# Patient Record
Sex: FEMALE | Race: WHITE | Employment: OTHER
[De-identification: names, ages, dates, MRNs, and addresses within clinical notes are randomized per-mention and may not be internally consistent; named-entity substitution may affect disease eponyms.]

---

## 2017-04-05 DIAGNOSIS — F41.9 ANXIETY: ICD-10-CM

## 2017-04-05 RX ORDER — BUSPIRONE HYDROCHLORIDE 15 MG/1
TABLET ORAL
Qty: 60 TABLET | Refills: 2 | Status: SHIPPED | OUTPATIENT
Start: 2017-04-05 | End: 2017-06-20

## 2017-04-05 NOTE — TELEPHONE ENCOUNTER
busPIRone (BUSPAR) 15 MG tablet       Last Written Prescription Date: 6/07/2016  Last Fill Quantity: 30; # refills: 11  Last Office Visit with [facility], [facility] or [facility] prescribing provider:  6/07/2016        Last PHQ-9 score on record=   PHQ-9 SCORE 9/8/2015   Total Score -   Total Score 4       Lab Results   Component Value Date    AST 10 02/10/2015     Lab Results   Component Value Date    ALT 23 02/10/2015

## 2017-06-10 ENCOUNTER — HEALTH MAINTENANCE LETTER (OUTPATIENT)
Age: 82
End: 2017-06-10

## 2017-06-16 DIAGNOSIS — I10 ESSENTIAL HYPERTENSION, BENIGN: Primary | ICD-10-CM

## 2017-06-16 DIAGNOSIS — F41.9 ANXIETY: ICD-10-CM

## 2017-06-16 DIAGNOSIS — G47.00 INSOMNIA, UNSPECIFIED: ICD-10-CM

## 2017-06-16 DIAGNOSIS — M85.80 OSTEOPENIA: ICD-10-CM

## 2017-06-16 NOTE — TELEPHONE ENCOUNTER
LORazepam (ATIVAN) 0.5 MG tablet      Last Written Prescription Date:  12/13/16  Last Fill Quantity: 30 tab,   # refills: 5  Last Office Visit with [facility], [facility] or [facility] prescribing provider: 06/07/16  Future Office visit:       Routing refill request to provider for review/approval because:  Drug not on the [facility], [facility] or [facility] refill protocol or controlled substance

## 2017-06-18 DIAGNOSIS — F41.9 ANXIETY: ICD-10-CM

## 2017-06-20 RX ORDER — ZOLPIDEM TARTRATE 6.25 MG/1
6.25 TABLET, FILM COATED, EXTENDED RELEASE ORAL
Qty: 30 TABLET | Refills: 0 | Status: SHIPPED | OUTPATIENT
Start: 2017-06-20 | End: 2017-07-14

## 2017-06-20 RX ORDER — IRBESARTAN AND HYDROCHLOROTHIAZIDE 150; 12.5 MG/1; MG/1
TABLET, FILM COATED ORAL
Qty: 30 TABLET | Refills: 0 | Status: SHIPPED | OUTPATIENT
Start: 2017-06-20 | End: 2017-07-14

## 2017-06-20 RX ORDER — BUSPIRONE HYDROCHLORIDE 15 MG/1
TABLET ORAL
Qty: 60 TABLET | Refills: 0 | Status: SHIPPED | OUTPATIENT
Start: 2017-06-20 | End: 2017-07-14

## 2017-06-20 RX ORDER — ALENDRONATE SODIUM 70 MG/1
70 TABLET ORAL
Qty: 4 TABLET | Refills: 0 | Status: SHIPPED | OUTPATIENT
Start: 2017-06-20 | End: 2017-07-14

## 2017-06-20 RX ORDER — LORAZEPAM 0.5 MG/1
TABLET ORAL
Qty: 30 TABLET | Refills: 0 | Status: SHIPPED | OUTPATIENT
Start: 2017-06-20 | End: 2017-07-14

## 2017-06-20 NOTE — TELEPHONE ENCOUNTER
Rx called into [facility].   The patient has been notified of this information and all questions answered.  Patient will call to schedule lab appointment unable to schedule due to hold on appointment slots because of construction.

## 2017-06-20 NOTE — TELEPHONE ENCOUNTER
Pt will be out tomorrow      irbesartan-hydrochlorothiazide (AVALIDE) 150-12.5 MG per tablet      Last Written Prescription Date: 06/07/2016  Last Fill Quantity: 30, # refills: 11  Last Office Visit with [facility], [facility] or [facility] prescribing provider: 06/07/2016       Potassium   Date Value Ref Range Status   06/07/2016 4.2 3.4 - 5.3 mmol/L Final     Creatinine   Date Value Ref Range Status   06/07/2016 0.58 0.52 - 1.04 mg/dL Final     BP Readings from Last 3 Encounters:   06/07/16 130/70   01/08/16 134/74   01/05/16 152/68

## 2017-06-20 NOTE — TELEPHONE ENCOUNTER
Please call in RF for Lorazepam, Ambien and Alendronate for 1 month to local pharmacy. Other meds faxed OK. Pt due for fasting labs and appt in clinic. Pt to have fastibng labs done as ordered in the next 3 weeks and then see me a few days after labs are done to review results and follow-up on medical issues. Will address longterm refills of meds at that time

## 2017-06-20 NOTE — TELEPHONE ENCOUNTER
Routing refill request to provider for review/approval because:  Patient needs to be seen because it has been more than 1 year since last office visit.  Due for PHQ-9 form

## 2017-06-20 NOTE — TELEPHONE ENCOUNTER
LOV was June 7, 2016.   Pt is due for annual exam.   RX for irbesartan-hydrochlorothiazide (AVALIDE) 150-12.5 MG per tablet. Medication is being filled for 1 time refill only due to:  Patient needs to be seen because it has been more than one year since last visit.     RX for LORazepam (ATIVAN) 0.5 MG tablet--Routing refill request to provider for review/approval because:  RX is a controlled med.

## 2017-06-20 NOTE — TELEPHONE ENCOUNTER
FLUoxetine (PROZAC) 20 MG capsule     Last Written Prescription Date: 6/07/2016  Last Fill Quantity: 30, # refills: 11  Last Office Visit with [facility] primary care provider:  6/07/2016        Last PHQ-9 score on record=   PHQ-9 SCORE 9/8/2015   Total Score -   Total Score 4

## 2017-06-22 DIAGNOSIS — I10 ESSENTIAL HYPERTENSION, BENIGN: ICD-10-CM

## 2017-06-22 LAB
ALBUMIN SERPL-MCNC: 3.8 G/DL (ref 3.4–5)
ALP SERPL-CCNC: 65 U/L (ref 40–150)
ALT SERPL W P-5'-P-CCNC: 20 U/L (ref 0–50)
ANION GAP SERPL CALCULATED.3IONS-SCNC: 7 MMOL/L (ref 3–14)
AST SERPL W P-5'-P-CCNC: 13 U/L (ref 0–45)
BILIRUB SERPL-MCNC: 0.6 MG/DL (ref 0.2–1.3)
BUN SERPL-MCNC: 15 MG/DL (ref 7–30)
CALCIUM SERPL-MCNC: 8.9 MG/DL (ref 8.5–10.1)
CHLORIDE SERPL-SCNC: 101 MMOL/L (ref 94–109)
CHOLEST SERPL-MCNC: 195 MG/DL
CO2 SERPL-SCNC: 29 MMOL/L (ref 20–32)
CREAT SERPL-MCNC: 0.64 MG/DL (ref 0.52–1.04)
GFR SERPL CREATININE-BSD FRML MDRD: 90 ML/MIN/1.7M2
GLUCOSE SERPL-MCNC: 94 MG/DL (ref 70–99)
HDLC SERPL-MCNC: 79 MG/DL
LDLC SERPL CALC-MCNC: 103 MG/DL
NONHDLC SERPL-MCNC: 116 MG/DL
POTASSIUM SERPL-SCNC: 4.2 MMOL/L (ref 3.4–5.3)
PROT SERPL-MCNC: 7.2 G/DL (ref 6.8–8.8)
SODIUM SERPL-SCNC: 137 MMOL/L (ref 133–144)
TRIGL SERPL-MCNC: 65 MG/DL

## 2017-06-22 PROCEDURE — 36415 COLL VENOUS BLD VENIPUNCTURE: CPT | Performed by: INTERNAL MEDICINE

## 2017-06-22 PROCEDURE — 80053 COMPREHEN METABOLIC PANEL: CPT | Performed by: INTERNAL MEDICINE

## 2017-06-22 PROCEDURE — 80061 LIPID PANEL: CPT | Performed by: INTERNAL MEDICINE

## 2017-07-14 ENCOUNTER — OFFICE VISIT (OUTPATIENT)
Dept: INTERNAL MEDICINE | Facility: CLINIC | Age: 82
End: 2017-07-14
Payer: COMMERCIAL

## 2017-07-14 VITALS
WEIGHT: 136 LBS | DIASTOLIC BLOOD PRESSURE: 72 MMHG | SYSTOLIC BLOOD PRESSURE: 128 MMHG | HEART RATE: 71 BPM | TEMPERATURE: 97.5 F | OXYGEN SATURATION: 98 % | HEIGHT: 57 IN | BODY MASS INDEX: 29.34 KG/M2

## 2017-07-14 DIAGNOSIS — Z00.00 MEDICARE ANNUAL WELLNESS VISIT, SUBSEQUENT: Primary | ICD-10-CM

## 2017-07-14 DIAGNOSIS — M85.80 OSTEOPENIA, UNSPECIFIED LOCATION: ICD-10-CM

## 2017-07-14 DIAGNOSIS — F41.9 ANXIETY: ICD-10-CM

## 2017-07-14 DIAGNOSIS — R53.83 OTHER FATIGUE: ICD-10-CM

## 2017-07-14 DIAGNOSIS — I10 ESSENTIAL HYPERTENSION, BENIGN: ICD-10-CM

## 2017-07-14 DIAGNOSIS — G47.00 INSOMNIA, UNSPECIFIED: ICD-10-CM

## 2017-07-14 DIAGNOSIS — M21.619 BUNION OF GREAT TOE: ICD-10-CM

## 2017-07-14 LAB
CORTIS SERPL-MCNC: 10.2 UG/DL (ref 4–22)
ERYTHROCYTE [DISTWIDTH] IN BLOOD BY AUTOMATED COUNT: 12.8 % (ref 10–15)
HCT VFR BLD AUTO: 41 % (ref 35–47)
HGB BLD-MCNC: 13.8 G/DL (ref 11.7–15.7)
MCH RBC QN AUTO: 30.1 PG (ref 26.5–33)
MCHC RBC AUTO-ENTMCNC: 33.7 G/DL (ref 31.5–36.5)
MCV RBC AUTO: 90 FL (ref 78–100)
PLATELET # BLD AUTO: 297 10E9/L (ref 150–450)
RBC # BLD AUTO: 4.58 10E12/L (ref 3.8–5.2)
TSH SERPL DL<=0.005 MIU/L-ACNC: 2.2 MU/L (ref 0.4–4)
WBC # BLD AUTO: 6 10E9/L (ref 4–11)

## 2017-07-14 PROCEDURE — 99397 PER PM REEVAL EST PAT 65+ YR: CPT | Performed by: INTERNAL MEDICINE

## 2017-07-14 PROCEDURE — 36415 COLL VENOUS BLD VENIPUNCTURE: CPT | Performed by: INTERNAL MEDICINE

## 2017-07-14 PROCEDURE — 85027 COMPLETE CBC AUTOMATED: CPT | Performed by: INTERNAL MEDICINE

## 2017-07-14 PROCEDURE — 84443 ASSAY THYROID STIM HORMONE: CPT | Performed by: INTERNAL MEDICINE

## 2017-07-14 PROCEDURE — 82533 TOTAL CORTISOL: CPT | Performed by: INTERNAL MEDICINE

## 2017-07-14 RX ORDER — IRBESARTAN AND HYDROCHLOROTHIAZIDE 150; 12.5 MG/1; MG/1
1 TABLET, FILM COATED ORAL DAILY
Qty: 90 TABLET | Refills: 3 | Status: SHIPPED | OUTPATIENT
Start: 2017-07-14 | End: 2018-03-08

## 2017-07-14 RX ORDER — LORAZEPAM 0.5 MG/1
TABLET ORAL
Qty: 30 TABLET | Refills: 5 | Status: SHIPPED | OUTPATIENT
Start: 2017-07-14 | End: 2018-01-10

## 2017-07-14 RX ORDER — ZOLPIDEM TARTRATE 6.25 MG/1
6.25 TABLET, FILM COATED, EXTENDED RELEASE ORAL
Qty: 30 TABLET | Refills: 3 | Status: SHIPPED | OUTPATIENT
Start: 2017-07-14 | End: 2018-08-01

## 2017-07-14 RX ORDER — ALENDRONATE SODIUM 70 MG/1
70 TABLET ORAL
Qty: 12 TABLET | Refills: 3 | Status: SHIPPED | OUTPATIENT
Start: 2017-07-14 | End: 2018-03-08

## 2017-07-14 RX ORDER — LORAZEPAM 0.5 MG/1
TABLET ORAL
Qty: 30 TABLET | Refills: 1 | Status: SHIPPED | OUTPATIENT
Start: 2017-07-14 | End: 2017-07-14

## 2017-07-14 RX ORDER — BUSPIRONE HYDROCHLORIDE 15 MG/1
TABLET ORAL
Qty: 90 TABLET | Refills: 3 | Status: SHIPPED | OUTPATIENT
Start: 2017-07-14 | End: 2018-03-08

## 2017-07-14 ASSESSMENT — ANXIETY QUESTIONNAIRES
GAD7 TOTAL SCORE: 6
2. NOT BEING ABLE TO STOP OR CONTROL WORRYING: SEVERAL DAYS
5. BEING SO RESTLESS THAT IT IS HARD TO SIT STILL: NOT AT ALL
3. WORRYING TOO MUCH ABOUT DIFFERENT THINGS: MORE THAN HALF THE DAYS
1. FEELING NERVOUS, ANXIOUS, OR ON EDGE: SEVERAL DAYS
7. FEELING AFRAID AS IF SOMETHING AWFUL MIGHT HAPPEN: NOT AT ALL
IF YOU CHECKED OFF ANY PROBLEMS ON THIS QUESTIONNAIRE, HOW DIFFICULT HAVE THESE PROBLEMS MADE IT FOR YOU TO DO YOUR WORK, TAKE CARE OF THINGS AT HOME, OR GET ALONG WITH OTHER PEOPLE: SOMEWHAT DIFFICULT
6. BECOMING EASILY ANNOYED OR IRRITABLE: SEVERAL DAYS

## 2017-07-14 ASSESSMENT — PATIENT HEALTH QUESTIONNAIRE - PHQ9: 5. POOR APPETITE OR OVEREATING: SEVERAL DAYS

## 2017-07-14 NOTE — NURSING NOTE
"Chief Complaint   Patient presents with     Physical       Initial /72  Pulse 71  Temp 97.5  F (36.4  C) (Oral)  Ht 4' 9\" (1.448 m)  Wt 136 lb (61.7 kg)  SpO2 98%  BMI 29.43 kg/m2 Estimated body mass index is 29.43 kg/(m^2) as calculated from the following:    Height as of this encounter: 4' 9\" (1.448 m).    Weight as of this encounter: 136 lb (61.7 kg).  Medication Reconciliation: complete  "

## 2017-07-14 NOTE — PROGRESS NOTES
SUBJECTIVE:   Kathy Braswell is a 82 year old female who presents for Preventive Visit.      Are you in the first 12 months of your Medicare Part B coverage?  No    Healthy Habits:    Do you get at least three servings of calcium containing foods daily (dairy, green leafy vegetables, etc.)? yes    Amount of exercise or daily activities, outside of work: 2-3 day(s) per week with CURVES    Problems taking medications regularly No    Medication side effects: No    Have you had an eye exam in the past two years? yes    Do you see a dentist twice per year? yes    Do you have sleep apnea, excessive snoring or daytime drowsiness?no    COGNITIVE SCREEN  1) Repeat 3 items (Banana, Sunrise, Chair)    2) Clock draw: NORMAL  -   3) 3 item recall: Recalls 2 objects   Results: NORMAL clock, 1-2 items recalled: COGNITIVE IMPAIRMENT LESS LIKELY    Mini-CogTM Copyright S Dena. Licensed by the author for use in [facility]; reprinted with permission (tamanna@[email or web address]). All rights reserved.            Reviewed and updated as needed this visit by clinical staff  Tobacco  Allergies         Reviewed and updated as needed this visit by Provider        Social History   Substance Use Topics     Smoking status: Never Smoker     Smokeless tobacco: Never Used     Alcohol use 0.0 oz/week     0 Standard drinks or equivalent per week      Comment: seldom       The patient does not drink >3 drinks per day nor >7 drinks per week.    Today's PHQ-2 Score:   PHQ-2 ( 1999 [employer]) 7/14/2017 6/7/2016   Q1: Little interest or pleasure in doing things 0 0   Q2: Feeling down, depressed or hopeless 1 0   PHQ-2 Score 1 0       Do you feel safe in your environment - Yes    Do you have a Health Care Directive?: No: Advance care planning was reviewed with patient; patient given information to fill out and return to clinic.    Current providers sharing in care for this patient include:   Patient Care Team:  Rio Ospina MD as PCP -  General      Hearing impairment: No    Ability to successfully perform activities of daily living: Yes, no assistance needed     Fall risk:  Fallen 2 or more times in the past year?: No  Any fall with injury in the past year?: No    Home safety:  none identified      The following health maintenance items are reviewed in Epic and correct as of today:  Health Maintenance   Topic Date Due     PHQ-9 Q6 MONTHS  03/08/2016     ADVANCE DIRECTIVE PLANNING Q5 YRS  06/13/2016     COLONOSCOPY Q5 YR  07/28/2016     FALL RISK ASSESSMENT  06/07/2017     DEPRESSION ACTION PLAN Q1 YR  06/07/2017     INFLUENZA VACCINE (SYSTEM ASSIGNED)  09/01/2017     TETANUS IMMUNIZATION (SYSTEM ASSIGNED)  10/05/2026     DEXA SCAN SCREENING (SYSTEM ASSIGNED)  Completed     PNEUMOCOCCAL  Completed     Labs reviewed in EPIC        ROS:  C: NEGATIVE for fever, chills, change in weight compared to Jan 2016  I: NEGATIVE for worrisome rashes, moles or lesions  E: NEGATIVE for vision changes or irritation. Eye exam July 2016  E/M: NEGATIVE for ear, mouth and throat problems. Occ clear rhinorrhea. Better with Claritin prn  R: NEGATIVE for significant cough or SOB  B: NEGATIVE for masses, tenderness or discharge  CV: NEGATIVE for chest pain, palpitations or peripheral edema  GI: NEGATIVE for nausea, abdominal pain  or change in bowel habits. Very rare GERD. Not bothersome enough to use meds. Drinks a lot of coffee  : NEGATIVE for frequency, dysuria, or hematuria. Has occ nocturia with caffeine use. Wearing a pad at night  M: NEGATIVE for significant arthralgias or myalgia that stop activity. Mild stiffness with mild osteoarthritis in AM. Still being able to do CURVES 2-3x/week  N: NEGATIVE for weakness, dizziness or paresthesias  E: NEGATIVE for temperature intolerance, skin/hair changes. Hx osteoporosis. Had failed drug holiday and restarted Fosamax in 2015  H: NEGATIVE for bleeding problems  P: POSITIVE for depression and anxiety. See PHQ and JUANPABLO in  "chart. Using Lorazepam daily in addition to Buspar.  Had fatigue issue with Buspar BID so taking once a day now along with Fluoxetine 20mg daily. Didn't tolerate higher doses of Fluoxetine.  Using Ambien about 2-3x/week    OBJECTIVE:   /72  Pulse 71  Temp 97.5  F (36.4  C) (Oral)  Ht 4' 9\" (1.448 m)  Wt 136 lb (61.7 kg)  SpO2 98%  BMI 29.43 kg/m2 Estimated body mass index is 29.43 kg/(m^2) as calculated from the following:    Height as of this encounter: 4' 9\" (1.448 m).    Weight as of this encounter: 136 lb (61.7 kg).  EXAM:   General appearance -   alert, no distress. Slight chronic anxious affect  Skin - No rashes or lesions.  Head - normocephalic, atraumatic  Eyes - VALENTE, EOMI, fundi exam with nondilated pupils negative.  Ears - External ears normal. Canals clear. TM's normal.  Nose/Sinuses - Nares normal. Septum midline. Mucosa normal. No drainage or sinus tenderness.  Oropharynx - No erythema, no adenopathy, no exudates.  Neck - Supple without adenopathy or thyromegaly. No bruits.  Lungs - Clear to auscultation without wheezes/rhonchi.  Heart - Regular rate and rhythm without murmurs, clicks, or gallops.  Nodes - No supraclavicular, axillary, or inguinal adenopathy palpable.  Breasts - deferred  Abdomen - Abdomen soft, non-tender. BS normal. No masses or hepatosplenomegaly palpable. No bruits.  Extremities -No cyanosis, clubbing or edema.   Mild nontender varicose veins BLEs  Musculoskeletal - Spine ROM normal. Muscular strength intact.  Bilateral 1st MTP bunions.  Arches of feet low due to this. No skin callus. Stable gait when wearing shoes.  Minimally imbalanced due to foot formation when barefoot  Peripheral pulses - radial=4/4, femoral=4/4, posterior tibial=4/4, dorsalis pedis=4/4,  Neuro - Gait normal. Reflexes normal and symmetric. Sensation grossly WNL.  Genital/Rectal - deferred      ASSESSMENT / PLAN:   1. Medicare annual wellness visit, subsequent  HCM UTD. Due for mammogram in " "August    2. Anxiety   Controlled overall. JUANPABLO score still a little elevated but has not tolerated higher doses of Buspar or Fluoxetine and best not to be increasing Lorazepam use given age and  Mild fatigue sx already  - busPIRone (BUSPAR) 15 MG tablet; 1 tab daily  Dispense: 90 tablet; Refill: 3  - FLUoxetine (PROZAC) 20 MG capsule; Take 1 capsule (20 mg) by mouth daily  Dispense: 90 capsule; Refill: 3  - LORazepam (ATIVAN) 0.5 MG tablet; 1 tab daily as needed for anxiety  Dispense: 30 tablet; Refill: 5    3. Essential hypertension, benign  controlled  - irbesartan-hydrochlorothiazide (AVALIDE) 150-12.5 MG per tablet; Take 1 tablet by mouth daily  Dispense: 90 tablet; Refill: 3    4. Osteopenia, unspecified location  Needs longterm treatment. HAs failed previous drug holiday attempt  - alendronate (FOSAMAX) 70 MG tablet; Take 1 tablet (70 mg) by mouth every 7 days Take with over 8 ounces water and stay upright for at least 30 minutes after dose.  Take at least 60 minutes before breakfast  Dispense: 12 tablet; Refill: 3    5. Insomnia, unspecified  stable  - zolpidem (AMBIEN CR) 6.25 MG CR tablet; Take 1 tablet (6.25 mg) by mouth nightly as needed for sleep  Dispense: 30 tablet; Refill: 3    6. Other fatigue  - CBC with platelets  - TSH with free T4 reflex  - Cortisol    7. Bunion of great toe  Sx bilateral. Pt declines surgery. Maintain god arch support and wide shoes to assist with stable balance. No acute skin issues      End of Life Planning:  Patient currently has an advanced directive: No.  I have verified the patient's ablity to prepare an advanced directive/make health care decisions.  Literature was provided to assist patient in preparing an advanced directive.    COUNSELING:  Reviewed preventive health counseling, as reflected in patient instructions        Estimated body mass index is 29.43 kg/(m^2) as calculated from the following:    Height as of this encounter: 4' 9\" (1.448 m).    Weight as of this " encounter: 136 lb (61.7 kg).     reports that she has never smoked. She has never used smokeless tobacco.      Appropriate preventive services were discussed with this patient, including applicable screening as appropriate for cardiovascular disease, diabetes, osteopenia/osteoporosis, and glaucoma.  As appropriate for age/gender, discussed screening for colorectal cancer, prostate cancer, breast cancer, and cervical cancer. Checklist reviewing preventive services available has been given to the patient.    Reviewed patients plan of care and provided an AVS. The Basic Care Plan (routine screening as documented in Health Maintenance) for Kathy meets the Care Plan requirement. This Care Plan has been established and reviewed with the Patient.    Counseling Resources:  ATP IV Guidelines  Pooled Cohorts Equation Calculator  Breast Cancer Risk Calculator  FRAX Risk Assessment  ICSI Preventive Guidelines  Dietary Guidelines for Americans, 2010  USDA's MyPlate  ASA Prophylaxis  Lung CA Screening    PLAN:   Mammogram after 8/18/17  Continue current medications  Try to use shoes with better arch support. ? Birkenstocks, tennis shoes. Consider Emile shoes  Labs for fatigue    Rio Ospina MD  [facility]

## 2017-07-14 NOTE — LETTER
[facility]  [address] [address]  [phone]      7/15/2017       Kathy Braswell  [address]   [facility] [address]        Dear Kathy,  Enclosed are a printed copy of your most recent  lab results.   Unless commented on below, mild variation of results  outside the normal range are not clinically signicant.    Hemoglobin, Platelets,  Cortisol (adrenaline), Thyroid and White Blood Cells lab results were normal.  No evidence of a metabolic cause for your fatigue symptoms  Continue current medication.  If the fatigue issues worsen, then contact the clinic nurse triage line [phone] to let me know and I will refer you onto the [facility] to assess your sleep quality further to see if this is contributing to your symptoms    If you have further questions/concerns regarding the results, I would ask that you bring them to your next follow-up appointment with me and I would be happy to review them with you further.        Sincerely,      Rio Ospina MD  Internal Medicine

## 2017-07-14 NOTE — MR AVS SNAPSHOT
"              After Visit Summary   7/14/2017    Kathy Braswell    MRN: [id number]           Patient Information     Date Of Birth          1935        Visit Information        Provider Department      7/14/2017 9:00 AM Rio Ospina MD [facility]        Today's Diagnoses     Medicare annual wellness visit, subsequent    -  1    Anxiety        Essential hypertension, benign        Osteopenia, unspecified location        Insomnia, unspecified        Other fatigue          Care Instructions     Mammogram after 8/18/17  Continue current medications  Try to use shoes with better arch support. ? Birkenstocks, tennis shoes. Consider [facility] shoes  Labs for fatigue          Follow-ups after your visit        Who to contact     If you have questions or need follow up information about today's clinic visit or your schedule please contact [facility] directly at [phone].  Normal or non-critical lab and imaging results will be communicated to you by [employer], letter or phone within 4 business days after the clinic has received the results. If you do not hear from us within 7 days, please contact the clinic through MyChart or phone. If you have a critical or abnormal lab result, we will notify you by phone as soon as possible.  Submit refill requests through [employer] or call your pharmacy and they will forward the refill request to us. Please allow 3 business days for your refill to be completed.          Additional Information About Your Visit        MyChart Information     [employer] lets you send messages to your doctor, view your test results, renew your prescriptions, schedule appointments and more. To sign up, go to [email or web address] . Click on \"Log in\" on the left side of the screen, which will take you to the Welcome page. Then click on \"Sign up Now\" on the right side of the page.     You will be asked to enter the access code listed below, as well as some " "personal information. Please follow the directions to create your username and password.     Your access code is: [email or web address]  Expires: 10/12/2017  9:42 AM     Your access code will  in 90 days. If you need help or a new code, please call your [facility] clinic or [phone].        Care EveryWhere ID     This is your Care EveryWhere ID. This could be used by other organizations to access your [facility] medical records  [id number]        Your Vitals Were     Pulse Temperature Height Pulse Oximetry BMI (Body Mass Index)       71 97.5  F (36.4  C) (Oral) 4' 9\" (1.448 m) 98% 29.43 kg/m2        Blood Pressure from Last 3 Encounters:   17 128/72   16 130/70   16 134/74    Weight from Last 3 Encounters:   17 136 lb (61.7 kg)   16 129 lb (58.5 kg)   16 135 lb (61.2 kg)              We Performed the Following     CBC with platelets     Cortisol     TSH with free T4 reflex          Today's Medication Changes          These changes are accurate as of: 17  9:42 AM.  If you have any questions, ask your nurse or doctor.               Start taking these medicines.        Dose/Directions    LORazepam 0.5 MG tablet   Commonly known as:  ATIVAN   Used for:  Anxiety   Started by:  Rio Ospina MD        1 tab daily as needed for anxiety   Quantity:  30 tablet   Refills:  5         These medicines have changed or have updated prescriptions.        Dose/Directions    busPIRone 15 MG tablet   Commonly known as:  BUSPAR   This may have changed:  additional instructions   Used for:  Anxiety   Changed by:  Rio Ospina MD        1 tab daily   Quantity:  90 tablet   Refills:  3       irbesartan-hydrochlorothiazide 150-12.5 MG per tablet   Commonly known as:  AVALIDE   This may have changed:  See the new instructions.   Used for:  Essential hypertension, benign   Changed by:  Rio Ospina MD        Dose:  1 tablet   Take 1 tablet by mouth daily   Quantity:  90 tablet   Refills:  3          "   Where to get your medicines      These medications were sent to [facility] [id number] [facility] [address] [place] [address] & [address]  [address] [address]     Phone:  [phone]     busPIRone 15 MG tablet    FLUoxetine 20 MG capsule    irbesartan-hydrochlorothiazide 150-12.5 MG per tablet         Some of these will need a paper prescription and others can be bought over the counter.  Ask your nurse if you have questions.     Bring a paper prescription for each of these medications     alendronate 70 MG tablet    LORazepam 0.5 MG tablet    zolpidem 6.25 MG CR tablet                Primary Care Provider Office Phone # Fax #    Rio Ospina [phone]       [facility] [address]  [facility] [address]        Equal Access to Services     AMITA GARCIA : Hadii karena mckay hadasho Soomaali, waaxda luqadaha, qaybta kaalmada ademarivelyada, jennifer duque . So [facility] [phone].    ATENCIÓN: Si habla español, tiene a viveros disposición servicios gratuitos de asistencia lingüística. Llame al [phone].    We comply with applicable federal civil rights laws and [place] laws. We do not discriminate on the basis of race, color, national origin, age, disability sex, sexual orientation or gender identity.            Thank you!     Thank you for choosing [facility]  for your care. Our goal is always to provide you with excellent care. Hearing back from our patients is one way we can continue to improve our services. Please take a few minutes to complete the written survey that you may receive in the mail after your visit with us. Thank you!             Your Updated Medication List - Protect others around you: Learn how to safely use, store and throw away your medicines at www.disposemymeds.org.          This list is accurate as of: 7/14/17  9:42 AM.  Always use your most recent med list.                   Brand Name  Dispense Instructions for use Diagnosis    alendronate 70 MG tablet    FOSAMAX    12 tablet    Take 1 tablet (70 mg) by mouth every 7 days Take with over 8 ounces water and stay upright for at least 30 minutes after dose.  Take at least 60 minutes before breakfast    Osteopenia, unspecified location       busPIRone 15 MG tablet    BUSPAR    90 tablet    1 tab daily    Anxiety       cholecalciferol 1000 UNIT tablet    vitamin D     Take 1 tablet by mouth daily.        FLUoxetine 20 MG capsule    PROzac    90 capsule    Take 1 capsule (20 mg) by mouth daily    Anxiety       fluticasone 50 MCG/ACT spray    FLONASE    1 Package    Spray 1-2 sprays into both nostrils daily    Nasal congestion       irbesartan-hydrochlorothiazide 150-12.5 MG per tablet    AVALIDE    90 tablet    Take 1 tablet by mouth daily    Essential hypertension, benign       LORazepam 0.5 MG tablet    ATIVAN    30 tablet    1 tab daily as needed for anxiety    Anxiety       order for DME     1 each    BP cuff, brand as covered by insurance.  Dx: HTN    Essential hypertension, benign       TYLENOL 325 MG tablet   Generic drug:  acetaminophen      Take 1-2 tablets (325-650 mg) by mouth every 6 hours as needed for mild pain        zolpidem 6.25 MG CR tablet    AMBIEN CR    30 tablet    Take 1 tablet (6.25 mg) by mouth nightly as needed for sleep    Insomnia, unspecified

## 2017-07-14 NOTE — PATIENT INSTRUCTIONS
Mammogram after 8/18/17  Continue current medications  Try to use shoes with better arch support. ? Birkenstocks, tennis shoes. Consider [facility] shoes  Labs for fatigue

## 2017-07-15 ASSESSMENT — PATIENT HEALTH QUESTIONNAIRE - PHQ9: SUM OF ALL RESPONSES TO PHQ QUESTIONS 1-9: 3

## 2017-07-15 ASSESSMENT — ANXIETY QUESTIONNAIRES: GAD7 TOTAL SCORE: 6

## 2017-08-22 ENCOUNTER — RADIANT APPOINTMENT (OUTPATIENT)
Dept: MAMMOGRAPHY | Facility: CLINIC | Age: 82
End: 2017-08-22
Attending: INTERNAL MEDICINE
Payer: COMMERCIAL

## 2017-08-22 DIAGNOSIS — Z12.31 VISIT FOR SCREENING MAMMOGRAM: ICD-10-CM

## 2017-08-22 PROCEDURE — G0202 SCR MAMMO BI INCL CAD: HCPCS | Mod: TC

## 2017-10-11 ENCOUNTER — ALLIED HEALTH/NURSE VISIT (OUTPATIENT)
Dept: NURSING | Facility: CLINIC | Age: 82
End: 2017-10-11
Payer: COMMERCIAL

## 2017-10-11 DIAGNOSIS — Z23 NEED FOR PROPHYLACTIC VACCINATION AND INOCULATION AGAINST INFLUENZA: Primary | ICD-10-CM

## 2017-10-11 PROCEDURE — G0008 ADMIN INFLUENZA VIRUS VAC: HCPCS

## 2017-10-11 PROCEDURE — 90662 IIV NO PRSV INCREASED AG IM: CPT

## 2017-10-11 NOTE — MR AVS SNAPSHOT
"              After Visit Summary   10/11/2017    Kathy Braswell    MRN: [id number]           Patient Information     Date Of Birth          1935        Visit Information        Provider Department      10/11/2017 9:00 AM [facility] - NURSE [facility]        Today's Diagnoses     Need for prophylactic vaccination and inoculation against influenza    -  1       Follow-ups after your visit        Who to contact     If you have questions or need follow up information about today's clinic visit or your schedule please contact [facility] directly at [phone].  Normal or non-critical lab and imaging results will be communicated to you by Regenhart, letter or phone within 4 business days after the clinic has received the results. If you do not hear from us within 7 days, please contact the clinic through Regenhart or phone. If you have a critical or abnormal lab result, we will notify you by phone as soon as possible.  Submit refill requests through Shicon or call your pharmacy and they will forward the refill request to us. Please allow 3 business days for your refill to be completed.          Additional Information About Your Visit        MyChart Information     Shicon lets you send messages to your doctor, view your test results, renew your prescriptions, schedule appointments and more. To sign up, go to [email or web address] . Click on \"Log in\" on the left side of the screen, which will take you to the Welcome page. Then click on \"Sign up Now\" on the right side of the page.     You will be asked to enter the access code listed below, as well as some personal information. Please follow the directions to create your username and password.     Your access code is: [email or web address]  Expires: 10/12/2017  9:42 AM     Your access code will  in 90 days. If you need help or a new code, please call your [facility] or [phone].        Care EveryWhere ID     This " is your Care EveryWhere ID. This could be used by other organizations to access your [place] medical records  [id number]         Blood Pressure from Last 3 Encounters:   07/14/17 128/72   06/07/16 130/70   01/08/16 134/74    Weight from Last 3 Encounters:   07/14/17 136 lb (61.7 kg)   06/07/16 129 lb (58.5 kg)   01/08/16 135 lb (61.2 kg)              We Performed the Following     ADMIN INFLUENZA (For MEDICARE Patients ONLY) []     FLU VACCINE, INCREASED ANTIGEN, PRESV FREE, AGE 65+ [85837]        Primary Care Provider Office Phone # Fax #    Rio Ospina [phone]       [address] [place] [address]        Equal Access to Services     AMITA GARCIA : Hadii karena mckay hadasho Soomaali, waaxda luqadaha, qaybta kaalmada adeegyada, jennifer duque . So [facility] [phone].    ATENCIÓN: Si habla español, tiene a viveros disposición servicios gratuitos de asistencia lingüística. Llame al [phone].    We comply with applicable federal civil rights laws and [place] laws. We do not discriminate on the basis of race, color, national origin, age, disability, sex, sexual orientation, or gender identity.            Thank you!     Thank you for choosing [facility]  for your care. Our goal is always to provide you with excellent care. Hearing back from our patients is one way we can continue to improve our services. Please take a few minutes to complete the written survey that you may receive in the mail after your visit with us. Thank you!             Your Updated Medication List - Protect others around you: Learn how to safely use, store and throw away your medicines at www.disposemymeds.org.          This list is accurate as of: 10/11/17  9:39 AM.  Always use your most recent med list.                   Brand Name Dispense Instructions for use Diagnosis    alendronate 70 MG tablet    FOSAMAX    12 tablet    Take 1 tablet (70 mg) by mouth every 7 days  Take with over 8 ounces water and stay upright for at least 30 minutes after dose.  Take at least 60 minutes before breakfast    Osteopenia, unspecified location       busPIRone 15 MG tablet    BUSPAR    90 tablet    1 tab daily    Anxiety       cholecalciferol 1000 UNIT tablet    vitamin D     Take 1 tablet by mouth daily.        FLUoxetine 20 MG capsule    PROzac    90 capsule    Take 1 capsule (20 mg) by mouth daily    Anxiety       fluticasone 50 MCG/ACT spray    FLONASE    1 Package    Spray 1-2 sprays into both nostrils daily    Nasal congestion       irbesartan-hydrochlorothiazide 150-12.5 MG per tablet    AVALIDE    90 tablet    Take 1 tablet by mouth daily    Essential hypertension, benign       LORazepam 0.5 MG tablet    ATIVAN    30 tablet    1 tab daily as needed for anxiety    Anxiety       order for DME     1 each    BP cuff, brand as covered by insurance.  Dx: HTN    Essential hypertension, benign       TYLENOL 325 MG tablet   Generic drug:  acetaminophen      Take 1-2 tablets (325-650 mg) by mouth every 6 hours as needed for mild pain        zolpidem 6.25 MG CR tablet    AMBIEN CR    30 tablet    Take 1 tablet (6.25 mg) by mouth nightly as needed for sleep    Insomnia, unspecified

## 2017-10-11 NOTE — PROGRESS NOTES
Injectable Influenza Immunization Documentation    1.  Is the person to be vaccinated sick today?   No    2. Does the person to be vaccinated have an allergy to a component   of the vaccine?   No    3. Has the person to be vaccinated ever had a serious reaction   to influenza vaccine in the past?   No    4. Has the person to be vaccinated ever had Guillain-Barré syndrome?   No    Form completed by Lan WELSH

## 2017-12-01 ENCOUNTER — TRANSFERRED RECORDS (OUTPATIENT)
Dept: HEALTH INFORMATION MANAGEMENT | Facility: CLINIC | Age: 82
End: 2017-12-01

## 2017-12-11 ENCOUNTER — TRANSFERRED RECORDS (OUTPATIENT)
Dept: HEALTH INFORMATION MANAGEMENT | Facility: CLINIC | Age: 82
End: 2017-12-11

## 2017-12-29 ENCOUNTER — TRANSFERRED RECORDS (OUTPATIENT)
Dept: HEALTH INFORMATION MANAGEMENT | Facility: CLINIC | Age: 82
End: 2017-12-29

## 2018-01-10 DIAGNOSIS — F41.9 ANXIETY: ICD-10-CM

## 2018-01-11 NOTE — TELEPHONE ENCOUNTER
Ativan       Last Written Prescription Date:  7/14/17  Last Fill Quantity: 30,   # refills: 5  Last Office Visit: 7/14/17  Future Office visit:       Routing refill request to provider for review/approval because:  Drug not on the [facility], [facility] or [facility] refill protocol or controlled substance

## 2018-01-12 RX ORDER — LORAZEPAM 0.5 MG/1
TABLET ORAL
Qty: 30 TABLET | Refills: 0
Start: 2018-01-12 | End: 2018-02-28

## 2018-01-13 NOTE — TELEPHONE ENCOUNTER
Rx called to pharmacy by MD for 30 tabs or Lorazepam. Pt last seen 6 mos ago. With controlled substance, will need to be seen in the next 1 month for follow-up. Inform pt

## 2018-01-22 ENCOUNTER — TRANSFERRED RECORDS (OUTPATIENT)
Dept: HEALTH INFORMATION MANAGEMENT | Facility: CLINIC | Age: 83
End: 2018-01-22

## 2018-02-23 ENCOUNTER — TRANSFERRED RECORDS (OUTPATIENT)
Dept: HEALTH INFORMATION MANAGEMENT | Facility: CLINIC | Age: 83
End: 2018-02-23

## 2018-02-27 DIAGNOSIS — F41.9 ANXIETY: ICD-10-CM

## 2018-02-28 DIAGNOSIS — F41.9 ANXIETY: ICD-10-CM

## 2018-02-28 RX ORDER — LORAZEPAM 0.5 MG/1
TABLET ORAL
Qty: 30 TABLET | Refills: 1 | Status: SHIPPED | OUTPATIENT
Start: 2018-02-28 | End: 2018-05-09

## 2018-02-28 RX ORDER — LORAZEPAM 0.5 MG/1
TABLET ORAL
Qty: 30 TABLET | Refills: 0 | OUTPATIENT
Start: 2018-02-28

## 2018-02-28 NOTE — TELEPHONE ENCOUNTER
LORazepam (ATIVAN) 0.5 MG tablet      Last Written Prescription Date:  1/12/18  Last Fill Quantity: 30,   # refills: 0  Last Office Visit: 7/14/17  Future Office visit:    Next 5 appointments (look out 90 days)     Mar 08, 2018  2:30 PM CST   [facility] with [facility]    [address] [address] [phone]                   Routing refill request to provider for review/approval because:  Drug not on the [facility], [facility] or [facility] refill protocol or controlled substance

## 2018-02-28 NOTE — TELEPHONE ENCOUNTER
Timing of RF request OK. Will be seeing pt in July for annual f/u. Rx approved and in [place] basket. Please fax to pt's pharmacy

## 2018-03-08 ENCOUNTER — OFFICE VISIT (OUTPATIENT)
Dept: INTERNAL MEDICINE | Facility: CLINIC | Age: 83
End: 2018-03-08
Payer: COMMERCIAL

## 2018-03-08 VITALS
BODY MASS INDEX: 28.69 KG/M2 | WEIGHT: 133 LBS | HEART RATE: 82 BPM | TEMPERATURE: 97.8 F | HEIGHT: 57 IN | RESPIRATION RATE: 16 BRPM | SYSTOLIC BLOOD PRESSURE: 132 MMHG | DIASTOLIC BLOOD PRESSURE: 68 MMHG

## 2018-03-08 DIAGNOSIS — M85.80 OSTEOPENIA, UNSPECIFIED LOCATION: ICD-10-CM

## 2018-03-08 DIAGNOSIS — I10 ESSENTIAL HYPERTENSION, BENIGN: ICD-10-CM

## 2018-03-08 DIAGNOSIS — F32.0 MILD MAJOR DEPRESSION (H): ICD-10-CM

## 2018-03-08 DIAGNOSIS — F41.9 ANXIETY: ICD-10-CM

## 2018-03-08 LAB
ANION GAP SERPL CALCULATED.3IONS-SCNC: 5 MMOL/L (ref 3–14)
BUN SERPL-MCNC: 19 MG/DL (ref 7–30)
CALCIUM SERPL-MCNC: 9.4 MG/DL (ref 8.5–10.1)
CHLORIDE SERPL-SCNC: 99 MMOL/L (ref 94–109)
CO2 SERPL-SCNC: 30 MMOL/L (ref 20–32)
CREAT SERPL-MCNC: 0.6 MG/DL (ref 0.52–1.04)
GFR SERPL CREATININE-BSD FRML MDRD: >90 ML/MIN/1.7M2
GLUCOSE SERPL-MCNC: 87 MG/DL (ref 70–99)
POTASSIUM SERPL-SCNC: 3.8 MMOL/L (ref 3.4–5.3)
SODIUM SERPL-SCNC: 134 MMOL/L (ref 133–144)

## 2018-03-08 PROCEDURE — 36415 COLL VENOUS BLD VENIPUNCTURE: CPT | Performed by: INTERNAL MEDICINE

## 2018-03-08 PROCEDURE — 99214 OFFICE O/P EST MOD 30 MIN: CPT | Performed by: INTERNAL MEDICINE

## 2018-03-08 PROCEDURE — 80048 BASIC METABOLIC PNL TOTAL CA: CPT | Performed by: INTERNAL MEDICINE

## 2018-03-08 RX ORDER — LORAZEPAM 0.5 MG/1
TABLET ORAL
Qty: 30 TABLET | Refills: 1 | Status: CANCELLED | OUTPATIENT
Start: 2018-03-08

## 2018-03-08 RX ORDER — ALENDRONATE SODIUM 70 MG/1
70 TABLET ORAL
Qty: 12 TABLET | Refills: 3 | Status: SHIPPED | OUTPATIENT
Start: 2018-03-08 | End: 2019-07-08

## 2018-03-08 RX ORDER — BUSPIRONE HYDROCHLORIDE 15 MG/1
TABLET ORAL
Qty: 90 TABLET | Refills: 3 | Status: SHIPPED | OUTPATIENT
Start: 2018-03-08 | End: 2019-04-29

## 2018-03-08 RX ORDER — IRBESARTAN AND HYDROCHLOROTHIAZIDE 150; 12.5 MG/1; MG/1
1 TABLET, FILM COATED ORAL DAILY
Qty: 90 TABLET | Refills: 3 | Status: SHIPPED | OUTPATIENT
Start: 2018-03-08 | End: 2019-04-03

## 2018-03-08 ASSESSMENT — ANXIETY QUESTIONNAIRES
GAD7 TOTAL SCORE: 3
6. BECOMING EASILY ANNOYED OR IRRITABLE: NOT AT ALL
2. NOT BEING ABLE TO STOP OR CONTROL WORRYING: SEVERAL DAYS
5. BEING SO RESTLESS THAT IT IS HARD TO SIT STILL: NOT AT ALL
1. FEELING NERVOUS, ANXIOUS, OR ON EDGE: SEVERAL DAYS
3. WORRYING TOO MUCH ABOUT DIFFERENT THINGS: SEVERAL DAYS
IF YOU CHECKED OFF ANY PROBLEMS ON THIS QUESTIONNAIRE, HOW DIFFICULT HAVE THESE PROBLEMS MADE IT FOR YOU TO DO YOUR WORK, TAKE CARE OF THINGS AT HOME, OR GET ALONG WITH OTHER PEOPLE: SOMEWHAT DIFFICULT
7. FEELING AFRAID AS IF SOMETHING AWFUL MIGHT HAPPEN: NOT AT ALL

## 2018-03-08 ASSESSMENT — PATIENT HEALTH QUESTIONNAIRE - PHQ9: 5. POOR APPETITE OR OVEREATING: NOT AT ALL

## 2018-03-08 ASSESSMENT — PAIN SCALES - GENERAL: PAINLEVEL: NO PAIN (0)

## 2018-03-08 NOTE — MR AVS SNAPSHOT
"              After Visit Summary   3/8/2018    Kathy Braswell    MRN: [id number]           Patient Information     Date Of Birth          1935        Visit Information        Provider Department      3/8/2018 2:30 PM Rio Ospina MD [facility]        Today's Diagnoses     Anxiety        Mild major depression (H)        Osteopenia, unspecified location        Essential hypertension, benign           Follow-ups after your visit        Future tests that were ordered for you today     Open Future Orders        Priority Expected Expires Ordered    DX Hip/Pelvis/Spine Routine  3/8/2019 3/8/2018            Who to contact     If you have questions or need follow up information about today's clinic visit or your schedule please contact [facility] directly at [phone].  Normal or non-critical lab and imaging results will be communicated to you by MyChart, letter or phone within 4 business days after the clinic has received the results. If you do not hear from us within 7 days, please contact the clinic through MyChart or phone. If you have a critical or abnormal lab result, we will notify you by phone as soon as possible.  Submit refill requests through [employer]([facility]) or call your pharmacy and they will forward the refill request to us. Please allow 3 business days for your refill to be completed.          Additional Information About Your Visit        Publish2hart Information     [employer] lets you send messages to your doctor, view your test results, renew your prescriptions, schedule appointments and more. To sign up, go to [email or web address] . Click on \"Log in\" on the left side of the screen, which will take you to the Welcome page. Then click on \"Sign up Now\" on the right side of the page.     You will be asked to enter the access code listed below, as well as some personal information. Please follow the directions to create your username and password.     Your access " "code is: [email or web address]  Expires: 2018  3:06 PM     Your access code will  in 90 days. If you need help or a new code, please call your [facility] or [phone].        Care EveryWhere ID     This is your Care EveryWhere ID. This could be used by other organizations to access your [place] medical records  [id number]        Your Vitals Were     Pulse Temperature Respirations Height BMI (Body Mass Index)       82 97.8  F (36.6  C) (Oral) 16 4' 9\" (1.448 m) 28.78 kg/m2        Blood Pressure from Last 3 Encounters:   18 132/68   17 128/72   16 130/70    Weight from Last 3 Encounters:   18 133 lb (60.3 kg)   17 136 lb (61.7 kg)   16 129 lb (58.5 kg)              We Performed the Following     Basic metabolic panel     DEPRESSION ACTION PLAN (DAP)          Where to get your medicines      These medications were sent to [facility] [address] [place] [address] & [address], [place] [address]     Phone:  [phone]     busPIRone 15 MG tablet    FLUoxetine 20 MG capsule    irbesartan-hydrochlorothiazide 150-12.5 MG per tablet         Some of these will need a paper prescription and others can be bought over the counter.  Ask your nurse if you have questions.     Bring a paper prescription for each of these medications     alendronate 70 MG tablet          Primary Care Provider Office Phone # Fax #    Rio Ospina [phone]       [address] [facility] [address]        Equal Access to Services     [facility]: Alma Madden, wanelson torres, emily kaalmanuha ellsworth, jennifer lima. So [facility] [phone].    ATENCIÓN: Si habla español, tiene a viveros disposición servicios gratuitos de asistencia lingüística. Llame al [phone].    We comply with applicable federal civil rights laws and [place] laws. We do not discriminate on the basis of race, " color, national origin, age, disability, sex, sexual orientation, or gender identity.            Thank you!     Thank you for choosing [facility]  for your care. Our goal is always to provide you with excellent care. Hearing back from our patients is one way we can continue to improve our services. Please take a few minutes to complete the written survey that you may receive in the mail after your visit with us. Thank you!             Your Updated Medication List - Protect others around you: Learn how to safely use, store and throw away your medicines at www.disposemymeds.org.          This list is accurate as of 3/8/18  3:06 PM.  Always use your most recent med list.                   Brand Name Dispense Instructions for use Diagnosis    alendronate 70 MG tablet    FOSAMAX    12 tablet    Take 1 tablet (70 mg) by mouth every 7 days Take with over 8 ounces water and stay upright for at least 30 minutes after dose.  Take at least 60 minutes before breakfast    Osteopenia, unspecified location       busPIRone 15 MG tablet    BUSPAR    90 tablet    1 tab daily    Anxiety       cholecalciferol 1000 UNIT tablet    vitamin D3     Take 1 tablet by mouth daily.        FLUoxetine 20 MG capsule    PROzac    90 capsule    Take 1 capsule (20 mg) by mouth daily    Anxiety       fluticasone 50 MCG/ACT spray    FLONASE    1 Package    Spray 1-2 sprays into both nostrils daily    Nasal congestion       irbesartan-hydrochlorothiazide 150-12.5 MG per tablet    AVALIDE    90 tablet    Take 1 tablet by mouth daily    Essential hypertension, benign       LORazepam 0.5 MG tablet    ATIVAN    30 tablet    TAKE 1 TABLET BY MOUTH EVERY DAY AS NEEDED    Anxiety       order for DME     1 each    BP cuff, brand as covered by insurance.  Dx: HTN    Essential hypertension, benign       TYLENOL 325 MG tablet   Generic drug:  acetaminophen      Take 1-2 tablets (325-650 mg) by mouth every 6 hours as needed for mild pain         zolpidem 6.25 MG CR tablet    AMBIEN CR    30 tablet    Take 1 tablet (6.25 mg) by mouth nightly as needed for sleep    Insomnia, unspecified

## 2018-03-08 NOTE — LETTER
[facility]  [address] [place] [address]  [phone]      3/8/2018       Kathy Braswell  [address]  [facility] [address]        Dear Kathy,  Here are your most recent lab results. Unless commented on below, mild variation of results  outside the normal range are not clinically signicant.    Resulted Orders   Basic metabolic panel   Result Value Ref Range    Sodium 134 133 - 144 mmol/L    Potassium 3.8 3.4 - 5.3 mmol/L    Chloride 99 94 - 109 mmol/L    Carbon Dioxide 30 20 - 32 mmol/L    Anion Gap 5 3 - 14 mmol/L    Glucose 87 70 - 99 mg/dL    Urea Nitrogen 19 7 - 30 mg/dL    Creatinine 0.60 0.52 - 1.04 mg/dL    GFR Estimate >90 >60 mL/min/1.7m2      Comment:      Non  GFR Calc    GFR Estimate If Black >90 >60 mL/min/1.7m2      Comment:       GFR Calc    Calcium 9.4 8.5 - 10.1 mg/dL       Electrolyte, Glucose/Sugar and Kidney function lab results were normal.  Continue current medication.  Please make an appointment to see me in 6 months  for follow-up of your anxiety symptoms or earlier as needed.    If you have further questions/concerns regarding the results, I would ask that you bring them to your next follow-up appointment with me and I would be happy to review them with you further.    Sincerely,    Rio Ospina MD  Internal Medicine

## 2018-03-08 NOTE — PROGRESS NOTES
"  SUBJECTIVE:   Kathy Braswell is a 82 year old female who presents to clinic today for the following health issues:    Chief Complaint   Patient presents with     Anxiety     Depression         Depression Followup    Status since last visit: Stable     See PHQ-9 for current symptoms.  Other associated symptoms: None    Complicating factors:   Significant life event:  No   Current substance abuse:  None  Anxiety or Panic symptoms:  No    PHQ-9 9/8/2015 7/14/2017 3/8/2018   Total Score 4 3 3   Q9: Suicide Ideation Not at all Not at all Not at all         Amount of exercise or physical activity: 2-3 days/week for an average of 45-60 minutes, Has not been active since Fx Shoulder    Problems taking medications regularly: No    Medication side effects: no muscle aches    Diet: regular (no restrictions)    Pt's past medical history, family history, habits, medications and allergies were reviewed with the patient today.  See snap shot for  HCM status. Most recent lab results reviewed with pt. Problem list and histories reviewed & adjusted, as indicated.  Additional history as below:    JUANPABLO = 3. PHQ = 3.  Taking Lorazepam once a day early Afternoon when sx worsens and then calms down well.  Tripped outside in December when when working some in yard and the snow. No other falls.   No confusion issues with Lorazepam. Memory seems OK  Sleeping OK overall. Using Ambien 1/2 tab  approx 2x/week  Denies chest pain, shortness of breath, abdominal pain, headache, vision changes or side effects with medications.  Very rare use of alcohol       Additional ROS:   Constitutional, HEENT, Cardiovascular, Pulmonary, GI and , Neuro, MSK and Psych review of systems/symptoms are otherwise negative or unchanged from previous, except as noted above.      OBJECTIVE:  /68  Pulse 82  Temp 97.8  F (36.6  C) (Oral)  Resp 16  Ht 4' 9\" (1.448 m)  Wt 133 lb (60.3 kg)  BMI 28.78 kg/m2   Estimated body mass index is 28.78 kg/(m^2) as " "calculated from the following:    Height as of this encounter: 4' 9\" (1.448 m).    Weight as of this encounter: 133 lb (60.3 kg).  Eye: PERRL, EOMI  HENT: ear canals and TM's normal and nose and mouth without ulcers or lesions   Neck: no adenopathy. Thyroid normal to palpation. No bruits  Pulm: Lungs clear to auscultation   CV: Regular rates and rhythm  GI: Soft, nontender, Normal active bowel sounds, No hepatosplenomegaly or masses palpable  Ext: Peripheral pulses intact. Trace BLE nonpitting edema.  Neuro: Normal strength and tone, sensory exam grossly normal. Alert and appropriate with answering questions. Stable gait  Gen: Calm affect    Assessment/Plan: (See plan discussion below for further details)  1. Anxiety  Well-controlled.  Long-term use of low-dose lorazepam and patient not having side effects with this.  We will therefore continue this along with buspirone and fluoxetine.  Patient has not tolerated trials of previous higher dose fluoxetine.   Patient will follow-up with me in 6 months or earlier as needed given controlled substance use  - busPIRone (BUSPAR) 15 MG tablet; 1 tab daily  Dispense: 90 tablet; Refill: 3  - FLUoxetine (PROZAC) 20 MG capsule; Take 1 capsule (20 mg) by mouth daily  Dispense: 90 capsule; Refill: 3    2. Mild major depression (H)   Controlled.  See PHQ above.  Continue Fluoxetine    3. Osteopenia, unspecified location  On Fosamax.  Due for repeat DEXA.  Failed previous drug holiday  - DX Hip/Pelvis/Spine; Future  - alendronate (FOSAMAX) 70 MG tablet; Take 1 tablet (70 mg) by mouth every 7 days Take with over 8 ounces water and stay upright for at least 30 minutes after dose.  Take at least 60 minutes before breakfast  Dispense: 12 tablet; Refill: 3    4. Essential hypertension, benign  Controlled.  Continue current medication.  Lab was ordered  - Basic metabolic panel  - irbesartan-hydrochlorothiazide (AVALIDE) 150-12.5 MG per tablet; Take 1 tablet by mouth daily  Dispense: 90 " tablet; Refill: 3         Rio Ospina MD  Internal Medicine Department  [facility] [place]

## 2018-03-09 ASSESSMENT — ANXIETY QUESTIONNAIRES: GAD7 TOTAL SCORE: 3

## 2018-03-09 ASSESSMENT — PATIENT HEALTH QUESTIONNAIRE - PHQ9: SUM OF ALL RESPONSES TO PHQ QUESTIONS 1-9: 3

## 2018-04-02 ENCOUNTER — TRANSFERRED RECORDS (OUTPATIENT)
Dept: HEALTH INFORMATION MANAGEMENT | Facility: CLINIC | Age: 83
End: 2018-04-02

## 2018-04-15 ENCOUNTER — HOSPITAL ENCOUNTER (EMERGENCY)
Facility: CLINIC | Age: 83
Discharge: HOME | End: 2018-04-15
Attending: EMERGENCY MEDICINE | Admitting: EMERGENCY MEDICINE
Payer: MEDICARE

## 2018-04-15 ENCOUNTER — APPOINTMENT (OUTPATIENT)
Dept: ULTRASOUND IMAGING | Facility: CLINIC | Age: 83
End: 2018-04-15
Attending: EMERGENCY MEDICINE
Payer: MEDICARE

## 2018-04-15 ENCOUNTER — NURSE TRIAGE (OUTPATIENT)
Dept: NURSING | Facility: CLINIC | Age: 83
End: 2018-04-15

## 2018-04-15 VITALS
RESPIRATION RATE: 16 BRPM | TEMPERATURE: 98.3 F | SYSTOLIC BLOOD PRESSURE: 151 MMHG | DIASTOLIC BLOOD PRESSURE: 71 MMHG | OXYGEN SATURATION: 94 % | BODY MASS INDEX: 28.34 KG/M2 | WEIGHT: 135 LBS | HEART RATE: 95 BPM | HEIGHT: 58 IN

## 2018-04-15 DIAGNOSIS — N30.01 ACUTE CYSTITIS WITH HEMATURIA: ICD-10-CM

## 2018-04-15 LAB
ALBUMIN UR-MCNC: 10 MG/DL
AMORPH CRY #/AREA URNS HPF: ABNORMAL /HPF
ANION GAP SERPL CALCULATED.3IONS-SCNC: 9 MMOL/L (ref 3–14)
APPEARANCE UR: ABNORMAL
BACTERIA #/AREA URNS HPF: ABNORMAL /HPF
BASOPHILS # BLD AUTO: 0 10E9/L (ref 0–0.2)
BASOPHILS NFR BLD AUTO: 0.3 %
BILIRUB UR QL STRIP: NEGATIVE
BUN SERPL-MCNC: 15 MG/DL (ref 7–30)
CALCIUM SERPL-MCNC: 8.7 MG/DL (ref 8.5–10.1)
CHLORIDE SERPL-SCNC: 101 MMOL/L (ref 94–109)
CO2 SERPL-SCNC: 29 MMOL/L (ref 20–32)
COLOR UR AUTO: YELLOW
CREAT SERPL-MCNC: 0.59 MG/DL (ref 0.52–1.04)
DIFFERENTIAL METHOD BLD: NORMAL
EOSINOPHIL # BLD AUTO: 0 10E9/L (ref 0–0.7)
EOSINOPHIL NFR BLD AUTO: 0.5 %
ERYTHROCYTE [DISTWIDTH] IN BLOOD BY AUTOMATED COUNT: 13 % (ref 10–15)
GFR SERPL CREATININE-BSD FRML MDRD: >90 ML/MIN/1.7M2
GLUCOSE SERPL-MCNC: 100 MG/DL (ref 70–99)
GLUCOSE UR STRIP-MCNC: NEGATIVE MG/DL
HCT VFR BLD AUTO: 38.7 % (ref 35–47)
HGB BLD-MCNC: 13.7 G/DL (ref 11.7–15.7)
HGB UR QL STRIP: ABNORMAL
IMM GRANULOCYTES # BLD: 0 10E9/L (ref 0–0.4)
IMM GRANULOCYTES NFR BLD: 0.2 %
INR PPP: 1.05 (ref 0.86–1.14)
KETONES UR STRIP-MCNC: NEGATIVE MG/DL
LEUKOCYTE ESTERASE UR QL STRIP: ABNORMAL
LYMPHOCYTES # BLD AUTO: 1.6 10E9/L (ref 0.8–5.3)
LYMPHOCYTES NFR BLD AUTO: 26.3 %
MCH RBC QN AUTO: 30.6 PG (ref 26.5–33)
MCHC RBC AUTO-ENTMCNC: 35.4 G/DL (ref 31.5–36.5)
MCV RBC AUTO: 87 FL (ref 78–100)
MONOCYTES # BLD AUTO: 0.8 10E9/L (ref 0–1.3)
MONOCYTES NFR BLD AUTO: 12.7 %
MUCOUS THREADS #/AREA URNS LPF: PRESENT /LPF
NEUTROPHILS # BLD AUTO: 3.5 10E9/L (ref 1.6–8.3)
NEUTROPHILS NFR BLD AUTO: 60 %
NITRATE UR QL: NEGATIVE
NRBC # BLD AUTO: 0 10*3/UL
NRBC BLD AUTO-RTO: 0 /100
PH UR STRIP: 7.5 PH (ref 5–7)
PLATELET # BLD AUTO: 250 10E9/L (ref 150–450)
POTASSIUM SERPL-SCNC: 3.2 MMOL/L (ref 3.4–5.3)
RBC # BLD AUTO: 4.47 10E12/L (ref 3.8–5.2)
RBC #/AREA URNS AUTO: 161 /HPF (ref 0–2)
SODIUM SERPL-SCNC: 139 MMOL/L (ref 133–144)
SOURCE: ABNORMAL
SP GR UR STRIP: 1.01 (ref 1–1.03)
UROBILINOGEN UR STRIP-MCNC: NORMAL MG/DL (ref 0–2)
WBC # BLD AUTO: 5.9 10E9/L (ref 4–11)
WBC #/AREA URNS AUTO: 90 /HPF (ref 0–5)
WBC CLUMPS #/AREA URNS HPF: PRESENT /HPF

## 2018-04-15 PROCEDURE — 85610 PROTHROMBIN TIME: CPT | Performed by: EMERGENCY MEDICINE

## 2018-04-15 PROCEDURE — 76856 US EXAM PELVIC COMPLETE: CPT

## 2018-04-15 PROCEDURE — 85025 COMPLETE CBC W/AUTO DIFF WBC: CPT | Performed by: EMERGENCY MEDICINE

## 2018-04-15 PROCEDURE — 81001 URINALYSIS AUTO W/SCOPE: CPT | Performed by: EMERGENCY MEDICINE

## 2018-04-15 PROCEDURE — 25000128 H RX IP 250 OP 636: Performed by: EMERGENCY MEDICINE

## 2018-04-15 PROCEDURE — 80048 BASIC METABOLIC PNL TOTAL CA: CPT | Performed by: EMERGENCY MEDICINE

## 2018-04-15 PROCEDURE — 96374 THER/PROPH/DIAG INJ IV PUSH: CPT

## 2018-04-15 PROCEDURE — 87086 URINE CULTURE/COLONY COUNT: CPT | Performed by: EMERGENCY MEDICINE

## 2018-04-15 PROCEDURE — 99285 EMERGENCY DEPT VISIT HI MDM: CPT | Mod: 25

## 2018-04-15 PROCEDURE — 25000132 ZZH RX MED GY IP 250 OP 250 PS 637: Mod: GY | Performed by: EMERGENCY MEDICINE

## 2018-04-15 PROCEDURE — A9270 NON-COVERED ITEM OR SERVICE: HCPCS | Mod: GY | Performed by: EMERGENCY MEDICINE

## 2018-04-15 RX ORDER — CEPHALEXIN 500 MG/1
500 CAPSULE ORAL 4 TIMES DAILY
Qty: 20 CAPSULE | Refills: 0 | Status: SHIPPED | OUTPATIENT
Start: 2018-04-15 | End: 2018-04-20

## 2018-04-15 RX ORDER — LORAZEPAM 2 MG/ML
0.5 INJECTION INTRAMUSCULAR ONCE
Status: COMPLETED | OUTPATIENT
Start: 2018-04-15 | End: 2018-04-15

## 2018-04-15 RX ORDER — CEPHALEXIN 500 MG/1
500 CAPSULE ORAL ONCE
Status: COMPLETED | OUTPATIENT
Start: 2018-04-15 | End: 2018-04-15

## 2018-04-15 RX ADMIN — LORAZEPAM 0.5 MG: 2 INJECTION INTRAMUSCULAR; INTRAVENOUS at 16:03

## 2018-04-15 RX ADMIN — CEPHALEXIN 500 MG: 500 CAPSULE ORAL at 16:05

## 2018-04-15 ASSESSMENT — ENCOUNTER SYMPTOMS
FEVER: 0
ABDOMINAL PAIN: 0
NAUSEA: 0
WEAKNESS: 1
HEMATURIA: 1
ABDOMINAL DISTENTION: 0
FATIGUE: 1
DIARRHEA: 0
DIZZINESS: 0
SHORTNESS OF BREATH: 0
VOMITING: 0
DYSURIA: 1
CONSTIPATION: 0

## 2018-04-15 NOTE — ED PROVIDER NOTES
History     Chief Complaint:  Hematuria    HPI   Kathy Braswell is a 82 year old female with a history of hypertension, osteopenia, non cancerous colon polyps, and irritable bowel syndrome who presents to the emergency department for evaluation of hematuria. The patient reports that this has been going on for the past two days without pain. She is passing clots, beginning yesterday morning--this was one round clot with gio blood in addition. She reports she feels that this is in her urine although she can't be sure it is not vaginal bleeding. She reports she has a normal bowel movement this AM without any blood.  Kathy endorses fatigue and decrease in energy, urgency, and dysuria when she has been hold her urine too long. She denies any history of hematuria in her past, abdominal pain, abdominal distension, nausea, vomiting, dizziness, a history of abdominal surgeries, diarrhea, shortness of breath, chest pain, and fever.    Allergies:  Citalopram  Codeine  Cymbalta  Lisinopril  Metoprolol Succinate  Trazodone Hcl  Valsartan      Medications:    Buspirone  Avalide  Fosamax  Fluoxetine  Ativan  Ambien      Past Medical History:    Allergic rhinitis  Closed compression fracture of L1 lumbar vertebral body  Esophageal reflux  Essential hypertension   Generalized osteoarthrosis  History of colon polyp  Insomnia  Irritable bowel syndrome  Labyrinthitis   Mild major depression  Osteopenia  Pubic ramus fracture  Closed fracture of ankle     Past Surgical History:    Excision of cyst  Dilation and curettage after miscarriage and menometrorrhagia  Colonoscopy  Phacoemulsification clear cornea with standard intraocular lens implant x2    Family History:    Hypertension - Mother  Prostate cancer- Father     Social History:  The patient was accompanied to the ED by her sister.  Smoking Status: Never  Smokeless Tobacco: Never  Alcohol Use: Yes   Marital Status:        Review of Systems   Constitutional: Positive for  "fatigue. Negative for fever.   Respiratory: Negative for shortness of breath.    Cardiovascular: Negative for chest pain.   Gastrointestinal: Negative for abdominal distention, abdominal pain, constipation, diarrhea, nausea and vomiting.   Genitourinary: Positive for dysuria, hematuria and urgency.   Neurological: Positive for weakness. Negative for dizziness.   All other systems reviewed and are negative.    Physical Exam     Patient Vitals for the past 24 hrs:   BP Temp Pulse Resp SpO2 Height Weight   04/15/18 1332 151/71 98.3  F (36.8  C) 103 16 93 % 1.461 m (4' 9.5\") 61.2 kg (135 lb)        Physical Exam  General: Well appearing, nontoxic.  Resting comfortably  Head:  Scalp, face, and head appear normal  Eyes:  Pupils are equal, round    Conjunctivae non-injected and sclerae white  ENT:    The external nose is normal    Pinnae are normal    The oropharynx is normal, mucous membranes moist    Uvula is in the midline  Neck:  Normal range of motion    There is no rigidity noted    Trachea is in the midline  CV:  Regular rate and rhythm     Normal S1/S2, no S3/S4    No murmur or rub  Resp:  Lungs are clear and equal bilaterally    There is no tachypnea    No increased work of breathing    No rales, wheezing, or rhonchi  GI:  Abdomen is soft, no rigidity or guarding    No distension, or mass    No tenderness or rebound tenderness   MS:  Normal muscular tone  :  Normal external female genitalia without rash or lesions. Vaginal mucosa normal. Cervical os closed without   discharge or bleeding. No cervical lesions.    Symmetric motor strength    No lower extremity edema  Skin:  No rash or acute skin lesions noted  Neuro: Awake and alert    Speech is normal and fluent    Moves all extremities spontaneously  Psych:  Normal affect.  Appropriate interactions.      Emergency Department Course     Imaging:  Radiology findings were communicated with the patient and family who voiced understanding of the findings.    US " Pelvic Complete with Transvaginal & Doppler LmtPel Duplex Limited:  IMPRESSION:  Atrophic uterus and ovaries.  Report per radiology     Laboratory:  Laboratory findings were communicated with the patient and family who voiced understanding of the findings.    CBC: AWNL. (WBC 5.9, HGB 13.7, )   BMP: Potassium 3.2 (L), Glucose 100 (H) o/w WNL (Creatinine 0.59)    INR: 1.05  UA Macroscopic: Yellow, slightly cloudy urine. Blood urine moderate, pH urine 7.5 (H), Protein Albumin Urine, Leukocyte Esterase Urine large, RBC Urine 161 (H)m WBC Urine 90 (H), WBC Clumps present, Bacteria urine few, Mucous urine present, Amorphous crystals few 10 o/w WNL    Urine Culture Aerobic Bacterial: Pending    Interventions:  1603 - Ativan 0.5mg PO  1605 - Keflex 500mg PO      Emergency Department Course:  Nursing notes and vitals reviewed.  The patient provided a urine sample here in the emergency department. This was sent for laboratory testing, findings above.  IV was inserted and blood was drawn for laboratory testing, results above.  The patient was sent for a US Pelvic Complete with Transvaginal & Doppler LmtPel Duplex Limited while in the emergency department, results above.     1427: I performed an exam of the patient as documented above.   1434: I performed a pelvic exam of the patient.  1550: Patient rechecked and updated.     Findings and plan explained to the Patient and sister. Patient discharged home with instructions regarding supportive care, medications, and reasons to return. The importance of close follow-up was reviewed. The patient was prescribed Keflex.   I personally reviewed the laboratory and imaging results with the Patient and sister and answered all related questions prior to discharge.     Impression & Plan      Medical Decision Making:  Kathy Braswell is a 82 year old female who presents to the emergency department today for evaluation of hematuria, ongoing for three days. This patient has symptoms  consistent with a urinary tract infection, including hematuria. Pelvic exam performed with no evidence for vaginal/uterine bleeding. Pelvic US unremarkable. Urinalysis confirms infection.  There has been no fever, back/flank pain, lightheadedness, or significant abdominal pain.  There is no clinical evidence of pyelonephritis, appendicitis,  or diverticulitis.  Not septic. No history suggesting rectal bleeding. The patient will be started on antibiotics for the infection. First dose in the ED. Follow up with primary physician is indicated if not improving in 2-3 days. Return immediately to ER if increasing pain, vomiting, fever, inability to tolerate the oral antibiotic, or for other concerns. Return precautions were discussed with patient. The patient's questions were answered and the patient was agreeable with discharge.    Diagnosis:    ICD-10-CM    1. Acute cystitis with hematuria N30.01        Disposition:  Discharged to home.    Discharge Medications:  New Prescriptions    CEPHALEXIN (KEFLEX) 500 MG CAPSULE    Take 1 capsule (500 mg) by mouth 4 times daily for 5 days       Scribe Disclosure:  Shalini DOUGHERTY, am serving as a scribe at 2:09 PM on 4/15/2018 to document services personally performed by Gilbert Parra MD based on my observations and the provider's statements to me.   4/15/2018    EMERGENCY DEPARTMENT       Gilbert Parra MD  04/16/18 8212

## 2018-04-15 NOTE — TELEPHONE ENCOUNTER
Reason for Disposition    Passing pure blood or large blood clots (i.e., size > a dime) (Exception: mary or small strands)    Additional Information    Negative: Shock suspected (e.g., cold/pale/clammy skin, too weak to stand, low BP, rapid pulse)    Negative: Sounds like a life-threatening emergency to the triager    Negative: Urinary catheter, questions about    Negative: Recent back or abdominal injury    Negative: Recent genital injury    Negative: [1] Unable to urinate (or only a few drops) > 4 hours AND [2] bladder feels very full (e.g., palpable bladder or strong urge to urinate)    Protocols used: URINE - BLOOD IN-ADULT-

## 2018-04-15 NOTE — ED AVS SNAPSHOT
Emergency Department    [address] [facility] [address]    Phone:  [phone]    Fax:  [phone]                                       Kathy Braswell   MRN: [id number]    Department:   Emergency Department   Date of Visit:  4/15/2018           After Visit Summary Signature Page     I have received my discharge instructions, and my questions have been answered. I have discussed any challenges I see with this plan with the nurse or doctor.    ..........................................................................................................................................  Patient/Patient Representative Signature      ..........................................................................................................................................  Patient Representative Print Name and Relationship to Patient    ..................................................               ................................................  Date                                            Time    ..........................................................................................................................................  Reviewed by Signature/Title    ...................................................              ..............................................  Date                                                            Time

## 2018-04-15 NOTE — ED AVS SNAPSHOT
Emergency Department    [address] [facility] [address]    Phone:  [phone]    Fax:  [phone]                                       Kathy Braswell   MRN: [id number]    Department:   Emergency Department   Date of Visit:  4/15/2018           Patient Information     Date Of Birth          1935        Your diagnoses for this visit were:     Acute cystitis with hematuria        You were seen by Doris Linda MD and Gilbert Parra MD.      Follow-up Information     Follow up with Rio Ospina MD. Schedule an appointment as soon as possible for a visit in 1 week.    Specialty:  Internal Medicine    Contact information:    [address] [facility] [address] [phone]          Go to  Emergency Department.    Specialty:  EMERGENCY MEDICINE    Why:  If symptoms worsen    Contact information:    [address] [facility] [address] [phone]        Discharge Instructions       Your primary care physician should recheck your urine in 1 week to make sure the infection and blood has gone away. If there is still blood in the urine you may need further testing or treatment.    Discharge Instructions  Urinary Tract Infection  You or your child have been diagnosed with a urinary tract infection, or UTI. The urinary tract includes the kidneys (which make urine/pee), ureters (the tubes that carry urine/pee from the kidneys to the bladder), the bladder (which stores urine/pee), and urethra (the tube that carries urine/pee out of the bladder). Urinary tract infections occur when bacteria travel up the urethra into the bladder (bladder infection) and, in some cases, from there into the kidneys (kidney infection).  Generally, every Emergency Department visit should have a follow-up clinic visit with either a primary or a specialty clinic/provider. Please follow-up as instructed by your emergency provider today.  Return to the Emergency Department if:    You or your child  have severe back pain.    You or your child are vomiting (throwing up) so that you cannot take your medicine.    You or your child have a new fever (had not previously had a fever) over 101 F.    You or your child have confusion or are very weak, or feel very ill.    Your child seems much more ill, will not wake up, will not respond right, or is crying for a long time and will not calm down.    You or your child are showing signs of dehydration. These signs may include decreased urination (pee), dry mouth/gums/tongue, or decreased activity.    Follow-up with your provider:     Children under 24 months need to be seen by their regular provider within one week after a diagnosis of a UTI. It may be necessary to do some more tests to look at the child s kidney or bladder.    You should begin to feel better within 24 - 48 hours of starting your antibiotic; follow-up with your regular clinic/doctor/provider if this is not the case.    Treatment:     You will be treated with an antibiotic to kill the bacteria. We have to make an educated guess, based on what we know about common bacteria and antibiotics, as to which antibiotic will work for your infection. We will be correct most times but there will be some cases where the antibiotic chosen is not correct (see urine cultures below).    Take a pain medication such as acetaminophen (Tylenol ) or ibuprofen (Advil , Motrin , Nuprin ).    Phenazopyridine (Pyridium , Uristat ) is a prescription medication that numbs the bladder to reduce the burning pain of some UTIs.  The same medication is available in a non-prescription version (Azo-Standard , Urodol ). This medication will change the color of the urine and tears (usually blue or orange). If you wear contacts, do not wear them while taking this medication as they may be stained by the medication.    Urine Cultures:    If indicated, a urine culture may have been performed today. This test generally takes 24-48 hours to  "complete so the results are not known at this time. The results can confirm that an infection is present but also determine which antibiotic is effective for the specific bacteria that is causing the infection. If your urine culture shows that the antibiotic you were given today will not work to treat your infection, we will attempt to contact you to make arrangements to change the antibiotic. If the culture confirms that the antibiotic is effective for your infection, you will not be contacted. We often recommend follow-up with your regular physician/provider on the culture results regardless of this process.    Antibiotic Warning:     If you have been placed on antibiotics - watch for signs of allergic reaction.  These include rash, lip swelling, difficulty breathing, wheezing, and dizziness.  If you develop any of these symptoms, stop the antibiotic immediately and go to an emergency room or urgent care for evaluation.    Probiotics: If you have been given an antibiotic, you may want to also take a probiotic pill or eat yogurt with live cultures. Probiotics have \"good bacteria\" to help your intestines stay healthy. Studies have shown that probiotics help prevent diarrhea and other intestine problems (including C. diff infection) when you take antibiotics. You can buy these without a prescription in the pharmacy section of the store.   If you were given a prescription for medicine here today, be sure to read all of the information (including the package insert) that comes with your prescription.  This will include important information about the medicine, its side effects, and any warnings that you need to know about.  The pharmacist who fills the prescription can provide more information and answer questions you may have about the medicine.  If you have questions or concerns that the pharmacist cannot address, please call or return to the Emergency Department.   Remember that you can always come back to the " Emergency Department if you are not able to see your regular provider in the amount of time listed above, if you get any new symptoms, or if there is anything that worries you.      Your next 10 appointments already scheduled     May 09, 2018  2:00 PM CDT   [id number]    [address] [id number] [phone]           Please do not take any of the following 24 hours prior to the day of your exam: vitamins, calcium tablets, antacids.  If possible, please wear clothes without metal (snaps, zippers). A sweatsuit works well.              24 Hour Appointment Hotline       To make an appointment at any [facility], call [phone] ([phone]). If you don't have a family doctor or clinic, we will help you find one. [facility] are conveniently located to serve the needs of you and your family.             Review of your medicines      START taking        Dose / Directions Last dose taken    cephALEXin 500 MG capsule   Commonly known as:  KEFLEX   Dose:  500 mg   Indication:  Urinary Tract Infection   Quantity:  20 capsule        Take 1 capsule (500 mg) by mouth 4 times daily for 5 days   Refills:  0          Our records show that you are taking the medicines listed below. If these are incorrect, please call your family doctor or clinic.        Dose / Directions Last dose taken    alendronate 70 MG tablet   Commonly known as:  FOSAMAX   Dose:  70 mg   Quantity:  12 tablet        Take 1 tablet (70 mg) by mouth every 7 days Take with over 8 ounces water and stay upright for at least 30 minutes after dose.  Take at least 60 minutes before breakfast   Refills:  3        busPIRone 15 MG tablet   Commonly known as:  BUSPAR   Quantity:  90 tablet        1 tab daily   Refills:  3        cholecalciferol 1000 UNIT tablet   Commonly known as:  vitamin D3   Dose:  1 tablet        Take 1 tablet by  mouth daily.   Refills:  0        FLUoxetine 20 MG capsule   Commonly known as:  PROzac   Dose:  20 mg   Quantity:  90 capsule        Take 1 capsule (20 mg) by mouth daily   Refills:  3        fluticasone 50 MCG/ACT spray   Commonly known as:  FLONASE   Dose:  1-2 spray   Quantity:  1 Package        Spray 1-2 sprays into both nostrils daily   Refills:  1        irbesartan-hydrochlorothiazide 150-12.5 MG per tablet   Commonly known as:  AVALIDE   Dose:  1 tablet   Quantity:  90 tablet        Take 1 tablet by mouth daily   Refills:  3        LORazepam 0.5 MG tablet   Commonly known as:  ATIVAN   Quantity:  30 tablet        TAKE 1 TABLET BY MOUTH EVERY DAY AS NEEDED   Refills:  1        order for DME   Quantity:  1 each        BP cuff, brand as covered by insurance.  Dx: HTN   Refills:  0        TYLENOL 325 MG tablet   Dose:  325-650 mg   Generic drug:  acetaminophen        Take 1-2 tablets (325-650 mg) by mouth every 6 hours as needed for mild pain   Refills:  0        zolpidem 6.25 MG CR tablet   Commonly known as:  AMBIEN CR   Dose:  6.25 mg   Quantity:  30 tablet        Take 1 tablet (6.25 mg) by mouth nightly as needed for sleep   Refills:  3                Prescriptions were sent or printed at these locations (1 Prescription)                   Other Prescriptions                Printed at Department/Unit printer (1 of 1)         cephALEXin (KEFLEX) 500 MG capsule                Procedures and tests performed during your visit     Basic metabolic panel    Bladder scan    CBC with platelets differential    INR    Peripheral IV catheter    UA reflex to Microscopic and Culture    US Pelvis Complete without Transvaginal    Urine Culture Aerobic Bacterial      Orders Needing Specimen Collection     None      Pending Results     Date and Time Order Name Status Description    4/15/2018 1443 Urine Culture Aerobic Bacterial In process             Pending Culture Results     Date and Time Order Name Status Description     4/15/2018 1443 Urine Culture Aerobic Bacterial In process             Pending Results Instructions     If you had any lab results that were not finalized at the time of your Discharge, you can call the ED Lab Result RN at [phone]. You will be contacted by this team for any positive Lab results or changes in treatment. The nurses are available 7 days a week from 10A to 6:30P.  You can leave a message 24 hours per day and they will return your call.        Test Results From Your Hospital Stay        4/15/2018  2:20 PM      Component Results     Component Value Ref Range & Units Status    WBC 5.9 4.0 - 11.0 10e9/L Final    RBC Count 4.47 3.8 - 5.2 10e12/L Final    Hemoglobin 13.7 11.7 - 15.7 g/dL Final    Hematocrit 38.7 35.0 - 47.0 % Final    MCV 87 78 - 100 fl Final    MCH 30.6 26.5 - 33.0 pg Final    MCHC 35.4 31.5 - 36.5 g/dL Final    RDW 13.0 10.0 - 15.0 % Final    Platelet Count 250 150 - 450 10e9/L Final    Diff Method Automated Method  Final    % Neutrophils 60.0 % Final    % Lymphocytes 26.3 % Final    % Monocytes 12.7 % Final    % Eosinophils 0.5 % Final    % Basophils 0.3 % Final    % Immature Granulocytes 0.2 % Final    Nucleated RBCs 0 0 /100 Final    Absolute Neutrophil 3.5 1.6 - 8.3 10e9/L Final    Absolute Lymphocytes 1.6 0.8 - 5.3 10e9/L Final    Absolute Monocytes 0.8 0.0 - 1.3 10e9/L Final    Absolute Eosinophils 0.0 0.0 - 0.7 10e9/L Final    Absolute Basophils 0.0 0.0 - 0.2 10e9/L Final    Abs Immature Granulocytes 0.0 0 - 0.4 10e9/L Final    Absolute Nucleated RBC 0.0  Final         4/15/2018  2:29 PM      Component Results     Component Value Ref Range & Units Status    INR 1.05 0.86 - 1.14 Final         4/15/2018  2:32 PM      Component Results     Component Value Ref Range & Units Status    Sodium 139 133 - 144 mmol/L Final    Potassium 3.2 (L) 3.4 - 5.3 mmol/L Final    Chloride 101 94 - 109 mmol/L Final    Carbon Dioxide 29 20 - 32 mmol/L Final    Anion Gap 9 3 - 14 mmol/L Final    Glucose  100 (H) 70 - 99 mg/dL Final    Urea Nitrogen 15 7 - 30 mg/dL Final    Creatinine 0.59 0.52 - 1.04 mg/dL Final    GFR Estimate >90 >60 mL/min/1.7m2 Final    Non  GFR Calc    GFR Estimate If Black >90 >60 mL/min/1.7m2 Final    African American GFR Calc    Calcium 8.7 8.5 - 10.1 mg/dL Final         4/15/2018  3:07 PM      Component Results     Component Value Ref Range & Units Status    Color Urine Yellow  Final    Appearance Urine Slightly Cloudy  Final    Glucose Urine Negative NEG^Negative mg/dL Final    Bilirubin Urine Negative NEG^Negative Final    Ketones Urine Negative NEG^Negative mg/dL Final    Specific Gravity Urine 1.011 1.003 - 1.035 Final    Blood Urine Moderate (A) NEG^Negative Final    pH Urine 7.5 (H) 5.0 - 7.0 pH Final    Protein Albumin Urine 10 (A) NEG^Negative mg/dL Final    Urobilinogen mg/dL Normal 0.0 - 2.0 mg/dL Final    Nitrite Urine Negative NEG^Negative Final    Leukocyte Esterase Urine Large (A) NEG^Negative Final    Source Catheterized Urine  Final    RBC Urine 161 (H) 0 - 2 /HPF Final    WBC Urine 90 (H) 0 - 5 /HPF Final    WBC Clumps Present (A) NEG^Negative /HPF Final    Bacteria Urine Few (A) NEG^Negative /HPF Final    Mucous Urine Present (A) NEG^Negative /LPF Final    Amorphous Crystals Few (A) NEG^Negative /HPF Final               4/15/2018  3:08 PM         4/15/2018  3:50 PM      Narrative     PELVIC ULTRASOUND   4/15/2018 3:18 PM     HISTORY: Vaginal bleeding versus blood in urine.    COMPARISON: None.    FINDINGS:  Uterus is atrophic.  No focal lesions are seen in the  myometrium.  Endometrium is 3 mm thick and atrophic.      Ovaries are small and atrophic with no focal lesions.  There are no  adnexal masses.  There is no free fluid in the cul-de-sac.        Impression     IMPRESSION:  Atrophic uterus and ovaries.    ABIMBOLA MARKS MD                Clinical Quality Measure: Blood Pressure Screening     Your blood pressure was checked while you were in the  "emergency department today. The last reading we obtained was  BP: 151/71 . Please read the guidelines below about what these numbers mean and what you should do about them.  If your systolic blood pressure (the top number) is less than 120 and your diastolic blood pressure (the bottom number) is less than 80, then your blood pressure is normal. There is nothing more that you need to do about it.  If your systolic blood pressure (the top number) is 120-139 or your diastolic blood pressure (the bottom number) is 80-89, your blood pressure may be higher than it should be. You should have your blood pressure rechecked within a year by a primary care provider.  If your systolic blood pressure (the top number) is 140 or greater or your diastolic blood pressure (the bottom number) is 90 or greater, you may have high blood pressure. High blood pressure is treatable, but if left untreated over time it can put you at risk for heart attack, stroke, or kidney failure. You should have your blood pressure rechecked by a primary care provider within the next 4 weeks.  If your provider in the emergency department today gave you specific instructions to follow-up with your doctor or provider even sooner than that, you should follow that instruction and not wait for up to 4 weeks for your follow-up visit.        Thank you for choosing [facility]       Thank you for choosing [facility] for your care. Our goal is always to provide you with excellent care. Hearing back from our patients is one way we can continue to improve our services. Please take a few minutes to complete the written survey that you may receive in the mail after you visit with us. Thank you!        [employer] Messenger Information     "[employer] lets you send messages to your doctor, view your test results, renew your prescriptions, schedule appointments and more. To sign up, go to [email or web address] . Click on \"Log in\" on the left side of the screen, which will take you to the " "Welcome page. Then click on \"Sign up Now\" on the right side of the page.     You will be asked to enter the access code listed below, as well as some personal information. Please follow the directions to create your username and password.     Your access code is: [email or web address]  Expires: 2018  4:06 PM     Your access code will  in 90 days. If you need help or a new code, please call your [facility] clinic or [phone].        Care EveryWhere ID     This is your Care EveryWhere ID. This could be used by other organizations to access your [facility] medical records  [id number]        Equal Access to Services     AMITA GARCIA : Alma Madden, damian torres, emily ellsworth, jennifer lima. So [facility] [phone].    ATENCIÓN: Si habla español, tiene a viveros disposición servicios gratuitos de asistencia lingüística. Llame al [phone].    We comply with applicable federal civil rights laws and [place] laws. We do not discriminate on the basis of race, color, national origin, age, disability, sex, sexual orientation, or gender identity.            After Visit Summary       This is your record. Keep this with you and show to your community pharmacist(s) and doctor(s) at your next visit.                  "

## 2018-04-15 NOTE — ED NOTES
Pelvic exam completed by myself and & Dr. Parra. Pt straight catheterized under sterile technique due to urinary incontinence and inability to provide clean catch specimen. Specimen obtained and sent to the lab.

## 2018-04-15 NOTE — TELEPHONE ENCOUNTER
"\"I started passing a small amount of blood in my urine Friday (4/13/18). This morning I had 3 clots kind of stringy.\" Patient reporting urinary frequency. 1 \"quarter size clot.\" Afebrile. Denies pain or cramping.   Per guidelines Emergency Room advised.    Bing Mendoza RN  [facility]      "

## 2018-04-15 NOTE — DISCHARGE INSTRUCTIONS
Your primary care physician should recheck your urine in 1 week to make sure the infection and blood has gone away. If there is still blood in the urine you may need further testing or treatment.    Discharge Instructions  Urinary Tract Infection  You or your child have been diagnosed with a urinary tract infection, or UTI. The urinary tract includes the kidneys (which make urine/pee), ureters (the tubes that carry urine/pee from the kidneys to the bladder), the bladder (which stores urine/pee), and urethra (the tube that carries urine/pee out of the bladder). Urinary tract infections occur when bacteria travel up the urethra into the bladder (bladder infection) and, in some cases, from there into the kidneys (kidney infection).  Generally, every Emergency Department visit should have a follow-up clinic visit with either a primary or a specialty clinic/provider. Please follow-up as instructed by your emergency provider today.  Return to the Emergency Department if:    You or your child have severe back pain.    You or your child are vomiting (throwing up) so that you cannot take your medicine.    You or your child have a new fever (had not previously had a fever) over 101 F.    You or your child have confusion or are very weak, or feel very ill.    Your child seems much more ill, will not wake up, will not respond right, or is crying for a long time and will not calm down.    You or your child are showing signs of dehydration. These signs may include decreased urination (pee), dry mouth/gums/tongue, or decreased activity.    Follow-up with your provider:     Children under 24 months need to be seen by their regular provider within one week after a diagnosis of a UTI. It may be necessary to do some more tests to look at the child s kidney or bladder.    You should begin to feel better within 24 - 48 hours of starting your antibiotic; follow-up with your regular clinic/doctor/provider if this is not the  case.    Treatment:     You will be treated with an antibiotic to kill the bacteria. We have to make an educated guess, based on what we know about common bacteria and antibiotics, as to which antibiotic will work for your infection. We will be correct most times but there will be some cases where the antibiotic chosen is not correct (see urine cultures below).    Take a pain medication such as acetaminophen (Tylenol ) or ibuprofen (Advil , Motrin , Nuprin ).    Phenazopyridine (Pyridium , Uristat ) is a prescription medication that numbs the bladder to reduce the burning pain of some UTIs.  The same medication is available in a non-prescription version (Azo-Standard , Urodol ). This medication will change the color of the urine and tears (usually blue or orange). If you wear contacts, do not wear them while taking this medication as they may be stained by the medication.    Urine Cultures:    If indicated, a urine culture may have been performed today. This test generally takes 24-48 hours to complete so the results are not known at this time. The results can confirm that an infection is present but also determine which antibiotic is effective for the specific bacteria that is causing the infection. If your urine culture shows that the antibiotic you were given today will not work to treat your infection, we will attempt to contact you to make arrangements to change the antibiotic. If the culture confirms that the antibiotic is effective for your infection, you will not be contacted. We often recommend follow-up with your regular physician/provider on the culture results regardless of this process.    Antibiotic Warning:     If you have been placed on antibiotics - watch for signs of allergic reaction.  These include rash, lip swelling, difficulty breathing, wheezing, and dizziness.  If you develop any of these symptoms, stop the antibiotic immediately and go to an emergency room or urgent care for evaluation.   "  Probiotics: If you have been given an antibiotic, you may want to also take a probiotic pill or eat yogurt with live cultures. Probiotics have \"good bacteria\" to help your intestines stay healthy. Studies have shown that probiotics help prevent diarrhea and other intestine problems (including C. diff infection) when you take antibiotics. You can buy these without a prescription in the pharmacy section of the store.   If you were given a prescription for medicine here today, be sure to read all of the information (including the package insert) that comes with your prescription.  This will include important information about the medicine, its side effects, and any warnings that you need to know about.  The pharmacist who fills the prescription can provide more information and answer questions you may have about the medicine.  If you have questions or concerns that the pharmacist cannot address, please call or return to the Emergency Department.   Remember that you can always come back to the Emergency Department if you are not able to see your regular provider in the amount of time listed above, if you get any new symptoms, or if there is anything that worries you.    "

## 2018-04-16 ENCOUNTER — TELEPHONE (OUTPATIENT)
Dept: INTERNAL MEDICINE | Facility: CLINIC | Age: 83
End: 2018-04-16

## 2018-04-16 LAB
BACTERIA SPEC CULT: NO GROWTH
Lab: NORMAL
SPECIMEN SOURCE: NORMAL

## 2018-04-16 NOTE — TELEPHONE ENCOUNTER
Schedule pt to be seen in hospital f/u appt slot next Monday or Tuesday to re-eval after has been on abx therapy. Pt to call if clinically worsens prior

## 2018-04-16 NOTE — TELEPHONE ENCOUNTER
Reason for call:  Appointment   Requested Provider: Dr. Ospina    PCP: [unfilled]Dr. Ospina    Reason for visit: ER follow up 1 wk          Additional comments: Please call with appointment      Phone number to reach patient:  Home number on file [phone] (home)    Best Time:  anytime    Can we leave a detailed message on this number?  YES

## 2018-04-17 ENCOUNTER — TELEPHONE (OUTPATIENT)
Dept: EMERGENCY MEDICINE | Facility: CLINIC | Age: 83
End: 2018-04-17

## 2018-04-17 NOTE — TELEPHONE ENCOUNTER
"[facility] Emergency Department Lab result notification:    [place] ED lab result protocol used  Urine Culture Protocol    Reason for call  Notify of lab results, assess symptoms,  review ED providers recommendations/discharge instructions (if necessary) and advise per ED lab result f/u protocol    Lab Result  Final urine culture report shows \"NO GROWTH\" and is NEGATIVE.  Emergency Dept discharge antibiotic: Cephalexin (Keflex) 500 mg capsule, 1 capsule (500 mg) by mouth 4 times daily for 5 days.  Is ED discharge Rx antibiotic for UTI only (Yes/No): Yes  Recommendations per [place] ED Lab result protocol - Urine culture protocol.  Information table from ED Provider visit on 04/15/2018  Symptoms reported at ED visit (Chief complaint, HPI)  82 year old female with a history of hypertension, osteopenia, non cancerous colon polyps, and irritable bowel syndrome who presents to the emergency department for evaluation of hematuria. The patient reports that this has been going on for the past two days without pain. She is passing clots, beginning yesterday morning--this was one round clot with gio blood in addition. She reports she feels that this is in her urine although she can't be sure it is not vaginal bleeding. She reports she has a normal bowel movement this AM without any blood.  Kathy endorses fatigue and decrease in energy, urgency, and dysuria when she has been hold her urine too long. She denies any history of hematuria in her past, abdominal pain, abdominal distension, nausea, vomiting, dizziness, a history of abdominal surgeries, diarrhea, shortness of breath, chest pain, and fever   ED providers Impression and Plan (applicable information) a 82 year old female who presents to the emergency department today for evaluation of hematuria, ongoing for three days. This patient has symptoms consistent with a urinary tract infection, including hematuria. Pelvic exam performed with no evidence for " vaginal/uterine bleeding. Pelvic US unremarkable. Urinalysis confirms infection.  There has been no fever, back/flank pain, lightheadedness, or significant abdominal pain.  There is no clinical evidence of pyelonephritis, appendicitis,  or diverticulitis.  Not septic. No history suggesting rectal bleeding. The patient will be started on antibiotics for the infection. First dose in the ED. Follow up with primary physician is indicated if not improving in 2-3 days. Return immediately to ER if increasing pain, vomiting, fever, inability to tolerate the oral antibiotic, or for other concerns. Return precautions were discussed with patient. The patient's questions were answered and the patient was agreeable with discharge   Miscellaneous information Follow up with Rio Ospina MD. Schedule an appointment as soon as possible for a visit in 1 week     RN Assessment (Patient s current Symptoms), include time called.  [Insert Left message here if message left]  At 1524 Pt states she has had no blood since Sunday night.  She is now somewhat concerned as to why there was blood in her urine.  RN Recommendations/Instructions per [place] ED lab result protocol  Ok to dc antibiotics, keep f/u appt with Dr. Ospina and if symptoms return be seen sooner.     Please Contact your PCP clinic or return to the Emergency department if your:    Symptoms return.    Symptoms worsen or other concerning symptom's.    PCP follow-up Questions asked: YES       Leah Villa, RN  [facility] RN  Lung Nodule and ED Lab Result F/u RN  Epic pool (ED late result f/u RN): [id number]  FV INCIDENTAL RADIOLOGY F/U NURSES: P [id number]  # [phone]      Copy of Lab result   Exam Information   Exam Date Exam Time Accession # Results    4/15/18  2:43 PM [id number]    Component Results   Component Collected Lab   Specimen Description 04/15/2018  2:43    Catheterized Urine   Special Requests 04/15/2018  2:43 PM 75   Specimen received in  preservative   Culture Micro 04/15/2018  2:43    No growth

## 2018-04-23 ENCOUNTER — OFFICE VISIT (OUTPATIENT)
Dept: INTERNAL MEDICINE | Facility: CLINIC | Age: 83
End: 2018-04-23
Payer: COMMERCIAL

## 2018-04-23 VITALS
WEIGHT: 139 LBS | DIASTOLIC BLOOD PRESSURE: 70 MMHG | HEART RATE: 72 BPM | SYSTOLIC BLOOD PRESSURE: 128 MMHG | BODY MASS INDEX: 29.56 KG/M2 | RESPIRATION RATE: 16 BRPM | TEMPERATURE: 97.6 F

## 2018-04-23 DIAGNOSIS — E87.6 HYPOKALEMIA: ICD-10-CM

## 2018-04-23 DIAGNOSIS — R31.9 HEMATURIA, UNSPECIFIED TYPE: ICD-10-CM

## 2018-04-23 LAB
ALBUMIN UR-MCNC: NEGATIVE MG/DL
APPEARANCE UR: CLEAR
BILIRUB UR QL STRIP: NEGATIVE
COLOR UR AUTO: YELLOW
GLUCOSE UR STRIP-MCNC: NEGATIVE MG/DL
HGB UR QL STRIP: NEGATIVE
KETONES UR STRIP-MCNC: NEGATIVE MG/DL
LEUKOCYTE ESTERASE UR QL STRIP: NEGATIVE
NITRATE UR QL: NEGATIVE
PH UR STRIP: 6.5 PH (ref 5–7)
POTASSIUM SERPL-SCNC: 4.1 MMOL/L (ref 3.4–5.3)
SOURCE: NORMAL
SP GR UR STRIP: 1.01 (ref 1–1.03)
UROBILINOGEN UR STRIP-ACNC: 0.2 EU/DL (ref 0.2–1)

## 2018-04-23 PROCEDURE — 81003 URINALYSIS AUTO W/O SCOPE: CPT | Performed by: INTERNAL MEDICINE

## 2018-04-23 PROCEDURE — 36415 COLL VENOUS BLD VENIPUNCTURE: CPT | Performed by: INTERNAL MEDICINE

## 2018-04-23 PROCEDURE — 99213 OFFICE O/P EST LOW 20 MIN: CPT | Performed by: INTERNAL MEDICINE

## 2018-04-23 PROCEDURE — 84132 ASSAY OF SERUM POTASSIUM: CPT | Performed by: INTERNAL MEDICINE

## 2018-04-23 ASSESSMENT — PAIN SCALES - GENERAL: PAINLEVEL: NO PAIN (0)

## 2018-04-23 NOTE — PROGRESS NOTES
SUBJECTIVE:   Kathy Braswell is a 82 year old female who presents to clinic today for the following health issues:  Chief Complaint   Patient presents with     ED Follow Up         ED/UC Followup:    Facility:  [facility] ED  Date of visit: 04/15/2018  Reason for visit: Acute cystitis with hematuria  Current Status: Stable      Pt's past medical history, family history, habits, medications and allergies were reviewed with the patient today.  See snap shot for  HCM status. Most recent lab results reviewed with pt. Problem list and histories reviewed & adjusted, as indicated.  Additional history as below:    Component      Latest Ref Rng & Units 4/15/2018   Color Urine       Yellow   Appearance Urine       Slightly Cloudy   Glucose Urine      NEG:Negative mg/dL Negative   Bilirubin Urine      NEG:Negative Negative   Ketones Urine      NEG:Negative mg/dL Negative   Specific Gravity Urine      1.003 - 1.035 1.011   Blood Urine      NEG:Negative Moderate (A)   pH Urine      5.0 - 7.0 pH 7.5 (H)   Protein Albumin Urine      NEG:Negative mg/dL 10 (A)   Urobilinogen mg/dL      0.0 - 2.0 mg/dL Normal   Nitrite Urine      NEG:Negative Negative   Leukocyte Esterase Urine      NEG:Negative Large (A)   Source       Catheterized Urine   RBC Urine      0 - 2 / (H)   WBC Urine      0 - 5 /HPF 90 (H)   WBC Clumps      NEG:Negative /HPF Present (A)   Bacteria Urine      NEG:Negative /HPF Few (A)   Mucous Urine      NEG:Negative /LPF Present (A)   Amorphous Crystals      NEG:Negative /HPF Few (A)     Seen in ER 4/15/18. Note reviewed. Had hematuria for 2 days prior to that. Was treated with Keflex but stopped it 4/17 after call from ER that UCx negative.  Has not seen blood in urine since then. No dysuria. No F/C.  Has slight increased frequency  compared to baseline. Hx incontinence that is better than baselien and not using a pad now in underwear. No straining of urine. Had catherization in ER for urine sample.   Potassium lab  "in the ER mildly low.  Was normal 1 month ago.  Patient on hydrochlorothiazide     Additional ROS:   Constitutional, HEENT, Cardiovascular, Pulmonary, GI and , Neuro, MSK and Psych review of systems/symptoms are otherwise negative or unchanged from previous, except as noted above.      OBJECTIVE:  /70  Pulse 72  Temp 97.6  F (36.4  C) (Oral)  Resp 16  Wt 139 lb (63 kg)  BMI 29.56 kg/m2   Estimated body mass index is 29.56 kg/(m^2) as calculated from the following:    Height as of 4/15/18: 4' 9.5\" (1.461 m).    Weight as of this encounter: 139 lb (63 kg).    Pulm: Lungs clear to auscultation   CV: Regular rates and rhythm  GI: Soft, nontender, Normal active bowel sounds, No hepatosplenomegaly or masses palpable  Ext: Peripheral pulses intact. No edema.  Neuro: Normal strength and tone, sensory exam grossly normal    Assessment/Plan: (See plan discussion below for further details)  1. Hematuria, unspecified type  Patient currently asymptomatic after 3 days of Keflex.  Urine culture did not grow out obvious bacteria though ? Something like Ureaplasma present that would not grow well on usual culture. Recheck UA.  If mcrohematuria still present, will pursue further workup of urinary tract  - UA reflex to Microscopic and Culture; Future  - UA reflex to Microscopic and Culture    2. Hypokalemia  Continue current medication.  Recheck lab  - Potassium           Rio Ospina MD  Internal Medicine Department  [facility]          "

## 2018-04-23 NOTE — MR AVS SNAPSHOT
"              After Visit Summary   4/23/2018    Kathy Braswell    MRN: [id number]           Patient Information     Date Of Birth          1935        Visit Information        Provider Department      4/23/2018 2:00 PM Rio Ospina MD [facility]        Today's Diagnoses     Hematuria, unspecified type        Hypokalemia           Follow-ups after your visit        Your next 10 appointments already scheduled     May 09, 2018  2:00 PM CDT   DX HIP[facility]PELVIS/SPINE [facility]   [facility]    [address] [place] [id number] [phone]           Please do not take any of the following 24 hours prior to the day of your exam: vitamins, calcium tablets, antacids.  If possible, please wear clothes without metal (snaps, zippers). A sweatsuit works well.              Who to contact     If you have questions or need follow up information about today's clinic visit or your schedule please contact [facility] directly at [phone].  Normal or non-critical lab and imaging results will be communicated to you by Cellayhart, letter or phone within 4 business days after the clinic has received the results. If you do not hear from us within 7 days, please contact the clinic through [facility] or phone. If you have a critical or abnormal lab result, we will notify you by phone as soon as possible.  Submit refill requests through [employer] or call your pharmacy and they will forward the refill request to us. Please allow 3 business days for your refill to be completed.          Additional Information About Your Visit        Cellayhart Information     Mavent lets you send messages to your doctor, view your test results, renew your prescriptions, schedule appointments and more. To sign up, go to [email or web address] . Click on \"Log in\" on the left side of the screen, which will take you to the Welcome page. Then " "click on \"Sign up Now\" on the right side of the page.     You will be asked to enter the access code listed below, as well as some personal information. Please follow the directions to create your username and password.     Your access code is: [email or web address]  Expires: 2018  4:06 PM     Your access code will  in 90 days. If you need help or a new code, please call your [place] clinic or [phone].        Care EveryWhere ID     This is your Care EveryWhere ID. This could be used by other organizations to access your [facility] medical records  [id number]        Your Vitals Were     Pulse Temperature Respirations BMI (Body Mass Index)          72 97.6  F (36.4  C) (Oral) 16 29.56 kg/m2         Blood Pressure from Last 3 Encounters:   18 128/70   04/15/18 151/71   18 132/68    Weight from Last 3 Encounters:   18 139 lb (63 kg)   04/15/18 135 lb (61.2 kg)   18 133 lb (60.3 kg)              We Performed the Following     Potassium     UA reflex to Microscopic and Culture        Primary Care Provider Office Phone # Fax #    Rio Ospina [phone]       [address] [place] [address]        Equal Access to Services     AMITA GARCIA : Hadii aad ku hadasho Soomaali, waaxda luqadaha, qaybta kaalmada adeegyada, jennifer lima. So [facility] [phone].    ATENCIÓN: Si habla español, tiene a viveros disposición servicios gratuitos de asistencia lingüística. Llame al [phone].    We comply with applicable federal civil rights laws and [place] laws. We do not discriminate on the basis of race, color, national origin, age, disability, sex, sexual orientation, or gender identity.            Thank you!     Thank you for choosing [facility]  for your care. Our goal is always to provide you with excellent care. Hearing back from our patients is one way we can continue to improve our services. Please take a few minutes to " complete the written survey that you may receive in the mail after your visit with us. Thank you!             Your Updated Medication List - Protect others around you: Learn how to safely use, store and throw away your medicines at www.disposemymeds.org.          This list is accurate as of 4/23/18  8:43 PM.  Always use your most recent med list.                   Brand Name Dispense Instructions for use Diagnosis    alendronate 70 MG tablet    FOSAMAX    12 tablet    Take 1 tablet (70 mg) by mouth every 7 days Take with over 8 ounces water and stay upright for at least 30 minutes after dose.  Take at least 60 minutes before breakfast    Osteopenia, unspecified location       busPIRone 15 MG tablet    BUSPAR    90 tablet    1 tab daily    Anxiety       cholecalciferol 1000 UNIT tablet    vitamin D3     Take 1 tablet by mouth daily.        FLUoxetine 20 MG capsule    PROzac    90 capsule    Take 1 capsule (20 mg) by mouth daily    Anxiety       fluticasone 50 MCG/ACT spray    FLONASE    1 Package    Spray 1-2 sprays into both nostrils daily    Nasal congestion       irbesartan-hydrochlorothiazide 150-12.5 MG per tablet    AVALIDE    90 tablet    Take 1 tablet by mouth daily    Essential hypertension, benign       LORazepam 0.5 MG tablet    ATIVAN    30 tablet    TAKE 1 TABLET BY MOUTH EVERY DAY AS NEEDED    Anxiety       order for DME     1 each    BP cuff, brand as covered by insurance.  Dx: HTN    Essential hypertension, benign       TYLENOL 325 MG tablet   Generic drug:  acetaminophen      Take 1-2 tablets (325-650 mg) by mouth every 6 hours as needed for mild pain        zolpidem 6.25 MG CR tablet    AMBIEN CR    30 tablet    Take 1 tablet (6.25 mg) by mouth nightly as needed for sleep    Insomnia, unspecified

## 2018-04-26 ENCOUNTER — TELEPHONE (OUTPATIENT)
Dept: INTERNAL MEDICINE | Facility: CLINIC | Age: 83
End: 2018-04-26

## 2018-04-26 NOTE — TELEPHONE ENCOUNTER
Potassium back to normal. I am not aware that pt has been taking potassium supplement. None prescribed by ER and I did not prescribe potassium tab for pt. Potassium had been normal prior to ER and likely got low due to recent illness.   Unless was taking potassium prior to recent ER visit, OK to stop any potassium supplement  Pt ? Is taking now since ER visit.  Urine is normal now following taking a shorttened course of abx. UCx did not grow anything but ? Ureaplasma bacterial infection that would not have shown up on usual urine culture but which would have been sensitive to abx pt took.  No need for Urologist appt  Continue other medications as normal

## 2018-04-26 NOTE — TELEPHONE ENCOUNTER
Requesting advisement on labs from 4/23.  Patient wanting to know if she needs to continue taking potassium?  Also, would she benefit from seeing a urologist?

## 2018-04-26 NOTE — TELEPHONE ENCOUNTER
Reason for Call:  Other call back    Detailed comments: Results of her lab tests from 4/23/18    Phone Number Patient can be reached at: Home number on file [phone] (home)    Best Time: As soon as possible - she is very concerned.    Can we leave a detailed message on this number? NO - she would like to talk to Dr. Ospina or his nurse.    Call taken on 4/26/2018 at 3:36 PM by Sarahy Rosales

## 2018-04-27 NOTE — TELEPHONE ENCOUNTER
Called Kathy and got her VM. Left a detailed message (CTC) regarding the message below.     PERRY Crum ([facility])

## 2018-05-06 ENCOUNTER — TRANSFERRED RECORDS (OUTPATIENT)
Dept: HEALTH INFORMATION MANAGEMENT | Facility: CLINIC | Age: 83
End: 2018-05-06

## 2018-05-09 DIAGNOSIS — F41.9 ANXIETY: ICD-10-CM

## 2018-05-09 RX ORDER — LORAZEPAM 0.5 MG/1
TABLET ORAL
Qty: 30 TABLET | Refills: 3 | Status: SHIPPED | OUTPATIENT
Start: 2018-05-09 | End: 2018-09-11

## 2018-05-09 NOTE — TELEPHONE ENCOUNTER
Timing of refill appropriate and clinic appt UTD. Printed Rx in [place] basket. Please fax to pt's pharmacy or call into pharmacy

## 2018-05-09 NOTE — TELEPHONE ENCOUNTER
Controlled Substance Refill Request for LORazepam (ATIVAN) 0.5 MG tablet  Problem List Complete:  No     PROVIDER TO CONSIDER COMPLETION OF PROBLEM LIST AND OVERVIEW/CONTROLLED SUBSTANCE AGREEMENT    Last Written Prescription Date:  2/28/18  Last Fill Quantity: 30,   # refills: 1    Last Office Visit with [facility] primary care provider: 4/23/18    Future Office visit:     Controlled substance agreement on file: No.     Processing:  Fax Rx to [facility]  pharmacy   checked in past 6 months?  No, route to RN

## 2018-05-14 ENCOUNTER — TRANSFERRED RECORDS (OUTPATIENT)
Dept: HEALTH INFORMATION MANAGEMENT | Facility: CLINIC | Age: 83
End: 2018-05-14

## 2018-07-25 ENCOUNTER — TRANSFERRED RECORDS (OUTPATIENT)
Dept: HEALTH INFORMATION MANAGEMENT | Facility: CLINIC | Age: 83
End: 2018-07-25

## 2018-08-01 DIAGNOSIS — G47.00 INSOMNIA, UNSPECIFIED TYPE: Primary | ICD-10-CM

## 2018-08-01 RX ORDER — ZOLPIDEM TARTRATE 6.25 MG/1
TABLET, FILM COATED, EXTENDED RELEASE ORAL
Qty: 30 TABLET | Refills: 1 | Status: SHIPPED | OUTPATIENT
Start: 2018-08-01 | End: 2018-10-25

## 2018-08-01 NOTE — TELEPHONE ENCOUNTER
Requested Prescriptions   Pending Prescriptions Disp Refills     zolpidem (AMBIEN CR) 6.25 MG CR tablet [Pharmacy Med Name: ZOLPIDEM ER 6.25MG TABLETS] 30 tablet 0     Sig: TAKE 1 TABLET BY MOUTH NIGHTLY AS NEEDED FOR SLEEP    There is no refill protocol information for this order        Last Written Prescription Date:  7/14/17  Last Fill Quantity: 30,  # refills: 3   Last office visit: 4/23/2018 with prescribing provider:  4/23/18   Future Office Visit:

## 2018-08-01 NOTE — TELEPHONE ENCOUNTER
Has used #120 tabs in 1 year. Timing of refill appropriate.  Printed Rx in [place] basket. Please fax to pharmacy or call in Rx

## 2018-08-24 ENCOUNTER — RADIANT APPOINTMENT (OUTPATIENT)
Dept: BONE DENSITY | Facility: CLINIC | Age: 83
End: 2018-08-24
Attending: INTERNAL MEDICINE
Payer: COMMERCIAL

## 2018-08-24 ENCOUNTER — RADIANT APPOINTMENT (OUTPATIENT)
Dept: MAMMOGRAPHY | Facility: CLINIC | Age: 83
End: 2018-08-24
Attending: INTERNAL MEDICINE
Payer: COMMERCIAL

## 2018-08-24 DIAGNOSIS — M85.80 OSTEOPENIA, UNSPECIFIED LOCATION: ICD-10-CM

## 2018-08-24 DIAGNOSIS — Z12.31 VISIT FOR SCREENING MAMMOGRAM: ICD-10-CM

## 2018-08-24 PROCEDURE — 77067 SCR MAMMO BI INCL CAD: CPT | Mod: TC

## 2018-08-24 PROCEDURE — 77063 BREAST TOMOSYNTHESIS BI: CPT | Mod: TC

## 2018-08-24 PROCEDURE — 77085 DXA BONE DENSITY AXL VRT FX: CPT | Performed by: INTERNAL MEDICINE

## 2018-09-07 DIAGNOSIS — F41.9 ANXIETY: ICD-10-CM

## 2018-09-07 NOTE — TELEPHONE ENCOUNTER
Requested Prescriptions   Pending Prescriptions Disp Refills     LORazepam (ATIVAN) 1 MG tablet [Pharmacy Med Name: LORAZEPAM 1MG  Last Written Prescription Date:  05/09/2018  Last Fill Quantity: 30,  # refills: 03   Last Office Visit: 4/23/2018   Future Office Visit:      TABLETS] 15 tablet 0     Sig: TAKE 1/2 TABLET BY MOUTH EVERY DAY AS NEEDED    There is no refill protocol information for this order

## 2018-09-11 RX ORDER — LORAZEPAM 1 MG/1
TABLET ORAL
Qty: 15 TABLET | Refills: 0 | OUTPATIENT
Start: 2018-09-11

## 2018-09-11 RX ORDER — LORAZEPAM 0.5 MG/1
TABLET ORAL
Qty: 30 TABLET | Refills: 0 | Status: SHIPPED | OUTPATIENT
Start: 2018-09-11 | End: 2018-10-09

## 2018-09-11 NOTE — TELEPHONE ENCOUNTER
Patient's last prescription was for Lorazepam 0.5 mg tablet, 1 tablet daily as needed for anxiety.  Unclear why receiving a refill request now for 1 mg tablets to cut in half to equal same dose.  Patient last seen 5 months ago.  Needs follow-up every 6 months with controlled substance anxiety medication.  Prescription done for #30 tablets of the 0.5 mg.  Patient to follow-up with me in clinic in the next month regarding anxiety control.  Paper prescription in [place] basket.  Please fax this to patient's pharmacy and inform patient of need for follow-up and assist in scheduling appointment

## 2018-09-21 ENCOUNTER — OFFICE VISIT (OUTPATIENT)
Dept: INTERNAL MEDICINE | Facility: CLINIC | Age: 83
End: 2018-09-21
Payer: COMMERCIAL

## 2018-09-21 VITALS
DIASTOLIC BLOOD PRESSURE: 76 MMHG | RESPIRATION RATE: 14 BRPM | OXYGEN SATURATION: 97 % | SYSTOLIC BLOOD PRESSURE: 144 MMHG | WEIGHT: 131 LBS | HEART RATE: 75 BPM | TEMPERATURE: 98.4 F | BODY MASS INDEX: 27.86 KG/M2

## 2018-09-21 DIAGNOSIS — H69.91 DYSFUNCTION OF RIGHT EUSTACHIAN TUBE: Primary | ICD-10-CM

## 2018-09-21 PROCEDURE — 99214 OFFICE O/P EST MOD 30 MIN: CPT | Performed by: PHYSICIAN ASSISTANT

## 2018-09-21 RX ORDER — FLUTICASONE PROPIONATE 50 MCG
1-2 SPRAY, SUSPENSION (ML) NASAL DAILY
Qty: 1 BOTTLE | Refills: 11 | Status: SHIPPED | OUTPATIENT
Start: 2018-09-21 | End: 2023-02-21

## 2018-09-21 NOTE — PROGRESS NOTES
SUBJECTIVE:   Kathy Braswell is a 83 year old female who presents to clinic today for the following health issues:      Acute Illness   Acute illness concerns: Watery ear  Onset: Some symptoms a month ago, ears 2 days ago    Fever: no    Chills/Sweats: no    Headache (location?): YES- slight    Sinus Pressure:no    Conjunctivitis:  no    Ear Pain: NO: right sloshing feeling watery    Rhinorrhea: no    Congestion: no    Sore Throat: no     Cough: no    Wheeze: no    Decreased Appetite: no    Nausea: YES    Vomiting: no    Diarrhea:  no    Dysuria/Freq.: no    Fatigue/Achiness: No    Sick/Strep Exposure: no     Therapies Tried and outcome: tylenol, helpful for the minor headaches  Hx of allergies- seasonal fall  Has not been taking any medications for it     -------------------------------------    Problem list and histories reviewed & adjusted, as indicated.  Additional history: as documented    Labs reviewed in EPIC    Reviewed and updated as needed this visit by clinical staff       Reviewed and updated as needed this visit by Provider  Allergies  Meds         ROS:  Constitutional, HEENT, cardiovascular, pulmonary, gi Neuro systems are negative, except as otherwise noted.    OBJECTIVE:     /76 (BP Location: Left arm, Patient Position: Chair, Cuff Size: Adult Regular)  Pulse 75  Temp 98.4  F (36.9  C) (Oral)  Resp 14  Wt 131 lb (59.4 kg)  SpO2 97%  BMI 27.86 kg/m2  Body mass index is 27.86 kg/(m^2).  GENERAL: healthy, alert and no distress  HENT: normal cephalic/atraumatic, right ear: clear effusion, left ear: normal: no effusions, no erythema, normal landmarks, nose and mouth without ulcers or lesions, rhinorrhea clear, oropharynx clear and oral mucous membranes moist  NECK: no adenopathy, no asymmetry, masses, or scars and thyroid normal to palpation  RESP: lungs clear to auscultation - no rales, rhonchi or wheezes  CV: regular rates and rhythm and normal S1 S2, no S3 or S4  SKIN: no suspicious  lesions or rashes    Diagnostic Test Results:  none     ASSESSMENT/PLAN:             1. Dysfunction of right eustachian tube    - fluticasone (FLONASE) 50 MCG/ACT spray; Spray 1-2 sprays into both nostrils daily  Dispense: 1 Bottle; Refill: 11    Reviewed patient instructions  Discussed to take Claritin daily for allergies  Steroid nasal spray  Patient scheduled with PCP in 2 weeks recheck as needed then    25 minutes spent with patient > 50% of time on counseling and plan of care.      Julianna Hansen PA-C  [facility]

## 2018-09-21 NOTE — PATIENT INSTRUCTIONS
some Claritin to help allergies. Over the counter      Earache, No Infection (Adult)      Earaches can happen without an infection. This occurs when air and fluid build up behind the eardrum causing a feeling of fullness and discomfort and reduced hearing. This is called otitis media with effusion (OME) or serous otitis media. It means there is fluid in the middle ear. It is not the same as acute otitis media, which is typically from infection.  OME can happen when you have a cold if congestion blocks the passage that drains the middle ear. This passage is called the eustachian tube. OME may also occur with nasal allergies or after a bacterial middle ear infection.    The pain or discomfort may come and go. You may hear clicking or popping sounds when you chew or swallow. You may feel that your balance is off. Or you may hear ringing in the ear.  It often takes from several weeks up to 3 months for the fluid to clear on its own. Oral pain relievers and ear drops help if there is pain. Decongestants and antihistamines sometimes help. Antibiotics don't help since there is no infection. Your doctor may prescribe a nasal spray to help reduce swelling in the nose and eustachian tube. This can allow the ear to drain.  If your OME doesn't improve after 3 months, surgery may be used to drain the fluid and insert a small tube in the eardrum to allow continued drainage.  Because the middle ear fluid can become infected, it is important to watch for signs of an ear infection which may develop later. These signs include increased ear pain, fever, or drainage from the ear.  Home care  The following guidelines will help you care for yourself at home:    You may use over-the-counter medicine as directed to control pain, unless another medicine was prescribed. If you have chronic liver or kidney disease or ever had a stomach ulcer or GI bleeding, talk with your doctor before using these medicines. Aspirin should never be  used in anyone under 18 years of age who is ill with a fever. It may cause severe liver damage.    You may use over-the-counter decongestants such as phenylephrine or pseudoephedrine. But they are not always helpful. Don't use nasal spray decongestants more than 3 days. Longer use can make congestion worse. Prescription nasal sprays from your doctor don't typically have those restrictions.    Antihistamines may help if you are also having allergy symptoms.    You may use medicines such as guaifenesin to thin mucus and promote drainage.  Follow-up care  Follow up with your healthcare provider or as advised if you are not feeling better after 3 days.  When to seek medical advice  Call your healthcare provider right away if any of the following occur:    Your ear pain gets worse or does not start to improve     Fever of 100.4 F (38 C) or higher, or as directed by your healthcare provider    Fluid or blood draining from the ear    Headache or sinus pain    Stiff neck    Unusual drowsiness or confusion  Date Last Reviewed: 10/1/2016    2955-5012 [employer]. [address] [place] [address], [place] [address]. All rights reserved. This information is not intended as a substitute for professional medical care. Always follow your healthcare professional's instructions.

## 2018-09-21 NOTE — MR AVS SNAPSHOT
After Visit Summary   9/21/2018    Kathy Braswell    MRN: [id number]           Patient Information     Date Of Birth          1935        Visit Information        Provider Department      9/21/2018 1:40 PM Julianna Hansen PA-C [facility]        Today's Diagnoses     Dysfunction of right eustachian tube    -  1      Care Instructions     some Claritin to help allergies. Over the counter      Earache, No Infection (Adult)      Earaches can happen without an infection. This occurs when air and fluid build up behind the eardrum causing a feeling of fullness and discomfort and reduced hearing. This is called otitis media with effusion (OME) or serous otitis media. It means there is fluid in the middle ear. It is not the same as acute otitis media, which is typically from infection.  OME can happen when you have a cold if congestion blocks the passage that drains the middle ear. This passage is called the eustachian tube. OME may also occur with nasal allergies or after a bacterial middle ear infection.    The pain or discomfort may come and go. You may hear clicking or popping sounds when you chew or swallow. You may feel that your balance is off. Or you may hear ringing in the ear.  It often takes from several weeks up to 3 months for the fluid to clear on its own. Oral pain relievers and ear drops help if there is pain. Decongestants and antihistamines sometimes help. Antibiotics don't help since there is no infection. Your doctor may prescribe a nasal spray to help reduce swelling in the nose and eustachian tube. This can allow the ear to drain.  If your OME doesn't improve after 3 months, surgery may be used to drain the fluid and insert a small tube in the eardrum to allow continued drainage.  Because the middle ear fluid can become infected, it is important to watch for signs of an ear infection which may develop later. These signs include increased ear pain,  fever, or drainage from the ear.  Home care  The following guidelines will help you care for yourself at home:    You may use over-the-counter medicine as directed to control pain, unless another medicine was prescribed. If you have chronic liver or kidney disease or ever had a stomach ulcer or GI bleeding, talk with your doctor before using these medicines. Aspirin should never be used in anyone under 18 years of age who is ill with a fever. It may cause severe liver damage.    You may use over-the-counter decongestants such as phenylephrine or pseudoephedrine. But they are not always helpful. Don't use nasal spray decongestants more than 3 days. Longer use can make congestion worse. Prescription nasal sprays from your doctor don't typically have those restrictions.    Antihistamines may help if you are also having allergy symptoms.    You may use medicines such as guaifenesin to thin mucus and promote drainage.  Follow-up care  Follow up with your healthcare provider or as advised if you are not feeling better after 3 days.  When to seek medical advice  Call your healthcare provider right away if any of the following occur:    Your ear pain gets worse or does not start to improve     Fever of 100.4 F (38 C) or higher, or as directed by your healthcare provider    Fluid or blood draining from the ear    Headache or sinus pain    Stiff neck    Unusual drowsiness or confusion  Date Last Reviewed: 10/1/2016    [id number] [employer]. [address] [place] [address]. All rights reserved. This information is not intended as a substitute for professional medical care. Always follow your healthcare professional's instructions.                Follow-ups after your visit        Your next 10 appointments already scheduled     Oct 09, 2018  8:00 AM CDT   PHYSICAL with Rio Ospina MD   [facility] ([facility]    [address]  "[phone]              Who to contact     If you have questions or need follow up information about today's clinic visit or your schedule please contact [facility] directly at [phone].  Normal or non-critical lab and imaging results will be communicated to you by MyChart, letter or phone within 4 business days after the clinic has received the results. If you do not hear from us within 7 days, please contact the clinic through MyChart or phone. If you have a critical or abnormal lab result, we will notify you by phone as soon as possible.  Submit refill requests through [employer] or call your pharmacy and they will forward the refill request to us. Please allow 3 business days for your refill to be completed.          Additional Information About Your Visit        [employer] Information     [employer] lets you send messages to your doctor, view your test results, renew your prescriptions, schedule appointments and more. To sign up, go to [email or web address] . Click on \"Log in\" on the left side of the screen, which will take you to the Welcome page. Then click on \"Sign up Now\" on the right side of the page.     You will be asked to enter the access code listed below, as well as some personal information. Please follow the directions to create your username and password.     Your access code is: [email or web address]  Expires: 2018  2:01 PM     Your access code will  in 90 days. If you need help or a new code, please call your [facility] clinic or [phone].        Care EveryWhere ID     This is your Care EveryWhere ID. This could be used by other organizations to access your [facility] medical records  [id number]        Your Vitals Were     Pulse Temperature Respirations Pulse Oximetry BMI (Body Mass Index)       75 98.4  F (36.9  C) (Oral) 14 97% 27.86 kg/m2        Blood Pressure from Last 3 Encounters:   18 144/76   18 128/70   04/15/18 151/71    Weight from " Last 3 Encounters:   09/21/18 131 lb (59.4 kg)   04/23/18 139 lb (63 kg)   04/15/18 135 lb (61.2 kg)              Today, you had the following     No orders found for display         Today's Medication Changes          These changes are accurate as of 9/21/18  2:01 PM.  If you have any questions, ask your nurse or doctor.               These medicines have changed or have updated prescriptions.        Dose/Directions    * fluticasone 50 MCG/ACT spray   Commonly known as:  FLONASE   This may have changed:  Another medication with the same name was added. Make sure you understand how and when to take each.   Used for:  Nasal congestion   Changed by:  Julianna Hansen PA-C        Dose:  1-2 spray   Spray 1-2 sprays into both nostrils daily   Quantity:  1 Package   Refills:  1       * fluticasone 50 MCG/ACT spray   Commonly known as:  FLONASE   This may have changed:  You were already taking a medication with the same name, and this prescription was added. Make sure you understand how and when to take each.   Used for:  Dysfunction of right eustachian tube   Changed by:  Julianna Hansen PA-C        Dose:  1-2 spray   Spray 1-2 sprays into both nostrils daily   Quantity:  1 Bottle   Refills:  11       * Notice:  This list has 2 medication(s) that are the same as other medications prescribed for you. Read the directions carefully, and ask your doctor or other care provider to review them with you.         Where to get your medicines      These medications were sent to [facility] [address] [place] [address] & [address], [place] [address]     Phone:  [phone]     fluticasone 50 MCG/ACT spray                Primary Care Provider Office Phone # Fax #    Rio Ospina [phone]       [address] [address]        Equal Access to Services     AMITA GARCIA AH: Alma Madden, damian torres, emily emerson  jennifer ellsworthmarivel andresaan ah. So [facility] [phone].    ATENCIÓN: Si rachella ealr, tiene a viveros disposición servicios gratuitos de asistencia lingüística. Juani al [phone].    We comply with applicable federal civil rights laws and [place] laws. We do not discriminate on the basis of race, color, national origin, age, disability, sex, sexual orientation, or gender identity.            Thank you!     Thank you for choosing [facility]  for your care. Our goal is always to provide you with excellent care. Hearing back from our patients is one way we can continue to improve our services. Please take a few minutes to complete the written survey that you may receive in the mail after your visit with us. Thank you!             Your Updated Medication List - Protect others around you: Learn how to safely use, store and throw away your medicines at www.disposemymeds.org.          This list is accurate as of 9/21/18  2:01 PM.  Always use your most recent med list.                   Brand Name Dispense Instructions for use Diagnosis    alendronate 70 MG tablet    FOSAMAX    12 tablet    Take 1 tablet (70 mg) by mouth every 7 days Take with over 8 ounces water and stay upright for at least 30 minutes after dose.  Take at least 60 minutes before breakfast    Osteopenia, unspecified location       busPIRone 15 MG tablet    BUSPAR    90 tablet    1 tab daily    Anxiety       cholecalciferol 1000 UNIT tablet    vitamin D3     Take 1 tablet by mouth daily.        FLUoxetine 20 MG capsule    PROzac    90 capsule    Take 1 capsule (20 mg) by mouth daily    Anxiety       * fluticasone 50 MCG/ACT spray    FLONASE    1 Package    Spray 1-2 sprays into both nostrils daily    Nasal congestion       * fluticasone 50 MCG/ACT spray    FLONASE    1 Bottle    Spray 1-2 sprays into both nostrils daily    Dysfunction of right eustachian tube       irbesartan-hydrochlorothiazide 150-12.5 MG per  tablet    AVALIDE    90 tablet    Take 1 tablet by mouth daily    Essential hypertension, benign       LORazepam 0.5 MG tablet    ATIVAN    30 tablet    TAKE 1 TABLET BY MOUTH EVERY DAY AS NEEDED    Anxiety       order for DME     1 each    BP cuff, brand as covered by insurance.  Dx: HTN    Essential hypertension, benign       TYLENOL 325 MG tablet   Generic drug:  acetaminophen      Take 1-2 tablets (325-650 mg) by mouth every 6 hours as needed for mild pain        zolpidem 6.25 MG CR tablet    AMBIEN CR    30 tablet    TAKE 1 TABLET BY MOUTH NIGHTLY AS NEEDED FOR SLEEP    Insomnia, unspecified type       * Notice:  This list has 2 medication(s) that are the same as other medications prescribed for you. Read the directions carefully, and ask your doctor or other care provider to review them with you.

## 2018-10-01 ENCOUNTER — TRANSFERRED RECORDS (OUTPATIENT)
Dept: HEALTH INFORMATION MANAGEMENT | Facility: CLINIC | Age: 83
End: 2018-10-01

## 2018-10-08 NOTE — PROGRESS NOTES
SUBJECTIVE:   Kathy Braswell is a 83 year old female who presents for Preventive Visit.      Are you in the first 12 months of your Medicare Part B coverage?  No    Healthy Habits:    Do you get at least three servings of calcium containing foods daily (dairy, green leafy vegetables, etc.)? yes    Amount of exercise or daily activities, outside of work: none    Problems taking medications regularly No    Medication side effects: No    Have you had an eye exam in the past two years? yes    Do you see a dentist twice per year? yes    Do you have sleep apnea, excessive snoring or daytime drowsiness?no  Question 1.  In the last 12 months: We worried food would run out before we had money to buy more. Never True    Question 2.  In the last 12 months: The food we bought just didn't last and we didn't have money to buy more. Never True    Did the patient answer Sometimes True or Often True to EITHER Question 1 or Question 2? No        Ability to successfully perform activities of daily living: Yes, no assistance needed    Home safety:  none identified     Hearing impairment: No    Fall risk:  Fallen 2 or more times in the past year?: No  Any fall with injury in the past year?: No        COGNITIVE SCREEN  1) Repeat 3 items (Leader, Season, Table)    2) Clock draw:  NORMAL  3) 3 item recall: Recalls 2 object   Results: NORMAL clock, 1-2 items recalled:  COGNITIVE IMPAIRMENT UNLIKELY    Mini-CogTM Copyright MARISA Fernandes. Licensed by the author for use in [facility]; reprinted with permission ([email or web address]@.[email or web address]). All rights reserved.          Reviewed and updated as needed this visit by clinical staff         Reviewed and updated as needed this visit by Provider        Social History   Substance Use Topics     Smoking status: Never Smoker     Smokeless tobacco: Never Used     Alcohol use 0.0 oz/week     0 Standard drinks or equivalent per week      Comment: seldom       If you drink alcohol do you typically have >3  drinks per day or >7 drinks per week? No                        Today's PHQ-2 Score:   PHQ-2 ( 1999 [employer]) 3/8/2018 7/14/2017   Q1: Little interest or pleasure in doing things 0 0   Q2: Feeling down, depressed or hopeless 0 1   PHQ-2 Score 0 1       Do you feel safe in your environment - Yes    Do you have a Health Care Directive?: No: Advance care planning was reviewed with patient; patient given infomration.    Current providers sharing in care for this patient include:   Patient Care Team:  Rio Ospina MD as PCP - General    The following health maintenance items are reviewed in Epic and correct as of today:  Health Maintenance   Topic Date Due     ADVANCE DIRECTIVE PLANNING Q5 YRS  06/13/2016     FALL RISK ASSESSMENT  07/14/2018     INFLUENZA VACCINE (1) 09/01/2018     PHQ-9 Q6 MONTHS  09/08/2018     DEPRESSION ACTION PLAN Q1 YR  03/08/2019     TETANUS IMMUNIZATION (SYSTEM ASSIGNED)  10/05/2026     DEXA SCAN SCREENING (SYSTEM ASSIGNED)  Completed     PNEUMOCOCCAL  Completed     Labs reviewed in EPIC        ROS:  CONSTITUTIONAL: NEGATIVE for fever, chills. Weight down 9 pounds with diet. Has some general fatigue for 3 mos. Nap for 30 min daily. More refreshed after. No known snoring but lives by herself  INTEGUMENTARY/SKIN: NEGATIVE for worrisome rashes, moles or lesions  EYES: NEGATIVE for vision changes or irritation. Has glasses. Eye exam 1 year ago and seen every 2 years  ENT/MOUTH: NEGATIVE for ear, mouth and throat problems  RESP: NEGATIVE for significant cough or SOB  BREAST: NEGATIVE for masses, tenderness or discharge  CV: NEGATIVE for chest pain, palpitations or peripheral edema. BP controlled with meds  GI: NEGATIVE for nausea, abdominal pain, heartburn, or change in bowel habits  : NEGATIVE for frequency, dysuria, or hematuria. NO vaginal sx  MUSCULOSKELETAL: NEGATIVE for significant arthralgias or myalgia that stop activity except for right knee pain. Had cortisone injection 2 weeks ago and much  "better. Mild stiffness  NEURO: NEGATIVE for weakness, dizziness or paresthesias  ENDOCRINE: NEGATIVE for temperature intolerance   HEME: NEGATIVE for bleeding problems  PSYCHIATRIC:  POSITIVE for anxiety. Using Lorazepam once a day in addition to Fluoxetine and Buspar. Has not tolerated higher doses of those 2 meds. Using Zolpidem about once a week only    OBJECTIVE:   /70  Pulse 80  Temp 97.8  F (36.6  C) (Oral)  Resp 16  Wt 130 lb (59 kg)  SpO2 95%  BMI 27.64 kg/m2 Estimated body mass index is 27.86 kg/(m^2) as calculated from the following:    Height as of 4/15/18: 4' 9.5\" (1.461 m).    Weight as of 9/21/18: 131 lb (59.4 kg).  EXAM:   General appearance -   alert, no distress. Calm  affect currently.   Skin - No rashes or lesions.  Head - normocephalic, atraumatic  Eyes - VALENTE, EOMI, fundi exam with nondilated pupils negative.  Ears - External ears normal. Canals clear. TM's normal.  Nose/Sinuses - Nares normal. Septum midline. Mucosa normal. No drainage or sinus tenderness.  Oropharynx - No erythema, no adenopathy, no exudates.  Mild posterior pharyngeal airway narrowing with body habitus  Neck - Supple without adenopathy or thyromegaly. No bruits.  Lungs - Clear to auscultation without wheezes/rhonchi.  Heart - Regular rate and rhythm without murmurs, clicks, or gallops.  Nodes - No supraclavicular, axillary, or inguinal adenopathy palpable.  Breasts - deferred  Abdomen - Abdomen soft, non-tender. BS normal. No masses or hepatosplenomegaly palpable. No bruits.  Extremities -No cyanosis, clubbing or edema.    Musculoskeletal - Spine ROM normal. Muscular strength intact.   Peripheral pulses - radial=4/4, femoral=4/4, posterior tibial=4/4, dorsalis pedis=4/4,  Neuro - Gait normal. Reflexes normal and symmetric. Sensation grossly WNL.  Genital/Rectal - deferred        ASSESSMENT / PLAN:   1. Medicare annual wellness visit, subsequent   See below for HCM. Otherwise UTD    2. Anxiety   JUANPABLO = 1.  Continue " "current medication.  Symptoms uncontrolled previously when just using buspirone and fluoxetine and patient did not tolerate higher dose trials of those 2 meds  - LORazepam (ATIVAN) 0.5 MG tablet; TAKE 1 TABLET BY MOUTH EVERY DAY AS NEEDED  Dispense: 90 tablet; Refill: 1    3. Other fatigue  Possible sleep apnea.  Patient has fatigue issues prior to taking Lorazepam tablet so unlikely to be the cause.  Will check labs as ordered below.  Previous cortisol levels normal.  Offered the patient a sleep clinic evaluation but declines at this time  - Comprehensive metabolic panel  - TSH with free T4 reflex  - CBC with platelets    4. Need for prophylactic vaccination and inoculation against influenza  - FLU VACCINE, INCREASED ANTIGEN, PRESV FREE, AGE 65+ [22996]  - ADMIN INFLUENZA (For MEDICARE Patients ONLY) []      End of Life Planning:  Patient currently has an advanced directive: No.  I have verified the patient's ablity to prepare an advanced directive/make health care decisions.  Literature was provided to assist patient in preparing an advanced directive.    COUNSELING:  Reviewed preventive health counseling, as reflected in patient instructions    BP Readings from Last 1 Encounters:   09/21/18 144/76     Estimated body mass index is 27.86 kg/(m^2) as calculated from the following:    Height as of 4/15/18: 4' 9.5\" (1.461 m).    Weight as of 9/21/18: 131 lb (59.4 kg).      Weight management plan: Discussed healthy diet and exercise guidelines and patient will follow up in 12 months in clinic to re-evaluate.     reports that she has never smoked. She has never used smokeless tobacco.      Appropriate preventive services were discussed with this patient, including applicable screening as appropriate for cardiovascular disease, diabetes, osteopenia/osteoporosis, and glaucoma.  As appropriate for age/gender, discussed screening for colorectal cancer, prostate cancer, breast cancer, and cervical cancer. Checklist " reviewing preventive services available has been given to the patient.    Reviewed patients plan of care and provided an AVS. The Basic Care Plan (routine screening as documented in Health Maintenance) for Kathy meets the Care Plan requirement. This Care Plan has been established and reviewed with the Patient.    Counseling Resources:  ATP IV Guidelines  Pooled Cohorts Equation Calculator  Breast Cancer Risk Calculator  FRAX Risk Assessment  ICSI Preventive Guidelines  Dietary Guidelines for Americans, 2010  Parko's MyPlate  ASA Prophylaxis  Lung CA Screening      PLAN:  Labs as ordered  Restart CURVES exercise  Flu vaccine   Labs for fatigue symptoms   Check with insurance or speak with your pharmacist re: Shingrix vaccine coverage for shingles prevention.  This is a 2 shot series done 2-6 months apart  Calorie/carbohydrate (sugar, bread, potato, pasta, etc) reduction in diet.  Increase color on your plate with fruits and vegetables. Increase  frequency of walking or other aerobic exercise as able (goal is daily)              Rio Ospina MD  [facility]

## 2018-10-08 NOTE — PATIENT INSTRUCTIONS
Labs as ordered  Restart CURVES exercise  Flu vaccine   Labs for fatigue symptoms   Check with insurance or speak with your pharmacist re: Shingrix vaccine coverage for shingles prevention.  This is a 2 shot series done 2-6 months apart  Calorie/carbohydrate (sugar, bread, potato, pasta, etc) reduction in diet.  Increase color on your plate with fruits and vegetables. Increase  frequency of walking or other aerobic exercise as able (goal is daily)

## 2018-10-09 ENCOUNTER — OFFICE VISIT (OUTPATIENT)
Dept: INTERNAL MEDICINE | Facility: CLINIC | Age: 83
End: 2018-10-09
Payer: COMMERCIAL

## 2018-10-09 VITALS
SYSTOLIC BLOOD PRESSURE: 132 MMHG | RESPIRATION RATE: 16 BRPM | DIASTOLIC BLOOD PRESSURE: 70 MMHG | TEMPERATURE: 97.8 F | BODY MASS INDEX: 27.64 KG/M2 | OXYGEN SATURATION: 95 % | HEART RATE: 80 BPM | WEIGHT: 130 LBS

## 2018-10-09 DIAGNOSIS — F41.9 ANXIETY: ICD-10-CM

## 2018-10-09 DIAGNOSIS — R53.83 OTHER FATIGUE: ICD-10-CM

## 2018-10-09 DIAGNOSIS — Z00.00 MEDICARE ANNUAL WELLNESS VISIT, SUBSEQUENT: Primary | ICD-10-CM

## 2018-10-09 DIAGNOSIS — Z23 NEED FOR PROPHYLACTIC VACCINATION AND INOCULATION AGAINST INFLUENZA: ICD-10-CM

## 2018-10-09 LAB
ALBUMIN SERPL-MCNC: 4.1 G/DL (ref 3.4–5)
ALP SERPL-CCNC: 53 U/L (ref 40–150)
ALT SERPL W P-5'-P-CCNC: 24 U/L (ref 0–50)
ANION GAP SERPL CALCULATED.3IONS-SCNC: 9 MMOL/L (ref 3–14)
AST SERPL W P-5'-P-CCNC: 13 U/L (ref 0–45)
BILIRUB SERPL-MCNC: 0.7 MG/DL (ref 0.2–1.3)
BUN SERPL-MCNC: 14 MG/DL (ref 7–30)
CALCIUM SERPL-MCNC: 9.5 MG/DL (ref 8.5–10.1)
CHLORIDE SERPL-SCNC: 98 MMOL/L (ref 94–109)
CO2 SERPL-SCNC: 27 MMOL/L (ref 20–32)
CREAT SERPL-MCNC: 0.64 MG/DL (ref 0.52–1.04)
ERYTHROCYTE [DISTWIDTH] IN BLOOD BY AUTOMATED COUNT: 13.8 % (ref 10–15)
GFR SERPL CREATININE-BSD FRML MDRD: 89 ML/MIN/1.7M2
GLUCOSE SERPL-MCNC: 98 MG/DL (ref 70–99)
HCT VFR BLD AUTO: 43.6 % (ref 35–47)
HGB BLD-MCNC: 14.7 G/DL (ref 11.7–15.7)
MCH RBC QN AUTO: 30.4 PG (ref 26.5–33)
MCHC RBC AUTO-ENTMCNC: 33.7 G/DL (ref 31.5–36.5)
MCV RBC AUTO: 90 FL (ref 78–100)
PLATELET # BLD AUTO: 287 10E9/L (ref 150–450)
POTASSIUM SERPL-SCNC: 3.9 MMOL/L (ref 3.4–5.3)
PROT SERPL-MCNC: 7.5 G/DL (ref 6.8–8.8)
RBC # BLD AUTO: 4.83 10E12/L (ref 3.8–5.2)
SODIUM SERPL-SCNC: 134 MMOL/L (ref 133–144)
TSH SERPL DL<=0.005 MIU/L-ACNC: 1.5 MU/L (ref 0.4–4)
WBC # BLD AUTO: 8.5 10E9/L (ref 4–11)

## 2018-10-09 PROCEDURE — 36415 COLL VENOUS BLD VENIPUNCTURE: CPT | Performed by: INTERNAL MEDICINE

## 2018-10-09 PROCEDURE — 85027 COMPLETE CBC AUTOMATED: CPT | Performed by: INTERNAL MEDICINE

## 2018-10-09 PROCEDURE — 90662 IIV NO PRSV INCREASED AG IM: CPT | Performed by: INTERNAL MEDICINE

## 2018-10-09 PROCEDURE — 84443 ASSAY THYROID STIM HORMONE: CPT | Performed by: INTERNAL MEDICINE

## 2018-10-09 PROCEDURE — G0008 ADMIN INFLUENZA VIRUS VAC: HCPCS | Performed by: INTERNAL MEDICINE

## 2018-10-09 PROCEDURE — 80053 COMPREHEN METABOLIC PANEL: CPT | Performed by: INTERNAL MEDICINE

## 2018-10-09 PROCEDURE — 99397 PER PM REEVAL EST PAT 65+ YR: CPT | Mod: 25 | Performed by: INTERNAL MEDICINE

## 2018-10-09 RX ORDER — LORAZEPAM 0.5 MG/1
TABLET ORAL
Qty: 90 TABLET | Refills: 1 | Status: SHIPPED | OUTPATIENT
Start: 2018-10-09 | End: 2019-03-25

## 2018-10-09 ASSESSMENT — ANXIETY QUESTIONNAIRES
6. BECOMING EASILY ANNOYED OR IRRITABLE: SEVERAL DAYS
IF YOU CHECKED OFF ANY PROBLEMS ON THIS QUESTIONNAIRE, HOW DIFFICULT HAVE THESE PROBLEMS MADE IT FOR YOU TO DO YOUR WORK, TAKE CARE OF THINGS AT HOME, OR GET ALONG WITH OTHER PEOPLE: NOT DIFFICULT AT ALL
GAD7 TOTAL SCORE: 1
1. FEELING NERVOUS, ANXIOUS, OR ON EDGE: NOT AT ALL
2. NOT BEING ABLE TO STOP OR CONTROL WORRYING: NOT AT ALL
5. BEING SO RESTLESS THAT IT IS HARD TO SIT STILL: NOT AT ALL
7. FEELING AFRAID AS IF SOMETHING AWFUL MIGHT HAPPEN: NOT AT ALL
3. WORRYING TOO MUCH ABOUT DIFFERENT THINGS: NOT AT ALL

## 2018-10-09 ASSESSMENT — PATIENT HEALTH QUESTIONNAIRE - PHQ9: 5. POOR APPETITE OR OVEREATING: NOT AT ALL

## 2018-10-09 NOTE — LETTER
[facility]  [address] [address]  [phone]      10/10/2018       Kathy Braswell  [address]  [facility] [address]        Dear Kathy,  Here are your most recent lab results. Unless commented on below, mild variation of results  outside the normal range are not clinically signicant.    Resulted Orders   Comprehensive metabolic panel   Result Value Ref Range    Sodium 134 133 - 144 mmol/L    Potassium 3.9 3.4 - 5.3 mmol/L    Chloride 98 94 - 109 mmol/L    Carbon Dioxide 27 20 - 32 mmol/L    Anion Gap 9 3 - 14 mmol/L    Glucose 98 70 - 99 mg/dL    Urea Nitrogen 14 7 - 30 mg/dL    Creatinine 0.64 0.52 - 1.04 mg/dL    GFR Estimate 89 >60 mL/min/1.7m2      Comment:      Non  GFR Calc    GFR Estimate If Black >90 >60 mL/min/1.7m2      Comment:       GFR Calc    Calcium 9.5 8.5 - 10.1 mg/dL    Bilirubin Total 0.7 0.2 - 1.3 mg/dL    Albumin 4.1 3.4 - 5.0 g/dL    Protein Total 7.5 6.8 - 8.8 g/dL    Alkaline Phosphatase 53 40 - 150 U/L    ALT 24 0 - 50 U/L    AST 13 0 - 45 U/L   TSH with free T4 reflex   Result Value Ref Range    TSH 1.50 0.40 - 4.00 mU/L   CBC with platelets   Result Value Ref Range    WBC 8.5 4.0 - 11.0 10e9/L    RBC Count 4.83 3.8 - 5.2 10e12/L    Hemoglobin 14.7 11.7 - 15.7 g/dL    Hematocrit 43.6 35.0 - 47.0 %    MCV 90 78 - 100 fl    MCH 30.4 26.5 - 33.0 pg    MCHC 33.7 31.5 - 36.5 g/dL    RDW 13.8 10.0 - 15.0 %    Platelet Count 287 150 - 450 10e9/L       Electrolyte, Glucose/Sugar, Hemoglobin, Kidney function, Liver, Platelets, Thyroid and White Blood Cells lab results were normal.  Your fatigue is likely related to sleep abnormalities such as possible sleep apnea. If willing to undergo a sleep study evaluation, then let me know and I will place a referral for you to be evaluated by the [facility]. At your recent appointment with me, you declined this at that time  Continue current  medication.  Please contact your pharmacy if you need further refills of your medication.   See me in 6 months for follow-up or earlier as needed    If you have further questions/concerns regarding the results, I would ask that you bring them to your next follow-up appointment with me and I would be happy to review them with you further.      Sincerely,      Rio Ospina MD  Internal Medicine

## 2018-10-09 NOTE — MR AVS SNAPSHOT
"              After Visit Summary   10/9/2018    Kathy Braswell    MRN: [id number]           Patient Information     Date Of Birth          1935        Visit Information        Provider Department      10/9/2018 8:00 AM Rio Ospina MD [facility]        Today's Diagnoses     Anxiety          Care Instructions    Labs as ordered  Restart CURVES exercise  Flu vaccine   Labs for fatigue symptoms   Check with insurance or speak with your pharmacist re: Shingrix vaccine coverage for shingles prevention.  This is a 2 shot series done 2-6 months apart          Follow-ups after your visit        Who to contact     If you have questions or need follow up information about today's clinic visit or your schedule please contact [facility] directly at [phone].  Normal or non-critical lab and imaging results will be communicated to you by MyChart, letter or phone within 4 business days after the clinic has received the results. If you do not hear from us within 7 days, please contact the clinic through MyChart or phone. If you have a critical or abnormal lab result, we will notify you by phone as soon as possible.  Submit refill requests through [facility] or call your pharmacy and they will forward the refill request to us. Please allow 3 business days for your refill to be completed.          Additional Information About Your Visit        MyChart Information     [facility] lets you send messages to your doctor, view your test results, renew your prescriptions, schedule appointments and more. To sign up, go to [email or web address] . Click on \"Log in\" on the left side of the screen, which will take you to the Welcome page. Then click on \"Sign up Now\" on the right side of the page.     You will be asked to enter the access code listed below, as well as some personal information. Please follow the directions to create your username and password.     Your access code is: " [email or web address]  Expires: 2018  2:01 PM     Your access code will  in 90 days. If you need help or a new code, please call your [facility] clinic or [phone].        Care EveryWhere ID     This is your Care EveryWhere ID. This could be used by other organizations to access your [facility] medical records  [id number]        Your Vitals Were     Pulse Temperature Respirations Pulse Oximetry BMI (Body Mass Index)       80 97.8  F (36.6  C) (Oral) 16 95% 27.64 kg/m2        Blood Pressure from Last 3 Encounters:   10/09/18 132/70   18 144/76   18 128/70    Weight from Last 3 Encounters:   10/09/18 130 lb (59 kg)   18 131 lb (59.4 kg)   18 139 lb (63 kg)              Today, you had the following     No orders found for display         Where to get your medicines      Some of these will need a paper prescription and others can be bought over the counter.  Ask your nurse if you have questions.     Bring a paper prescription for each of these medications     LORazepam 0.5 MG tablet          Primary Care Provider Office Phone # Fax #    Rio Ospina [phone]       [address] [place] [address]        Equal Access to Services     AMITA GARCIA AH: Hadii karena ku hadasho Soomaali, waaxda luqadaha, qaybta kaalmada adeegyada, waxay idiin hayaan jyothi duque . So [facility] [phone].    ATENCIÓN: Si habla español, tiene a viveros disposición servicios gratuitos de asistencia lingüística. Llame al [phone].    We comply with applicable federal civil rights laws and [place] laws. We do not discriminate on the basis of race, color, national origin, age, disability, sex, sexual orientation, or gender identity.            Thank you!     Thank you for choosing [facility]  for your care. Our goal is always to provide you with excellent care. Hearing back from our patients is one way we can continue to improve our services. Please take a few minutes to  complete the written survey that you may receive in the mail after your visit with us. Thank you!             Your Updated Medication List - Protect others around you: Learn how to safely use, store and throw away your medicines at www.disposemymeds.org.          This list is accurate as of 10/9/18  8:47 AM.  Always use your most recent med list.                   Brand Name Dispense Instructions for use Diagnosis    alendronate 70 MG tablet    FOSAMAX    12 tablet    Take 1 tablet (70 mg) by mouth every 7 days Take with over 8 ounces water and stay upright for at least 30 minutes after dose.  Take at least 60 minutes before breakfast    Osteopenia, unspecified location       busPIRone 15 MG tablet    BUSPAR    90 tablet    1 tab daily    Anxiety       cholecalciferol 1000 UNIT tablet    vitamin D3     Take 1 tablet by mouth daily.        FLUoxetine 20 MG capsule    PROzac    90 capsule    Take 1 capsule (20 mg) by mouth daily    Anxiety       fluticasone 50 MCG/ACT spray    FLONASE    1 Bottle    Spray 1-2 sprays into both nostrils daily    Dysfunction of right eustachian tube       irbesartan-hydrochlorothiazide 150-12.5 MG per tablet    AVALIDE    90 tablet    Take 1 tablet by mouth daily    Essential hypertension, benign       LORazepam 0.5 MG tablet    ATIVAN    90 tablet    TAKE 1 TABLET BY MOUTH EVERY DAY AS NEEDED    Anxiety       order for DME     1 each    BP cuff, brand as covered by insurance.  Dx: HTN    Essential hypertension, benign       TYLENOL 325 MG tablet   Generic drug:  acetaminophen      Take 1-2 tablets (325-650 mg) by mouth every 6 hours as needed for mild pain        zolpidem 6.25 MG CR tablet    AMBIEN CR    30 tablet    TAKE 1 TABLET BY MOUTH NIGHTLY AS NEEDED FOR SLEEP    Insomnia, unspecified type

## 2018-10-09 NOTE — PROGRESS NOTES

## 2018-10-10 ASSESSMENT — ANXIETY QUESTIONNAIRES: GAD7 TOTAL SCORE: 1

## 2018-10-10 ASSESSMENT — PATIENT HEALTH QUESTIONNAIRE - PHQ9: SUM OF ALL RESPONSES TO PHQ QUESTIONS 1-9: 3

## 2018-10-15 ENCOUNTER — TELEPHONE (OUTPATIENT)
Dept: INTERNAL MEDICINE | Facility: CLINIC | Age: 83
End: 2018-10-15

## 2018-10-15 NOTE — TELEPHONE ENCOUNTER
Reason for Call:  Request for results:    Name of test or procedure: Lab results    Date of test of procedure: 10/9    Location of the test or procedure: [facility] to leave the result message on voice mail or with a family member? YES    Phone number Patient can be reached at:  Home number on file [phone] (home)    Additional comments: Patient has not received a call or anything in the mail in regards to results. Please call and send out results to patient.    Call taken on 10/15/2018 at 11:09 AM by Chanel Frederick

## 2018-10-17 ENCOUNTER — TELEPHONE (OUTPATIENT)
Dept: INTERNAL MEDICINE | Facility: CLINIC | Age: 83
End: 2018-10-17

## 2018-10-17 NOTE — TELEPHONE ENCOUNTER
Reason for Call:  Request for results:    Name of test or procedure: labs    Date of test of procedure: 10/09/2018    Location of the test or procedure: [facility]    OK to leave the result message on voice mail or with a family member? YES    Phone number Patient can be reached at:  Home number on file [phone] (home)    Additional comments: Patient requesting lab results to mail to her house.    Call taken on 10/17/2018 at 10:58 AM by SANTA HENRANDEZ

## 2018-10-23 ENCOUNTER — PATIENT OUTREACH (OUTPATIENT)
Dept: CARE COORDINATION | Facility: CLINIC | Age: 83
End: 2018-10-23

## 2018-10-23 NOTE — LETTER
[place] CARE COORDINATION  [address] [facility] [address]  October 23, 2018    Kathy Braswell  [address]  [facility] [address]      Dear Kahty,    I am a clinic care coordinator who works with Rio Ospina MD at [place]. I recently tried to call and was unable to reach you. I wanted to introduce myself and provide you with my contact information so that you can call me with questions or concerns about your health care. Below is a description of clinic care coordination and how I can further assist you.     The clinic care coordinator is a registered nurse and/or  who understand the health care system. The goal of clinic care coordination is to help you manage your health and improve access to the [facility] system in the most efficient manner. The registered nurse can assist you in meeting your health care goals by providing education, coordinating services, and strengthening the communication among your providers. The  can assist you with financial, behavioral, psychosocial, chemical dependency, counseling, and/or psychiatric resources.    Please feel free to contact me at [phone], with any questions or concerns. We at [facility] are focused on providing you with the highest-quality healthcare experience possible and that all starts with you.     Sincerely,     Jayshree Sanderson    Enclosed: I have enclosed a copy of a 24 Hour Access Plan. This has helpful phone numbers for you to call when needed. Please keep this in an easy to access place to use as needed.

## 2018-10-23 NOTE — LETTER
Health Care Home - Access Care Plan    About Me  Patient Name:  Kathy Braswell    YOB: 1935  Age:                             83 year old   Renee MRN:            [id number] Telephone Information:     Home Phone [phone]   Mobile [phone]       Address:    [address]  [facility] [address] Email address:  No e-mail address on record      Emergency Contact(s)  Name Relationship Lgl Grd Work Phone Home Phone Mobile Phone   1. NICKOLAS PENA Sister  none [phone]   2. MICAELA BRASWELL Son  [phone]             Health Maintenance: Routine Health maintenance Reviewed: Due/Overdue     My Access Plan  Medical Emergency 911   Questions or concerns during clinic hours Primary Clinic Line, I will call the clinic directly: [facility] - [phone]   24 Hour Appointment Line [phone] or  [phone]) (toll free)   24 Hour Nurse Line [phone] (toll free)   Questions or concerns outside clinic hours 24 Hour Appointment Line, I will call the after-hours on-call line:   [facility] [phone] or [phone] ([phone]) (toll-free)   Preferred Urgent Care     Preferred Hospital     Preferred Pharmacy [facility] [address] [place] [address] - [address]     Behavioral Health Crisis Line The National Suicide Prevention Lifeline at 1-688.891.7471 or 911     My Care Team Members  Patient Care Team       Relationship Specialty Notifications Start End    Veum, Rio HAWK MD PCP - General   2/15[birth year]02     Phone: [phone] Fax: [phone]         [address] [facility] [address]    Jayshree Sanderson, [facility] Care Coordinator   10/23/18            My Medical and Care Information  Problem List   Patient Active Problem List   Diagnosis     Essential hypertension, benign     Esophageal reflux     Allergic rhinitis     Insomnia     Benign neoplasm of colon      Generalized osteoarthrosis, unspecified site     Plantar fascia syndrome     Anxiety     Colon polyps     CARDIOVASCULAR SCREENING; LDL GOAL LESS THAN 130     Advanced directives, counseling/discussion     Mild major depression (H)     Lumbago     Irritable bowel syndrome     Osteopenia      Current Medications and Allergies:  See printed Medication Report

## 2018-10-23 NOTE — PROGRESS NOTES
Clinic Care Coordination Contact  [facility]/Voicemail       Clinical Data: Care Coordinator Outreach  Outreach attempted x 1.  Left message on voicemail with call back information and requested return call.  Plan: Care Coordinator will mail out care coordination introduction letter with care coordinator contact information and explanation of care coordination services. Care Coordinator will try to reach patient again in 3-5 business days.    Jayshree Sanderson [facility]  Clinic Care Coordinator - [facility], and [facility]  Ph: [phone]  [email or web address]  (Today's date: 10/23/18)    Update: Patient called at 11:06am and expressed confusion about her coverage. She is concerned about being able to continue seeing her PCP at [facility]. She was told by someone at [facility], about a week ago, that she would no longer be covered for [facility]. She would like to know if this is true and, if it is, she would like to know where she can go for primary care. SW asked patient to gather her ID and contact information for both Medicare and [facility]. When CC asked patient if she felt comfortable calling Medicare or , patient expressed feeling overwhelmed and asked if CC could do it. SWCC then called [facility]. SWCC was on hold for over 30 mins and gave up.    Update: SWCC called patient at 3:18pm to discuss flyer re: Medicare changes this fall. Turns out patient had received and read that flyer and subsequently asked doctor about it, which is why CC referral was received in the first place. Patient thought SWCC was someone from Medicare and needed CC role explained a couple times throughout call. SWCC spent some time looking at links provided by flyer to provide patient with the answers she wanted. After no luck, SWCC encouraged patient to call Medicare and ask, specifically, if she needs to change her plan. Patient seems to be easily confused by details, so SWCC suggested  asking that one simple question and going from there.    Plan: [facility] will be available for any question. Patient is going to call Medicare.    Jayshree Sanderson [facility]  Clinic Care Coordinator - [facility], and [facility]  Ph: [phone]  [email or web address]  (Today's date: 10/23/18)

## 2018-10-25 DIAGNOSIS — G47.00 INSOMNIA, UNSPECIFIED TYPE: ICD-10-CM

## 2018-10-25 RX ORDER — ZOLPIDEM TARTRATE 6.25 MG/1
TABLET, FILM COATED, EXTENDED RELEASE ORAL
Qty: 30 TABLET | Refills: 2 | Status: SHIPPED | OUTPATIENT
Start: 2018-10-25 | End: 2019-01-30

## 2018-10-25 NOTE — TELEPHONE ENCOUNTER
ambien      Last Written Prescription Date:  8/1/18  Last Fill Quantity: , 30  # refills:1  Last Office Visit: 10/9/18  Future Office visit:       Routing refill request to provider for review/approval because:  Drug not on the [facility], P or [facility] refill protocol or controlled substance

## 2018-10-31 ENCOUNTER — PATIENT OUTREACH (OUTPATIENT)
Dept: CARE COORDINATION | Facility: CLINIC | Age: 83
End: 2018-10-31

## 2018-10-31 NOTE — PROGRESS NOTES
"Clinic Care Coordination Contact    Clinic Care Coordination Contact  OUTREACH    Referral Information:  Referral Source: Care Team         Chief Complaint   Patient presents with     Clinic Care Coordination - Follow-up     Questions about Medicare coverage/changes        Universal Utilization: Patient extremely confused about upcoming changes with Medicare plans.   Clinic Utilization  Difficulty keeping appointments:: No  Compliance Concerns: No  No-Show Concerns: No  No PCP office visit in Past Year: No  Utilization    Last refreshed: 10/25/2018  3:19 PM:  No Show Count (past year) 0       Last refreshed: 10/25/2018  3:19 PM:  ED visits 1       Last refreshed: 10/25/2018  3:19 PM:  Hospital admissions 0          Current as of: 10/25/2018  3:19 PM             Clinical Concerns:  Current Medical Concerns:  None known at this time.    Current Behavioral Concerns: Patient confused about Medicare coverage changes and repeatedly unable to continue problem solving with SWCC, responding with \"I'm just so confused...you people are really confusing me.\"    Education Provided to patient: SWCC explained several times that patient needs to select a new Medicare plan, that selecting a doctor is separate from selecting a plan, and that it is important for the doctor to be covered by the plan.   Pain  Pain (GOAL):: No  Health Maintenance Reviewed: Due/Overdue     Diet/Exercise/Sleep:  Inadequate nutrition (GOAL):: No  Food Insecurity: No  Tube Feeding: No  Inadequate activity/exercise (GOAL):: No  Significant changes in sleep pattern (GOAL): No    Transportation:           Psychosocial:  Informal Support system:: Friends   Financial/Insurance:   Financial/Insurance concerns (GOAL):: Yes (Current Medicare coverage is ending and patient is very confused about selecting new plan)  Patient stated her friend has been helping her understand the Medicare changes in coverage and she has decided to choose a new doctor, going to [address] to schedule an appointment. Patient will bring flyer from [facility] informing patients of Medicare coverage interruption and need to select new plan (two easy options being UCare or BCBS). SWCC is hoping she will be able to communicate her needs re: insurance coverage and confirm coverage which - if any - of the new plans will cover services at that clinic.    Patient/Caregiver understanding: Pt reports understanding and denies any additional questions or concerns at this times. SW CC engaged in AIDET communication during encounter.    Outreach Frequency: 2 weeks      Plan: SWCC will follow up later this week to see if patient has scheduled appointment. SWCC will coordinate to find out details about clinic and will request consent to call that clinic and explain the situation so that they can assist patient with insurance coverage questions when she comes in for scheduled appointment.    Jayshree Sanderson [facility]  Clinic Care Coordinator - [facility], and [facility]  Ph: [phone]  [email or web address]  (Today's date: 10/31/18)

## 2018-11-01 ENCOUNTER — PATIENT OUTREACH (OUTPATIENT)
Dept: CARE COORDINATION | Facility: CLINIC | Age: 83
End: 2018-11-01

## 2018-11-01 NOTE — PROGRESS NOTES
Clinic Care Coordination Contact  Clinic Care Coordination Contact  OUTREACH    Referral Information: Care Team     [place] Utilization: Patient concerned about Medicare changes and how that will impact where she can get her primary care services.     Utilization    Last refreshed: 10/31/2018  1:05 PM:  No Show Count (past year) 0       Last refreshed: 10/31/2018  1:05 PM:  ED visits 1       Last refreshed: 10/31/2018  1:05 PM:  Hospital admissions 0          Current as of: 10/31/2018  1:05 PM             Clinical Concerns:  Current Medical Concerns:  None known at this time.     Current Behavioral Concerns: Patient anxious and confused about Medicare changes and presenting as having some cognitive barriers to problem-solving this issue and making decision necessary to secure coverage for the future.  Education Provided to patient: [facility] prompted patient to bring [employer] flyer about Medicare changes to her scheduled appointment [facility] Dr[facility] Lance next Wed [facility] [facility] [place].      Financial/Insurance:   Upcoming changes to Medicare coverage require a decision about new options.     Patient/Caregiver understanding: Pt reports understanding and denies any additional questions or concerns at this times.  CC engaged in AIDET communication during encounter.    Plan: [facility] will call next week to coordinate with  re: recent conversations with patient about Medicare changes and related concerns and will also call patient to remind to bring flyer to appointment.    Jayshree Sanderson [facility]  Clinic Care Coordinator - [facility], and [facility]  Ph: [phone]  [email or web address]  (Today's date: 11/2/18)

## 2018-11-06 ENCOUNTER — PATIENT OUTREACH (OUTPATIENT)
Dept: CARE COORDINATION | Facility: CLINIC | Age: 83
End: 2018-11-06

## 2018-11-06 NOTE — PROGRESS NOTES
Clinic Care Coordination Contact    Clinic Care Coordination Contact  OUTREACH    Referral Information:  Referral Source: Care Team         Chief Complaint   Patient presents with     Clinic Care Coordination - Follow-up     PCP referral        Universal Utilization: Patient stated she is thinking of changing to [facility] because they go to [facility]. She stated she cancelled tomorrow's appointment and rescheduled with Dr. [address] at the same clinic for the 13th of this month.     Clinic Utilization  Difficulty keeping appointments:: No  Compliance Concerns: No  No-Show Concerns: No  No PCP office visit in Past Year: No  Utilization    Last refreshed: 11/2/2018  9:04 AM:  No Show Count (past year) 0       Last refreshed: 11/2/2018  9:04 AM:  ED visits 1       Last refreshed: 11/2/2018  9:04 AM:  Hospital admissions 0          Current as of: 11/2/2018  9:04 AM             Clinical Concerns:  Current Medical Concerns:  None known at this time.    Current Behavioral Concerns: Patient gets easily confused and then becomes frustrated.    Education Provided to patient: Psychiatric provided contact info for [facility] [employer] to schedule with someone in order to meet and get assistance with understanding this option for Medicare. CC explained there is a number for [facility] Medicare information if patient chooses to go that route.    Pain  Pain (GOAL):: No  Health Maintenance Reviewed:      Functional Status:  Bed or wheelchair confined:: No    Living Situation:       Diet/Exercise/Sleep:  Inadequate nutrition (GOAL):: No  Food Insecurity: No  Tube Feeding: No  Inadequate activity/exercise (GOAL):: No  Significant changes in sleep pattern (GOAL): No    Transportation:           Psychosocial:  Informal Support system:: Friends     Financial/Insurance:   Financial/Insurance concerns (GOAL):: Yes (Current Medicare coverage is ending and patient is very confused about selecting new plan)     Patient/Caregiver understanding: Pt  reports understanding and denies any additional questions or concerns at this times. SW CC engaged in AIDET communication during encounter.    Outreach Frequency: 2 weeks      Plan: Patient stated she wants to stick with BCBS for now and plans to call the retail center to get more information. SWCC will outreach again in one week to review progress and provide support.    Jayshree Sanderson [facility]  Clinic Care Coordinator - [facility], and [facility]  Ph: [phone]  [email or web address]  (Today's date: 11/6/18)

## 2018-11-13 ENCOUNTER — PATIENT OUTREACH (OUTPATIENT)
Dept: CARE COORDINATION | Facility: CLINIC | Age: 83
End: 2018-11-13

## 2018-11-13 NOTE — PROGRESS NOTES
Clinic Care Coordination Contact    Clinic Care Coordination Contact  OUTREACH    Referral Information:            Chief Complaint   Patient presents with     Clinic Care Coordination - Follow-up        Clinical Concerns:  Current Medical Concerns:  None known at this time.    Current Behavioral Concerns: Patient becomes frustrated and overwhelmed by Medicare changes.      Patient states she has an appointment with a woman tomorrow to review insurance options and get assistance in selecting one that meets patient's needs.    Education Provided to patient: SWCC role reviewed.      Health Maintenance Reviewed:    Goals:   Goals        General    Psychosocial (pt-stated)     Notes - Note created  11/13/2018 11:08 AM by Jayshree Sanderson JARRETT    Goal Statement: I will have chosen a new insurance plan through Medicare.  Measure of Success: Meet with someone to talk about options, identify the best insurance option for me, identify a PCP that is covered by new insurance.  Supportive Steps to Achieve: Utilize care coordination services, utilize natural supports, practice coping skills like taking a break or deep breathing.  Barriers: Become overwhelmed and struggle with comprehension.  Strengths: Independent, engaged with care coordination, understanding logistics of Medicare changes.  Date to Achieve By: 12/4/18.  Patient expressed understanding of goal: Pt reports understanding and denies any additional questions or concerns at this times. SW CC engaged in AIDET communication during encounter.                Patient/Caregiver understanding: Pt reports understanding and denies any additional questions or concerns at this times. SW CC engaged in AIDET communication during encounter.       Plan: SWCC will outreach again in one week to review progress with selecting new insurance plan with Medicare.

## 2018-11-13 NOTE — LETTER
[place] CARE COORDINATION  [address] [facility] [address]  November 13, 2018    Kathy Braswell  [address]  [facility] [address]      Dear Kathy,    I am a clinic care coordinator who works with Rio Ospina MD at [facility]. I wanted to thank you for spending the time to talk with me.  I wanted to send you a copy of the plan we discussed today. Please feel free to contact me at [phone], with any questions or concerns. We at [facility] are focused on providing you with the highest-quality healthcare experience possible and that all starts with you.     Sincerely,     Jayshree Sanderson    Enclosed: I have enclosed a copy of the Complex Care Plan. This has helpful information and goals that we have talked about. Please keep this in an easy to access place to use as needed.

## 2018-11-13 NOTE — LETTER
[facility]  Complex Care Plan  About Me  Patient Name:  Kathy Braswell    YOB: 1935  Age:     83 year old   [facility] MRN:   [id number] Telephone Information:    Home Phone [phone]   Mobile [phone]       Address:    [address]  [facility] [address] Email address:  No e-mail address on record      Emergency Contact(s)  Name Relationship Lgl Grd Work Phone Home Phone Mobile Phone   1. NICKOLAS PENA Sister  none [phone]   2. MICAELA BRASWELL Son  [phone]           Primary language:  English     needed? No   [facility] [employer]:  [phone] op. 1  Other communication barriers:    Preferred Method of Communication:  Phone  Current living arrangement:    Mobility Status/ Medical Equipment:      Health Maintenance  Health Maintenance Reviewed:      My Access Plan  Medical Emergency 911   Primary Clinic Line [facility] [phone]   24 Hour Appointment Line [phone] or  [phone]) (toll-free)   24 Hour Nurse Line [phone] (toll-free)   Preferred Urgent Care     Preferred Hospital     Preferred Pharmacy [facility] [address] [place] [address] - [address]     Behavioral Health Crisis Line The National Suicide Prevention Lifeline at 1-614.361.3042 or 911     My Care Team Members    Patient Care Team       Relationship Specialty Notifications Start End    VeRio castellanos MD PCP - General   2/15/02     Phone: [phone] Fax: [phone]         [address] [facility] [address]    Jayshree Sanderson LGSW Clinic Care Coordinator   10/23/18             My Care Plans  Self Management and Treatment Plan  Goals and (Comments)  Goals        General    Psychosocial (pt-stated)     Notes - Note created  11/13/2018 11:08 AM by Jayshree Sanderson LGSW    Goal Statement: I will have chosen a new insurance plan through Medicare.  Measure  of Success: Meet with someone to talk about options, identify the best insurance option for me, identify a PCP that is covered by new insurance.  Supportive Steps to Achieve: Utilize care coordination services, utilize natural supports, practice coping skills like taking a break or deep breathing.  Barriers: Become overwhelmed and struggle with comprehension.  Strengths: Independent, engaged with care coordination, understanding logistics of Medicare changes.  Date to Achieve By: 12/4/18.  Patient expressed understanding of goal: Pt reports understanding and denies any additional questions or concerns at this times. JARRETT CC engaged in AIDET communication during encounter.               Action Plans on File:                       Advance Care Plans/Directives Type:        My Medical and Care Information  Problem List   Patient Active Problem List   Diagnosis     Essential hypertension, benign     Esophageal reflux     Allergic rhinitis     Insomnia     Benign neoplasm of colon     Generalized osteoarthrosis, unspecified site     Plantar fascia syndrome     Anxiety     Colon polyps     CARDIOVASCULAR SCREENING; LDL GOAL LESS THAN 130     Advanced directives, counseling/discussion     Mild major depression (H)     Lumbago     Irritable bowel syndrome     Osteopenia      Current Medications and Allergies:  See printed Medication Report.    Care Coordination Start Date: No linked episodes   Frequency of Care Coordination:     Form Last Updated: 11/13/2018

## 2018-11-21 ENCOUNTER — PATIENT OUTREACH (OUTPATIENT)
Dept: CARE COORDINATION | Facility: CLINIC | Age: 83
End: 2018-11-21

## 2018-11-21 NOTE — PROGRESS NOTES
Clinic Care Coordination Contact    Clinic Care Coordination Contact  OUTREACH    Referral Information:  Referral Source: Care Team         Chief Complaint   Patient presents with     Clinic Care Coordination - Follow-up        [place] Utilization:   Clinic Utilization  Difficulty keeping appointments:: No  Compliance Concerns: No  No-Show Concerns: No  No PCP office visit in Past Year: No  Utilization    Last refreshed: 11/16/2018  1:35 AM:  No Show Count (past year) 0       Last refreshed: 11/16/2018  1:35 AM:  ED visits 1       Last refreshed: 11/16/2018  1:35 AM:  Hospital admissions 0          Current as of: 11/16/2018  1:35 AM             Clinical Concerns:  Current Medical Concerns:  None known at this time.    Current Behavioral Concerns: None known at this time.    Education Provided to patient: CC role reviewed.   Pain  Pain (GOAL):: No  Health Maintenance Reviewed: Due/Overdue   Functional Status:  Bed or wheelchair confined:: No    Living Situation:  Current living arrangement:: I live alone    Diet/Exercise/Sleep:  Inadequate nutrition (GOAL):: No  Food Insecurity: No  Tube Feeding: No  Inadequate activity/exercise (GOAL):: No  Significant changes in sleep pattern (GOAL): No    Transportation:           Psychosocial:  Mosque or spiritual beliefs that impact treatment:: No  Mental health DX:: No  Mental health management concern (GOAL):: No  Informal Support system:: Friends     Financial/Insurance: Financial/Insurance concerns (GOAL):: No, patient states she chose [facility] and has all the insurance questions resolved.     Resources and Interventions:  Current Resources:    ;   Community Resources: None          Advance Care Plan/Directive  Advanced Care Plans/Directives on file:: No    Referrals Placed: None     Goals:   Goals        General    Psychosocial (pt-stated)     Notes - Note created  11/13/2018 11:08 AM by Jayshree Sanderson LGSW    Goal Statement: I will have chosen a new insurance plan  "through Medicare.  Measure of Success: Meet with someone to talk about options, identify the best insurance option for me, identify a PCP that is covered by new insurance.  Supportive Steps to Achieve: Utilize care coordination services, utilize natural supports, practice coping skills like taking a break or deep breathing.  Barriers: Become overwhelmed and struggle with comprehension.  Strengths: Independent, engaged with care coordination, understanding logistics of Medicare changes.  Date to Achieve By: 12/4/18.  Patient expressed understanding of goal: Pt reports understanding and denies any additional questions or concerns at this times. SW CC engaged in AIDET communication during encounter.                Patient/Caregiver understanding: Pt reports understanding and denies any additional questions or concerns at this times. SW CC engaged in AIDET communication during encounter.      Plan: Patient is now in \"Graduated\" CC status and no further outreaches at this time. Patient will be mailed contact information should she need CC services in the future.    Jayshree Sanderson [facility]  Clinic Care Coordinator - [facility], and [facility]  Ph: [phone]  [email or web address]  (Today's date: 11/21/18)      "

## 2018-11-21 NOTE — LETTER
[place] CARE COORDINATION  [address] [facility] [address]  November 21, 2018    Kathy Braswell  [address]  [facility] [address]      Dear Kathy,    I am a clinic care coordinator who works with Rio Ospina MD at [facility]. I wanted to thank you for spending the time to talk with me.  I also wanted to you with our contact information so that you can call us with questions or concerns about your health care. Below is a description of clinic care coordination and how we can further assist you.     The clinic care coordinator is a registered nurse and/or  who understand the health care system. The goal of clinic care coordination is to help you manage your health and improve access to the [facility] system in the most efficient manner. The registered nurse can assist you in meeting your health care goals by providing education, coordinating services, and strengthening the communication among your providers. The  can assist you with financial, behavioral, psychosocial, chemical dependency, counseling, and/or psychiatric resources.    Please feel free to contact me at [phone], with any questions or concerns. I will be available through November 30, 2018 - after that you can call your clinic ([phone]) and ask for Care Coordination, they will be able to help you out. We at [facility] are focused on providing you with the highest-quality healthcare experience possible and that all starts with you.     Sincerely,     Jayshree Sanderson    Enclosed: I have enclosed a copy of a 24 Hour Access Plan. This has helpful phone numbers for you to call when needed. Please keep this in an easy to access place to use as needed.

## 2018-11-21 NOTE — LETTER
Health Care Home - Access Care Plan    About Me  Patient Name:  Kathy Braswell    YOB: 1935  Age:                             83 year old   [facility] MRN:            [id number] Telephone Information:     Home Phone [phone]   Mobile [phone]       Address:    [address]  [facility] [address] Email address:  No e-mail address on record      Emergency Contact(s)  Name Relationship Lgl Grd Work Phone Home Phone Mobile Phone   1. NICKOLAS PENA Sister  none [phone]   2. MICAELA BRASWELL Son  [phone]             Health Maintenance: Routine Health maintenance Reviewed: Due/Overdue     My Access Plan  Medical Emergency 911   Questions or concerns during clinic hours Primary Clinic Line, I will call the clinic directly: [facility] - [phone]   24 Hour Appointment Line [phone] or  [phone]) (toll free)   24 Hour Nurse Line [phone] (toll free)   Questions or concerns outside clinic hours 24 Hour Appointment Line, I will call the after-hours on-call line:   [facility] [phone] or [phone]) (toll-free)   Preferred Urgent Care [facility], [phone]   Preferred Hospital     Preferred Pharmacy [facility] [address] [place] [address] - [address]     Behavioral Health Crisis Line The National Suicide Prevention Lifeline at 1-215.249.6231 or 911     My Care Team Members  Patient Care Team       Relationship Specialty Notifications Start End    Veum, Rio HAWK MD PCP - General   2/15/02     Phone: [phone] Fax: [phone]         [address] [facility] [address]           My Medical and Care Information  Problem List   Patient Active Problem List   Diagnosis     Essential hypertension, benign     Esophageal reflux     Allergic rhinitis     Insomnia     Benign neoplasm of colon     Generalized  osteoarthrosis, unspecified site     Plantar fascia syndrome     Anxiety     Colon polyps     CARDIOVASCULAR SCREENING; LDL GOAL LESS THAN 130     Advanced directives, counseling/discussion     Mild major depression (H)     Lumbago     Irritable bowel syndrome     Osteopenia      Current Medications and Allergies:  See printed Medication Report

## 2019-01-30 DIAGNOSIS — G47.00 INSOMNIA, UNSPECIFIED TYPE: ICD-10-CM

## 2019-01-30 NOTE — TELEPHONE ENCOUNTER
ambien      Last Written Prescription Date:  10/25/18  Last Fill Quantity: 30,   # refills: 2  Last Office Visit: 10/9/18  Future Office visit:       Routing refill request to provider for review/approval because:  Drug not on the [facility], P or [facility] refill protocol or controlled substance

## 2019-01-31 RX ORDER — ZOLPIDEM TARTRATE 6.25 MG/1
TABLET, FILM COATED, EXTENDED RELEASE ORAL
Qty: 90 TABLET | Refills: 1 | Status: SHIPPED | OUTPATIENT
Start: 2019-01-31 | End: 2019-10-28

## 2019-03-25 ENCOUNTER — TELEPHONE (OUTPATIENT)
Dept: INTERNAL MEDICINE | Facility: CLINIC | Age: 84
End: 2019-03-25

## 2019-03-25 DIAGNOSIS — F41.9 ANXIETY: ICD-10-CM

## 2019-03-26 DIAGNOSIS — F41.9 ANXIETY: ICD-10-CM

## 2019-03-26 RX ORDER — LORAZEPAM 0.5 MG/1
TABLET ORAL
Qty: 90 TABLET | Refills: 0 | OUTPATIENT
Start: 2019-03-26

## 2019-03-26 RX ORDER — LORAZEPAM 0.5 MG/1
TABLET ORAL
Qty: 90 TABLET | Refills: 0 | Status: SHIPPED | OUTPATIENT
Start: 2019-03-26 | End: 2019-06-28

## 2019-03-26 NOTE — TELEPHONE ENCOUNTER
Patient due to see PCP as per last letter from him in 10/2018 after labs were done. - 6 months follow up - which would be next month    Refill done #90 no refills.  Printed signed  at [facility] rx folder

## 2019-03-26 NOTE — TELEPHONE ENCOUNTER
Requested Prescriptions   Pending Prescriptions Disp Refills     LORazepam (ATIVAN) 0.5 MG tablet [Pharmacy Med Name: LORAZEPAM 0.5MG TABLETS] 90 tablet 0     Sig: TAKE 1 TABLET BY MOUTH EVERY DAY AS NEEDED    There is no refill protocol information for this order        Last Written Prescription Date:  10/9/2018  Last Fill Quantity: 90,  # refills: 1   Last office visit: 10/9/2018 with prescribing provider:  10/9/2018   Future Office Visit:

## 2019-04-03 DIAGNOSIS — I10 ESSENTIAL HYPERTENSION, BENIGN: ICD-10-CM

## 2019-04-03 RX ORDER — IRBESARTAN AND HYDROCHLOROTHIAZIDE 150; 12.5 MG/1; MG/1
1 TABLET, FILM COATED ORAL DAILY
Qty: 90 TABLET | Refills: 1 | Status: SHIPPED | OUTPATIENT
Start: 2019-04-03 | End: 2019-07-08

## 2019-04-03 NOTE — TELEPHONE ENCOUNTER
"Requested Prescriptions   Pending Prescriptions Disp Refills     irbesartan-hydrochlorothiazide (AVALIDE) 150-12.5 MG tablet [Pharmacy Med Name: IRBESARTAN/HCTZ 150-12.5MG TABLETS] 90 tablet 0     Sig: TAKE 1 TABLET BY MOUTH DAILY    Angiotensin-II Receptors Passed - 4/3/2019  9:48 AM       Passed - Blood pressure under 140/90 in past 12 months    BP Readings from Last 3 Encounters:   10/09/18 132/70   09/21/18 144/76   04/23/18 128/70                Passed - Recent (12 mo) or future (30 days) visit within the authorizing provider's specialty    Patient had office visit in the last 12 months or has a visit in the next 30 days with authorizing provider or within the authorizing provider's specialty.  See \"Patient Info\" tab in inbasket, or \"Choose Columns\" in Meds & Orders section of the refill encounter.             Passed - Medication is active on med list       Passed - Patient is age 18 or older       Passed - No active pregnancy on record       Passed - Normal serum creatinine on file in past 12 months    Recent Labs   Lab Test 10/09/18  0902  12/17/14  1152   CR 0.64   < >  --    CREAT  --   --  0.6    < > = values in this interval not displayed.            Passed - Normal serum potassium on file in past 12 months    Recent Labs   Lab Test 10/09/18  0902   POTASSIUM 3.9                   Passed - No positive pregnancy test in past 12 months        Last Written Prescription Date:  3/8/18  Last Fill Quantity: 90,  # refills: 3   Last office visit: 10/9/2018 with prescribing provider:  10/9/18   Future Office Visit:      "

## 2019-04-05 DIAGNOSIS — F41.9 ANXIETY: ICD-10-CM

## 2019-04-05 NOTE — TELEPHONE ENCOUNTER
"Requested Prescriptions   Pending Prescriptions Disp Refills     FLUoxetine (PROZAC) 20 MG capsule [Pharmacy Med Name: FLUOXETINE 20MG CAPSULES] 90 capsule 0    Last Written Prescription Date:  3/8/2018  Last Fill Quantity: 90,  # refills: 3   Last office visit: 10/9/2018 with prescribing provider:  10/9/2018   Future Office Visit:     Sig: TAKE 1 CAPSULE BY MOUTH EVERY DAY    SSRIs Protocol Passed - 4/5/2019  9:02 AM  PHQ-9 SCORE 7/14/2017 3/8/2018 10/9/2018   PHQ-9 Total Score - - -   PHQ-9 Total Score 3 3 3     JUANPABLO-7 SCORE 7/14/2017 3/8/2018 10/9/2018   Total Score - - -   Total Score 6 3 1                Passed - Recent (12 mo) or future (30 days) visit within the authorizing provider's specialty    Patient had office visit in the last 12 months or has a visit in the next 30 days with authorizing provider or within the authorizing provider's specialty.  See \"Patient Info\" tab in inbasket, or \"Choose Columns\" in Meds & Orders section of the refill encounter.             Passed - Medication is active on med list       Passed - Patient is age 18 or older       Passed - No active pregnancy on record       Passed - No positive pregnancy test in last 12 months          "

## 2019-04-29 DIAGNOSIS — F41.9 ANXIETY: ICD-10-CM

## 2019-04-29 NOTE — TELEPHONE ENCOUNTER
"Requested Prescriptions   Pending Prescriptions Disp Refills     busPIRone (BUSPAR) 15 MG tablet [Pharmacy Med Name: BUSPIRONE 15MG TABLETS] 90 tablet 0     Sig: TAKE 1 TABLET DAILY       Atypical Antidepressants Protocol Passed - 4/29/2019  8:54 AM        Passed - Recent (12 mo) or future (30 days) visit within the authorizing provider's specialty     Patient had office visit in the last 12 months or has a visit in the next 30 days with authorizing provider or within the authorizing provider's specialty.  See \"Patient Info\" tab in inbasket, or \"Choose Columns\" in Meds & Orders section of the refill encounter.              Passed - Medication active on med list        Passed - Patient is age 18 or older        Passed - No active pregnancy on record        Passed - No positive pregnancy test in past 12 mos        "

## 2019-04-30 RX ORDER — BUSPIRONE HYDROCHLORIDE 15 MG/1
TABLET ORAL
Qty: 90 TABLET | Refills: 1 | Status: SHIPPED | OUTPATIENT
Start: 2019-04-30 | End: 2019-07-08

## 2019-05-31 ENCOUNTER — TRANSFERRED RECORDS (OUTPATIENT)
Dept: HEALTH INFORMATION MANAGEMENT | Facility: CLINIC | Age: 84
End: 2019-05-31

## 2019-06-21 DIAGNOSIS — F41.9 ANXIETY: ICD-10-CM

## 2019-06-21 NOTE — TELEPHONE ENCOUNTER
"Requested Prescriptions   Pending Prescriptions Disp Refills     FLUoxetine (PROZAC) 20 MG capsule [Pharmacy Med Name: FLUOXETINE 20MG CAPSULES] 90 capsule 0     Sig: TAKE 1 CAPSULE BY MOUTH EVERY DAY       SSRIs Protocol Passed - 6/21/2019  9:30 AM        Passed - Recent (12 mo) or future (30 days) visit within the authorizing provider's specialty     Patient had office visit in the last 12 months or has a visit in the next 30 days with authorizing provider or within the authorizing provider's specialty.  See \"Patient Info\" tab in inbasket, or \"Choose Columns\" in Meds & Orders section of the refill encounter.              Passed - Medication is active on med list        Passed - Patient is age 18 or older        Passed - No active pregnancy on record        Passed - No positive pregnancy test in last 12 months          Prescription approved per [facility] Refill Protocol.    Teodora TOBAR, RN, BSN, PHN      "

## 2019-06-28 DIAGNOSIS — F41.9 ANXIETY: ICD-10-CM

## 2019-06-28 NOTE — TELEPHONE ENCOUNTER
Requested Prescriptions   Pending Prescriptions Disp Refills     LORazepam (ATIVAN) 0.5 MG tablet [Pharmacy Med Name: LORAZEPAM 0.5MG TABLETS] 90 tablet 0     Sig: TAKE 1 TABLET BY MOUTH DAILY AS NEEDED       There is no refill protocol information for this order              Last Written Prescription Date:  3/26/2019  Last Fill Quantity: 90,   # refills: 0  Last Office Visit: 10/9/2018  Future Office visit:       Routing refill request to provider for review/approval because:  Drug not on the [facility], P or [facility] refill protocol or controlled substance

## 2019-07-01 NOTE — TELEPHONE ENCOUNTER
Patient checking status of refill request. Would like addressed prior to holiday. Only has 1 pill left.      Teodora TOBAR RN, BSN, PHN

## 2019-07-01 NOTE — TELEPHONE ENCOUNTER
With use of controlled substance (Lorazepam for anxity), needs to be seen every 6 mos. Last letter sent October 2018 asked pt to see me in 6 mos. NO appts made. Please call pt and get appt scheduled in the ne xt 30 days to f/u on anxiety management. After appt scheduled, then route RF request back to me to address and will refill med for 30 days to cover until seen back in clinic.

## 2019-07-02 RX ORDER — LORAZEPAM 0.5 MG/1
TABLET ORAL
Qty: 90 TABLET | Refills: 0 | Status: SHIPPED | OUTPATIENT
Start: 2019-07-02 | End: 2019-09-11

## 2019-07-02 NOTE — TELEPHONE ENCOUNTER
Scheduled appointment noted.  Prescription approved and paper copy and [place] basket.  Please fax to pharmacy and inform patient

## 2019-07-08 ENCOUNTER — OFFICE VISIT (OUTPATIENT)
Dept: INTERNAL MEDICINE | Facility: CLINIC | Age: 84
End: 2019-07-08
Payer: COMMERCIAL

## 2019-07-08 VITALS
TEMPERATURE: 98.2 F | DIASTOLIC BLOOD PRESSURE: 70 MMHG | HEART RATE: 88 BPM | WEIGHT: 133 LBS | SYSTOLIC BLOOD PRESSURE: 124 MMHG | RESPIRATION RATE: 16 BRPM | OXYGEN SATURATION: 97 % | BODY MASS INDEX: 28.28 KG/M2

## 2019-07-08 DIAGNOSIS — F41.9 ANXIETY: ICD-10-CM

## 2019-07-08 DIAGNOSIS — I10 ESSENTIAL HYPERTENSION, BENIGN: ICD-10-CM

## 2019-07-08 DIAGNOSIS — M85.80 OSTEOPENIA, UNSPECIFIED LOCATION: ICD-10-CM

## 2019-07-08 DIAGNOSIS — Z13.6 CARDIOVASCULAR SCREENING; LDL GOAL LESS THAN 130: ICD-10-CM

## 2019-07-08 LAB
ANION GAP SERPL CALCULATED.3IONS-SCNC: 6 MMOL/L (ref 3–14)
BUN SERPL-MCNC: 11 MG/DL (ref 7–30)
CALCIUM SERPL-MCNC: 9.1 MG/DL (ref 8.5–10.1)
CHLORIDE SERPL-SCNC: 102 MMOL/L (ref 94–109)
CHOLEST SERPL-MCNC: 193 MG/DL
CO2 SERPL-SCNC: 30 MMOL/L (ref 20–32)
CREAT SERPL-MCNC: 0.56 MG/DL (ref 0.52–1.04)
GFR SERPL CREATININE-BSD FRML MDRD: 87 ML/MIN/{1.73_M2}
GLUCOSE SERPL-MCNC: 96 MG/DL (ref 70–99)
HDLC SERPL-MCNC: 84 MG/DL
LDLC SERPL CALC-MCNC: 99 MG/DL
NONHDLC SERPL-MCNC: 109 MG/DL
POTASSIUM SERPL-SCNC: 3.6 MMOL/L (ref 3.4–5.3)
SODIUM SERPL-SCNC: 138 MMOL/L (ref 133–144)
TRIGL SERPL-MCNC: 52 MG/DL

## 2019-07-08 PROCEDURE — 80048 BASIC METABOLIC PNL TOTAL CA: CPT | Performed by: INTERNAL MEDICINE

## 2019-07-08 PROCEDURE — 99214 OFFICE O/P EST MOD 30 MIN: CPT | Performed by: INTERNAL MEDICINE

## 2019-07-08 PROCEDURE — 36415 COLL VENOUS BLD VENIPUNCTURE: CPT | Performed by: INTERNAL MEDICINE

## 2019-07-08 PROCEDURE — 80061 LIPID PANEL: CPT | Performed by: INTERNAL MEDICINE

## 2019-07-08 RX ORDER — BUSPIRONE HYDROCHLORIDE 15 MG/1
TABLET ORAL
Qty: 90 TABLET | Refills: 3 | Status: SHIPPED | OUTPATIENT
Start: 2019-07-08 | End: 2020-09-10

## 2019-07-08 RX ORDER — IRBESARTAN AND HYDROCHLOROTHIAZIDE 150; 12.5 MG/1; MG/1
1 TABLET, FILM COATED ORAL DAILY
Qty: 90 TABLET | Refills: 3 | Status: SHIPPED | OUTPATIENT
Start: 2019-07-08 | End: 2020-07-07

## 2019-07-08 RX ORDER — ALENDRONATE SODIUM 70 MG/1
70 TABLET ORAL
Qty: 12 TABLET | Refills: 3 | Status: SHIPPED | OUTPATIENT
Start: 2019-07-08 | End: 2021-08-30

## 2019-07-08 RX ORDER — FLUTICASONE PROPIONATE 50 MCG
1-2 SPRAY, SUSPENSION (ML) NASAL DAILY
Status: CANCELLED | OUTPATIENT
Start: 2019-07-08

## 2019-07-08 RX ORDER — LORAZEPAM 0.5 MG/1
TABLET ORAL
Qty: 90 TABLET | Refills: 0 | Status: CANCELLED | OUTPATIENT
Start: 2019-07-08

## 2019-07-08 ASSESSMENT — ANXIETY QUESTIONNAIRES
7. FEELING AFRAID AS IF SOMETHING AWFUL MIGHT HAPPEN: NOT AT ALL
GAD7 TOTAL SCORE: 0
6. BECOMING EASILY ANNOYED OR IRRITABLE: NOT AT ALL
1. FEELING NERVOUS, ANXIOUS, OR ON EDGE: NOT AT ALL
3. WORRYING TOO MUCH ABOUT DIFFERENT THINGS: NOT AT ALL
5. BEING SO RESTLESS THAT IT IS HARD TO SIT STILL: NOT AT ALL
IF YOU CHECKED OFF ANY PROBLEMS ON THIS QUESTIONNAIRE, HOW DIFFICULT HAVE THESE PROBLEMS MADE IT FOR YOU TO DO YOUR WORK, TAKE CARE OF THINGS AT HOME, OR GET ALONG WITH OTHER PEOPLE: NOT DIFFICULT AT ALL
2. NOT BEING ABLE TO STOP OR CONTROL WORRYING: NOT AT ALL

## 2019-07-08 ASSESSMENT — PATIENT HEALTH QUESTIONNAIRE - PHQ9
5. POOR APPETITE OR OVEREATING: NOT AT ALL
SUM OF ALL RESPONSES TO PHQ QUESTIONS 1-9: 3

## 2019-07-08 NOTE — PROGRESS NOTES
Subjective     Kathy Braswell is a 84 year old female who presents to clinic today for the following health issues:    HPI   Hypertension Follow-up      Do you check your blood pressure regularly outside of the clinic? Yes     Are you following a low salt diet? No    Are your blood pressures ever more than 140 on the top number (systolic) OR more   than 90 on the bottom number (diastolic), for example 140/90? No  Depression and Anxiety Follow-Up    How are you doing with your depression since your last visit? No change    How are you doing with your anxiety since your last visit?  No change    Are you having other symptoms that might be associated with depression or anxiety? No    Have you had a significant life event? No     Do you have any concerns with your use of alcohol or other drugs? No    Social History     Tobacco Use     Smoking status: Never Smoker     Smokeless tobacco: Never Used   Substance Use Topics     Alcohol use: Yes     Alcohol/week: 0.0 oz     Comment: seldom     Drug use: No     PHQ 3/8/2018 10/9/2018 7/8/2019   PHQ-9 Total Score 3 3 3   Q9: Thoughts of better off dead/self-harm past 2 weeks Not at all Not at all Not at all     JUANPABLO-7 SCORE 3/8/2018 10/9/2018 7/8/2019   Total Score - - -   Total Score 3 1 0     PHQ-9 SCORE 3/8/2018 10/9/2018 7/8/2019   PHQ-9 Total Score - - -   PHQ-9 Total Score 3 3 3     JUANPABLO-7 SCORE 3/8/2018 10/9/2018 7/8/2019   Total Score - - -   Total Score 3 1 0       Amount of exercise or physical activity: Minimal     Problems taking medications regularly: No    Medication side effects: none    Diet: regular (no restrictions)    Pt's past medical history, family history, habits, medications and allergies were reviewed with the patient today.  See snap shot for  HCM status. Most recent lab results reviewed with pt. Problem list and histories reviewed & adjusted, as indicated.  Additional history as below:    Denies chest pain, shortness of breath, abdominal pain, headache,  "vision changes or side effects with medications.   Minimal fatigue. Has nocturia x2-3. Has 3 cups coffee/day.  Does not wish to consider medication for nocturia and does not wish to decrease her coffee intake at this time.  Last couple coffee is about 3 PM.  Patient states that she enjoys her coffee more than being bothered by getting up at night to urinate  Anxiety control as above     Additional ROS:   Constitutional, HEENT, Cardiovascular, Pulmonary, GI and , Neuro, MSK and Psych review of systems/symptoms are otherwise negative or unchanged from previous, except as noted above.      OBJECTIVE:  /70   Pulse 88   Temp 98.2  F (36.8  C) (Oral)   Resp 16   Wt 60.3 kg (133 lb)   SpO2 97%   BMI 28.28 kg/m     Estimated body mass index is 28.28 kg/m  as calculated from the following:    Height as of 4/15/18: 1.461 m (4' 9.5\").    Weight as of this encounter: 60.3 kg (133 lb).     Neck: no adenopathy. Thyroid normal to palpation. No bruits  Pulm: Lungs clear to auscultation   CV: Regular rates and rhythm  GI: Soft, nontender, Normal active bowel sounds, No hepatosplenomegaly or masses palpable  Ext: Peripheral pulses intact. No edema.  Gen: Calm affect, pleasant. Alert    Assessment/Plan: (See plan discussion below for further details)  1. Anxiety  Well-controlled.  Chronic use of lorazepam and not requiring more than once a day.  Not having side effects with the medication.  We will therefore continue current treatment and reassess in 6 months  - FLUoxetine (PROZAC) 20 MG capsule; TAKE 1 CAPSULE BY MOUTH EVERY DAY  Dispense: 90 capsule; Refill: 3  - busPIRone (BUSPAR) 15 MG tablet; TAKE 1 TABLET DAILY  Dispense: 90 tablet; Refill: 3    2. Essential hypertension, benign  Controlled.  Continue current medication.  Labs ordered  - irbesartan-hydrochlorothiazide (AVALIDE) 150-12.5 MG tablet; Take 1 tablet by mouth daily  Dispense: 90 tablet; Refill: 3  - Basic metabolic panel    3. Osteopenia, unspecified " location  Patient failed previous drug holiday trial.  No back on Fosamax and will repeat DEXA 2021  - alendronate (FOSAMAX) 70 MG tablet; Take 1 tablet (70 mg) by mouth every 7 days Take with over 8 ounces water and stay upright for at least 30 minutes after dose.  Take at least 60 minutes before breakfast  Dispense: 12 tablet; Refill: 3    4. CARDIOVASCULAR SCREENING; LDL GOAL LESS THAN 130  - Lipid panel reflex to direct LDL Fasting    Plan discussion:  Continue current meds  Prescriptions refilled.    Labs today as ordered  Follow up in 6 months. Earlier as needed  Patient will contact me in 3 months when due for lorazepam refill and will be addressed at that time since just recently refilled       Rio Ospina MD  Internal Medicine Department  [facility]    (Chart documentation was completed, in part, with [employer] voice-recognition software. Even though reviewed, some grammatical, spelling, and word errors may remain.)

## 2019-07-08 NOTE — LETTER
[facility]  [address] [address]  [phone]      7/11/2019       Kathy Braswell  [address]  [facility] [address]        Dear Kathy,  Here are your most recent lab results. Unless commented on below, mild variation of results  outside the normal range are not clinically signicant.    Resulted Orders   Basic metabolic panel   Result Value Ref Range    Sodium 138 133 - 144 mmol/L    Potassium 3.6 3.4 - 5.3 mmol/L    Chloride 102 94 - 109 mmol/L    Carbon Dioxide 30 20 - 32 mmol/L    Anion Gap 6 3 - 14 mmol/L    Glucose 96 70 - 99 mg/dL    Urea Nitrogen 11 7 - 30 mg/dL    Creatinine 0.56 0.52 - 1.04 mg/dL    GFR Estimate 87 >60 mL/min/[1.73_m2]      Comment:      Non  GFR Calc  Starting 12/18/2018, serum creatinine based estimated GFR (eGFR) will be   calculated using the Chronic Kidney Disease Epidemiology Collaboration   (CKD-EPI) equation.      GFR Estimate If Black >90 >60 mL/min/[1.73_m2]      Comment:       GFR Calc  Starting 12/18/2018, serum creatinine based estimated GFR (eGFR) will be   calculated using the Chronic Kidney Disease Epidemiology Collaboration   (CKD-EPI) equation.      Calcium 9.1 8.5 - 10.1 mg/dL   Lipid panel reflex to direct LDL Fasting   Result Value Ref Range    Cholesterol 193 <200 mg/dL    Triglycerides 52 <150 mg/dL    HDL Cholesterol 84 >49 mg/dL    LDL Cholesterol Calculated 99 <100 mg/dL      Comment:      Desirable:       <100 mg/dl    Non HDL Cholesterol 109 <130 mg/dL       Total Cholesterol, HDL (good) Cholesterol, LDL (bad) Cholesterol, Non-HDL (bad) cholesterol, Triglycerides, Electrolyte, Glucose/Sugar and Kidney function lab results were normal.  Continue current medication.  Please contact your pharmacy if you need further refills of your medication.  Please make an appointment to see me in 6 months for follow-up regarding your anxiety and blood pressure management or earlier as  needed    If you have further questions/concerns regarding the results, I would ask that you bring them to your next follow-up appointment with me and I would be happy to review them with you further.      Sincerely,      Rio Ospina MD  Internal Medicine

## 2019-07-09 ASSESSMENT — ANXIETY QUESTIONNAIRES: GAD7 TOTAL SCORE: 0

## 2019-09-04 ENCOUNTER — TRANSFERRED RECORDS (OUTPATIENT)
Dept: HEALTH INFORMATION MANAGEMENT | Facility: CLINIC | Age: 84
End: 2019-09-04

## 2019-09-06 DIAGNOSIS — F41.9 ANXIETY: ICD-10-CM

## 2019-09-06 NOTE — TELEPHONE ENCOUNTER
"Requested Prescriptions   Pending Prescriptions Disp Refills     FLUoxetine (PROZAC) 20 MG capsule [Pharmacy Med Name: FLUOXETINE 20MG CAPSULES] 90 capsule 0     Sig: TAKE 1 CAPSULE BY MOUTH EVERY DAY   Last Written Prescription Date:  7/8/2019  Last Fill Quantity: 90,  # refills: 3   Last Office Visit: 7/8/2019   Future Office Visit:         SSRIs Protocol Passed - 9/6/2019  9:28 AM        Passed - Recent (12 mo) or future (30 days) visit within the authorizing provider's specialty     Patient had office visit in the last 12 months or has a visit in the next 30 days with authorizing provider or within the authorizing provider's specialty.  See \"Patient Info\" tab in inbasket, or \"Choose Columns\" in Meds & Orders section of the refill encounter.              Passed - Medication is active on med list        Passed - Patient is age 18 or older        Passed - No active pregnancy on record        Passed - No positive pregnancy test in last 12 months          "

## 2019-09-08 DIAGNOSIS — F41.9 ANXIETY: ICD-10-CM

## 2019-09-08 NOTE — TELEPHONE ENCOUNTER
Requested Prescriptions   Pending Prescriptions Disp Refills     LORazepam (ATIVAN) 0.5 MG tablet [Pharmacy Med Name: LORAZEPAM 0.5MG TABLETS] 90 tablet 0     Sig: TAKE 1 TABLET BY MOUTH EVERY DAY AS NEEDED       There is no refill protocol information for this order              Last Written Prescription Date:  7/2/2019  Last Fill Quantity: 90,   # refills: 0  Last Office Visit: 7/8/2019  Future Office visit:       Routing refill request to provider for review/approval because:  Drug not on the G, P or [facility] refill protocol or controlled substance

## 2019-09-10 DIAGNOSIS — F41.9 ANXIETY: ICD-10-CM

## 2019-09-10 NOTE — TELEPHONE ENCOUNTER
Requested Prescriptions   Pending Prescriptions Disp Refills     LORazepam (ATIVAN) 0.5 MG tablet 90 tablet 0     Sig: TAKE 1 TABLET BY MOUTH DAILY AS NEEDED       There is no refill protocol information for this order      Controlled Substance Refill Request for lorazepam  Problem List Complete:  Yes    Last Written Prescription Date:  07/02/19  Last Fill Quantity: 90,   # refills: 0    THE MOST RECENT OFFICE VISIT MUST BE WITHIN THE PAST 3 MONTHS. AT LEAST ONE FACE TO FACE VISIT MUST OCCUR EVERY 6 MONTHS. ADDITIONAL VISITS CAN BE VIRTUAL.  (THIS STATEMENT SHOULD BE DELETED.)    [facility] Office Visit with [facility] care provider: 07/08/19    Future Office visit:     Controlled substance agreement:   Encounter-Level CSA:    There are no encounter-level csa.     Patient-Level CSA:    There are no patient-level csa.         Last Urine Drug Screen: No results found for: CDAUT, No results found for: COMDAT, No results found for: THC13, PCP13, COC13, MAMP13, OPI13, AMP13, BZO13, TCA13, MTD13, BAR13, OXY13, PPX13, BUP13     Processing:  Fax Rx to [facility] [address] [place] [address] pharmacy    [email or web address]   checked in past 3 months?  No, route to RN

## 2019-09-11 RX ORDER — LORAZEPAM 0.5 MG/1
TABLET ORAL
Qty: 90 TABLET | Refills: 0 | OUTPATIENT
Start: 2019-09-11

## 2019-09-11 RX ORDER — LORAZEPAM 0.5 MG/1
TABLET ORAL
Qty: 90 TABLET | Refills: 0 | Status: SHIPPED | OUTPATIENT
Start: 2019-09-11 | End: 2019-11-25

## 2019-09-11 NOTE — TELEPHONE ENCOUNTER
Pt still needs med. She would like another provider to look at it, please. Could Dr. Delgado please refill this? Pt has called multiple times.

## 2019-10-28 DIAGNOSIS — G47.00 INSOMNIA, UNSPECIFIED TYPE: ICD-10-CM

## 2019-10-28 NOTE — TELEPHONE ENCOUNTER
ambien      Last Written Prescription Date:  1/31/19  Last Fill Quantity: 90,   # refills: 1  Last Office Visit: 7/8/19  Future Office visit:    Next 5 appointments (look out 90 days)    Oct 29, 2019  3:00 PM CDT  Nurse Only with [facility]  [facility] [address] [id number] [phone]           Routing refill request to provider for review/approval because:  Drug not on the [facility], [facility] or [facility] refill protocol or controlled substance

## 2019-10-29 RX ORDER — ZOLPIDEM TARTRATE 6.25 MG/1
TABLET, FILM COATED, EXTENDED RELEASE ORAL
Qty: 90 TABLET | Refills: 0 | Status: SHIPPED | OUTPATIENT
Start: 2019-10-29 | End: 2020-07-07

## 2019-10-30 ENCOUNTER — TELEPHONE (OUTPATIENT)
Dept: INTERNAL MEDICINE | Facility: CLINIC | Age: 84
End: 2019-10-30

## 2019-10-30 NOTE — TELEPHONE ENCOUNTER
Med refilled for 90 days. Pt to f/u with me in January 2020 for 6 mos review of anxiety and  other medical issues. Inform pt. If pt wishes, may scheduled future appt

## 2019-10-30 NOTE — TELEPHONE ENCOUNTER
Prior Authorization Retail Medication Request    Medication/Dose: zolpidem ER (AMBIEN CR) 6.25 MG CR tablet  ICD code (if different than what is on RX):  G47.00  Previously Tried and Failed:    Rationale:      Insurance Name:  [employer]  Insurance ID:  [id number]  Key: [id number]      Pharmacy Information (if different than what is on RX)  Name:  [facility]  Phone:  [phone]

## 2019-11-01 NOTE — TELEPHONE ENCOUNTER
Central Prior Authorization Team   Phone: [phone]      Prior Authorization Approval    Authorization Effective Date: 8/3/2019  Authorization Expiration Date: 10/31/2020  Medication: zolpidem ER (AMBIEN CR) 6.25 MG CR tablet - APPROVED  Approved Dose/Quantity: 90 FOR 90  Reference #:     Insurance Company: [employer] - Phone [phone] Fax [phone]  Expected CoPay:       CoPay Card Available:      Foundation Assistance Needed:    Which Pharmacy is filling the prescription (Not needed for infusion/clinic administered): [id number] - [place], [place] - [address]  Pharmacy Notified: Yes  Patient Notified: Yes (**Instructed pharmacy to notify patient when script is ready to /ship.**)

## 2019-11-25 DIAGNOSIS — F41.9 ANXIETY: ICD-10-CM

## 2019-11-25 NOTE — TELEPHONE ENCOUNTER
lorazepam      Last Written Prescription Date:  9/11/19  Last Fill Quantity: 90,   # refills: 0  Last Office Visit: 7/8/19  Future Office visit:       Routing refill request to provider for review/approval because:  Drug not on the G, P or [facility] refill protocol or controlled substance

## 2019-11-26 RX ORDER — LORAZEPAM 0.5 MG/1
TABLET ORAL
Qty: 30 TABLET | Refills: 0 | Status: SHIPPED | OUTPATIENT
Start: 2019-11-26 | End: 2020-01-03

## 2019-11-26 NOTE — TELEPHONE ENCOUNTER
Patient said she may have taken 2 a day, she doesn't know.  She is all out of medication and is hoping she can get refill today.

## 2019-11-26 NOTE — TELEPHONE ENCOUNTER
Med is written to use one tab daily as needed. Was last refilled 9/10/19. Therefore refill request too early. Should not need refill request for another 2 weeks. Call pt and find out why refill being requested now

## 2019-11-26 NOTE — TELEPHONE ENCOUNTER
Pt has been using  Lorazepam more frequently than prescribed. RF done for #30 tabs and pt NOT to use more than once a day. Pt will have to see me back in clinic In the next 30 days to review anxiety control further and to sign controlled substance agreement. Will not refill med early again if using more than prescribed and will not consider refill again without appt with me in clinic. Assist with scheduling f/u appt

## 2019-11-27 ENCOUNTER — TELEPHONE (OUTPATIENT)
Dept: INTERNAL MEDICINE | Facility: CLINIC | Age: 84
End: 2019-11-27

## 2019-11-27 DIAGNOSIS — F41.9 ANXIETY: ICD-10-CM

## 2019-11-27 RX ORDER — LORAZEPAM 0.5 MG/1
TABLET ORAL
Qty: 30 TABLET | Refills: 0 | Status: CANCELLED | OUTPATIENT
Start: 2019-11-27

## 2019-11-27 NOTE — TELEPHONE ENCOUNTER
Pt calling again.  Has called numerous times since yesterday.  Wants something done today.  Is there anything pcp can do for pt from now till 12/7?

## 2019-11-27 NOTE — TELEPHONE ENCOUNTER
Pt is completely out of medication and would like a refill ASAP or Partial refill to get through Thanksgiving holiday. Pt would like it today.

## 2019-11-27 NOTE — TELEPHONE ENCOUNTER
Pt called regarding her prescription for Ativan. PCP sent over new Rx 11/26/19, when writer contacted pharmacist he stated the pt cannot p/u Rx until Dec.7th.   Pt reports she is out of medication.   Christie Nunes RN

## 2019-11-28 NOTE — TELEPHONE ENCOUNTER
I sent a new Rx  yesterday but  Since pt  used medication more frequently than she was instructed and I prescribed, if pharmacy saying not filling until Dce, that is an insurance issue. Pt will have to likely pay for new Rx I sent yesterday out of pocket to cover until December date

## 2019-11-29 NOTE — TELEPHONE ENCOUNTER
Triage spoke with pharmacy on 11/27/2019. Medication was ok to be filled. They will contact patient to inform her when it is ready.    Teodora BARRAGANN, RN, PHN

## 2019-12-30 DIAGNOSIS — F41.9 ANXIETY: ICD-10-CM

## 2019-12-30 NOTE — TELEPHONE ENCOUNTER
Requested Prescriptions   Pending Prescriptions Disp Refills     LORazepam (ATIVAN) 0.5 MG tablet [Pharmacy Med Name: LORAZEPAM 0.5MG TABLETS] 30 tablet      Sig: TAKE 1 TABLET BY MOUTH DAILY AS NEEDED       There is no refill protocol information for this order        Last Written Prescription Date:  11/26/19  Last Fill Quantity: 30,  # refills: 0   Last office visit: 7/8/2019 with prescribing provider:  7/8/19   Future Office Visit:

## 2019-12-31 NOTE — TELEPHONE ENCOUNTER
Routing refill request to provider for review/approval because:  Drug not on the [facility] refill protocol     Teodora BARRAGANN, RN, PHN

## 2020-01-03 RX ORDER — LORAZEPAM 0.5 MG/1
TABLET ORAL
Qty: 30 TABLET | Refills: 0 | Status: SHIPPED | OUTPATIENT
Start: 2020-01-03 | End: 2020-01-09

## 2020-01-03 NOTE — TELEPHONE ENCOUNTER
Pt last seen 6 mos ago. Was instructed  In late November that needed f/u appt with me but no appt made. Also needs to sign controlled substance agreement. Call pt and get appt set up with me in clinic in the next 30 days. After appt made, then may route refill request back to me to address

## 2020-01-09 ENCOUNTER — OFFICE VISIT (OUTPATIENT)
Dept: INTERNAL MEDICINE | Facility: CLINIC | Age: 85
End: 2020-01-09
Payer: COMMERCIAL

## 2020-01-09 VITALS
HEIGHT: 56 IN | HEART RATE: 88 BPM | RESPIRATION RATE: 16 BRPM | OXYGEN SATURATION: 98 % | BODY MASS INDEX: 30.82 KG/M2 | WEIGHT: 137 LBS | TEMPERATURE: 98.6 F | SYSTOLIC BLOOD PRESSURE: 118 MMHG | DIASTOLIC BLOOD PRESSURE: 78 MMHG

## 2020-01-09 DIAGNOSIS — F41.9 ANXIETY: ICD-10-CM

## 2020-01-09 DIAGNOSIS — R53.83 OTHER FATIGUE: ICD-10-CM

## 2020-01-09 DIAGNOSIS — F32.0 MILD MAJOR DEPRESSION (H): ICD-10-CM

## 2020-01-09 DIAGNOSIS — I10 ESSENTIAL HYPERTENSION, BENIGN: ICD-10-CM

## 2020-01-09 DIAGNOSIS — M85.80 OSTEOPENIA, UNSPECIFIED LOCATION: ICD-10-CM

## 2020-01-09 LAB
ANION GAP SERPL CALCULATED.3IONS-SCNC: 6 MMOL/L (ref 3–14)
BUN SERPL-MCNC: 12 MG/DL (ref 7–30)
CALCIUM SERPL-MCNC: 9.2 MG/DL (ref 8.5–10.1)
CHLORIDE SERPL-SCNC: 101 MMOL/L (ref 94–109)
CO2 SERPL-SCNC: 30 MMOL/L (ref 20–32)
CREAT SERPL-MCNC: 0.59 MG/DL (ref 0.52–1.04)
GFR SERPL CREATININE-BSD FRML MDRD: 85 ML/MIN/{1.73_M2}
GLUCOSE SERPL-MCNC: 97 MG/DL (ref 70–99)
HGB BLD-MCNC: 14.2 G/DL (ref 11.7–15.7)
POTASSIUM SERPL-SCNC: 3.6 MMOL/L (ref 3.4–5.3)
SODIUM SERPL-SCNC: 137 MMOL/L (ref 133–144)
TSH SERPL DL<=0.005 MIU/L-ACNC: 1.96 MU/L (ref 0.4–4)

## 2020-01-09 PROCEDURE — 84443 ASSAY THYROID STIM HORMONE: CPT | Performed by: INTERNAL MEDICINE

## 2020-01-09 PROCEDURE — 36415 COLL VENOUS BLD VENIPUNCTURE: CPT | Performed by: INTERNAL MEDICINE

## 2020-01-09 PROCEDURE — 99214 OFFICE O/P EST MOD 30 MIN: CPT | Performed by: INTERNAL MEDICINE

## 2020-01-09 PROCEDURE — 80048 BASIC METABOLIC PNL TOTAL CA: CPT | Performed by: INTERNAL MEDICINE

## 2020-01-09 PROCEDURE — 85018 HEMOGLOBIN: CPT | Performed by: INTERNAL MEDICINE

## 2020-01-09 RX ORDER — LORAZEPAM 0.5 MG/1
TABLET ORAL
Qty: 30 TABLET | Refills: 4 | Status: SHIPPED | OUTPATIENT
Start: 2020-01-09 | End: 2020-07-16

## 2020-01-09 ASSESSMENT — MIFFLIN-ST. JEOR: SCORE: 931.49

## 2020-01-09 ASSESSMENT — ANXIETY QUESTIONNAIRES
3. WORRYING TOO MUCH ABOUT DIFFERENT THINGS: NOT AT ALL
2. NOT BEING ABLE TO STOP OR CONTROL WORRYING: NOT AT ALL
5. BEING SO RESTLESS THAT IT IS HARD TO SIT STILL: NOT AT ALL
IF YOU CHECKED OFF ANY PROBLEMS ON THIS QUESTIONNAIRE, HOW DIFFICULT HAVE THESE PROBLEMS MADE IT FOR YOU TO DO YOUR WORK, TAKE CARE OF THINGS AT HOME, OR GET ALONG WITH OTHER PEOPLE: NOT DIFFICULT AT ALL
6. BECOMING EASILY ANNOYED OR IRRITABLE: SEVERAL DAYS
7. FEELING AFRAID AS IF SOMETHING AWFUL MIGHT HAPPEN: NOT AT ALL
1. FEELING NERVOUS, ANXIOUS, OR ON EDGE: SEVERAL DAYS
GAD7 TOTAL SCORE: 2

## 2020-01-09 ASSESSMENT — PATIENT HEALTH QUESTIONNAIRE - PHQ9
5. POOR APPETITE OR OVEREATING: NOT AT ALL
SUM OF ALL RESPONSES TO PHQ QUESTIONS 1-9: 2

## 2020-01-09 NOTE — LETTER
[facility]  01/09/20    Patient: Kathy Braswell  YOB: 1935  Medical Record Number: [id number]  CSN: [id number]                                                                              Non-opioid Controlled Substance Agreement    I understand that my care provider has prescribed a controlled substance to help manage my condition(s). I am taking this medicine to help me function or work. I know this is strong medicine, and that it can cause serious side effects. Controlled substances can be sedating, addicting and may cause a dependency on the drug. They can affect my ability to drive or think, and cause depression. They need to be taken exactly as prescribed. Combining controlled substances with certain medicines or chemicals (such as cocaine, sedatives and tranquilizers, sleeping pills, meth) can be dangerous or even fatal. Also, if I stop controlled substances suddenly, I may have severe withdrawal symptoms.  If not helpful, I may be asked to stop them.    The risks, benefits, and side effects of these medicine(s) were explained to me. I agree that:    1. I will take part in other treatments as advised by my care team. This may be psychiatry or counseling, physical therapy, behavioral therapy, group treatment or a referral to a pain clinic. I will reduce or stop my medicine when my care team tells me to do so.  2. I will take my medicines as prescribed. I will not change the dose or schedule unless my care team tells me to. There will be no refills if I  run out early.   I may be contactedwithout warning and asked to complete a urine drug test or pill count at any time.   3. I will keep all my appointments, and understand this is part of the monitoring of controlled substances. My care team may require an office visit for EVERY controlled substance refill. If I miss appointments or don t follow instructions, my care team may stop my medicine.  4. I will not ask other  providers to prescribe controlled substances, and I will not accept controlled substances from other people. If I need another prescribed controlled substance for a new reason, I will tell my care team within 1 business day.  5. I will use one pharmacy to fill all of my controlled substance prescriptions, and it is up to me to make sure that I do not run out of my medicines on weekends or holidays. If my care team is willing to refill my controlled substance prescription without a visit, I must request refills only during office hours, refills may take up to 3 days to process, and it may take up to 5 to 7 days for my medicine to be mailed and ready at my pharmacy. Prescriptions will not be mailed anywhere except my pharmacy.    6. I am responsible for my prescriptions. If the medicine/prescription is lost or stolen, it will not be replaced. I also agree not to share controlled substance medicines with anyone.              [facility]  01/09/20  Patient:  Kathy Braswell  YOB: 1935  Medical Record Number: [id number]  [facility]: [id number]    7. I agree to not use ANY illegal or recreational drugs. This includes marijuana, cocaine, bath salts or other drugs. I agree not to use alcohol unless my care team says I may. I agree to give urine samples whenever asked. If I don t give a urine sample, the care team may stop my medicine.    8. If I enroll in the [place] Medical Marijuana program, I will tell my care team. I will also sign an agreement to share my medical records with my care team.    9. I will bring in my list of medicines (or my medicine bottles) each time I come to the clinic.   10. I will tell my care team right away if I become pregnant or have a new medical problem treated outside of my regular clinic.  11. I understand that this medicine can affect my thinking and judgment. It may be unsafe for me to drive, use machinery and do dangerous tasks. I will not do any of these things  until I know how the medicine affects me. If my dose changes, I will wait to see how it affects me. I will contact my care team if I have concerns about medicine side effects.    I understand that if I do not follow any of the conditions above, my prescriptions or treatment may be stopped.      I agree that my provider, clinic care team, and pharmacy may work with any city, state or federal law enforcement agency that investigates the misuse, sale, or other diversion of my controlled medicine. I will allow my provider to discuss my care with or share a copy of this agreement with any other treating provider, pharmacy or emergency room where I receive care. I agree to give up (waive) any right of privacy or confidentiality with respect to these consents.   I have read this agreement and have asked questions about anything I did not understand.    ____________________________________________________    ____________  ________  Patient signature                                                         Date      Time    ____________________________________________________     ____________  ________  Witness                                                          Date      Time    ____________________________________________________  Provider signature

## 2020-01-09 NOTE — PATIENT INSTRUCTIONS
Continue current meds. Will put Lorazepam refill on file at pharmacy  Labs today as ordered  Follow up in 6 months (beginning of July before running out of medication). Earlier as needed   If labs OK re: causes of fatigue, then recommended calling to make appt with Sleep clinic for possible obstructive sleep apnea as cause of fatigue  Restart CURVES exercise or consider [employer]  Reduce calorie/carbohydrate (sugar, bread, potato, pasta, rice, alcohol etc)  intake in diet.  Increase color on your plate with fruits and vegetables. Increase  frequency of walking or other aerobic exercise as able (goal is daily)  Repeat bone density test again in 1 year

## 2020-01-09 NOTE — LETTER
[facility]  [address] [address]  [phone]      1/9/2020       Kathy Braswell  [address]  [facility] [address]        Dear Kathy,  Here are your most recent lab results. Unless commented on below, mild variation of results  outside the normal range are not clinically signicant.    Resulted Orders   Basic metabolic panel   Result Value Ref Range    Sodium 137 133 - 144 mmol/L    Potassium 3.6 3.4 - 5.3 mmol/L    Chloride 101 94 - 109 mmol/L    Carbon Dioxide 30 20 - 32 mmol/L    Anion Gap 6 3 - 14 mmol/L    Glucose 97 70 - 99 mg/dL    Urea Nitrogen 12 7 - 30 mg/dL    Creatinine 0.59 0.52 - 1.04 mg/dL    GFR Estimate 85 >60 mL/min/[1.73_m2]      Comment:      Non  GFR Calc  Starting 12/18/2018, serum creatinine based estimated GFR (eGFR) will be   calculated using the Chronic Kidney Disease Epidemiology Collaboration   (CKD-EPI) equation.      GFR Estimate If Black >90 >60 mL/min/[1.73_m2]      Comment:       GFR Calc  Starting 12/18/2018, serum creatinine based estimated GFR (eGFR) will be   calculated using the Chronic Kidney Disease Epidemiology Collaboration   (CKD-EPI) equation.      Calcium 9.2 8.5 - 10.1 mg/dL   TSH with free T4 reflex   Result Value Ref Range    TSH 1.96 0.40 - 4.00 mU/L   Hemoglobin   Result Value Ref Range    Hemoglobin 14.2 11.7 - 15.7 g/dL       Electrolyte, Glucose/Sugar, Hemoglobin, Kidney function and Thyroid lab results were normal.  No further recommendations at this time.  Continue management recommendations as discussed at your appointment today    If you have further questions/concerns regarding the results, I would ask that you bring them to your next follow-up appointment with me and I would be happy to review them with you further.      Sincerely,      Rio Ospina MD  Internal Medicine

## 2020-01-09 NOTE — PROGRESS NOTES
Subjective     Kathy Braswell is a 84 year old female who presents to clinic today for the following health issues:    HPI   Anxiety Follow-Up    How are you doing with your anxiety since your last visit? Improved     Are you having other symptoms that might be associated with anxiety? No    Have you had a significant life event? No     Are you feeling depressed? Yes:  When Patient is anxious Depression is Present.     Do you have any concerns with your use of alcohol or other drugs? No    Social History     Tobacco Use     Smoking status: Never Smoker     Smokeless tobacco: Never Used   Substance Use Topics     Alcohol use: Yes     Alcohol/week: 0.0 standard drinks     Comment: seldom     Drug use: No     JUANPABLO-7 SCORE 10/9/2018 7/8/2019 1/9/2020   Total Score - - -   Total Score 1 0 2     PHQ 10/9/2018 7/8/2019 1/9/2020   PHQ-9 Total Score 3 3 2   Q9: Thoughts of better off dead/self-harm past 2 weeks Not at all Not at all Not at all         How many servings of fruits and vegetables do you eat daily?  2-3    On average, how many sweetened beverages do you drink each day (Examples: soda, juice, sweet tea, etc.  Do NOT count diet or artificially sweetened beverages)?   0    How many days per week do you miss taking your medication? 0      Pt's past medical history, family history, habits, medications and allergies were reviewed with the patient today.  See snap shot for  HCM status. Most recent lab results reviewed with pt. Problem list and histories reviewed & adjusted, as indicated.  Additional history as below:    Sleeping OK. Very rare use of Ambien  Will take nap  once a day in afternoon  Has some general fatigue. Not fully refreshed in AM. Sister occ visits and  Has heard occ snoring  Anxiety stable. Using Lorazepam 1 tab daily in AM along with Buspar and Fluoxetine. HAS not tolerated higher dose of Fluoxetine before     PHQ-9 ([employer]) 1/9/2020   1.  Little interest or pleasure in doing things 0   2.  Feeling  "down, depressed, or hopeless 1   3.  Trouble falling or staying asleep, or sleeping too much 0   4.  Feeling tired or having little energy 1   5.  Poor appetite or overeating 0   6.  Feeling bad about yourself 0   7.  Trouble concentrating 0   8.  Moving slowly or restless 0   9.  Suicidal or self-harm thoughts 0   PHQ-9 Total Score 2   Difficulty at work, home, or with people Not difficult at all       JUANPABLO-7   [employer], 2002; Used with Permission) 1/9/2020   1. Feeling nervous, anxious, or on edge 1   2. Not being able to stop or control worrying 0   3. Worrying too much about different things 0   4. Trouble relaxing 0   5. Being so restless that it is hard to sit still 0   6. Becoming easily annoyed or irritable 1   7. Feeling afraid, as if something awful might happen 0   JUANPABLO-7 Total Score 2   If you checked any problems, how difficult have they made it for you to do your work, take care of things at home, or get along with other people? Not difficult at all       Has to use Lorazepam daily in AM. Feels calmer with it  Additional ROS:   Constitutional, HEENT, Cardiovascular, Pulmonary, GI and , Neuro, MSK and Psych review of systems/symptoms are otherwise negative or unchanged from previous, except as noted above.      OBJECTIVE:  /78   Pulse 88   Temp 98.6  F (37  C) (Temporal)   Resp 16   Ht 1.41 m (4' 7.5\")   Wt 62.1 kg (137 lb)   SpO2 98%   BMI 31.27 kg/m     Estimated body mass index is 31.27 kg/m  as calculated from the following:    Height as of this encounter: 1.41 m (4' 7.5\").    Weight as of this encounter: 62.1 kg (137 lb).   HEENT: Slightly narrowed airway with weight  Neck: no adenopathy. Thyroid normal to palpation. No bruits  Pulm: Lungs clear to auscultation   CV: Regular rates and rhythm  GI: Soft, nontender, Normal active bowel sounds, No hepatosplenomegaly or masses palpable  Ext: Peripheral pulses intact. No edema.  Neuro: Normal strength and tone, sensory exam grossly " normal  Gen: Calm affect    Assessment/Plan: (See plan discussion below for further details)  1. Essential hypertension, benign  Controlled with med. Lab as ordered  - Basic metabolic panel    2. Mild major depression (H)  Controlled with med    3. Anxiety  Controlled with med    4. Other fatigue   Present before takes AM meds so doubt related to side effects. Possible ARRON. Labs as ordered. If   - SLEEP EVALUATION & MANAGEMENT REFERRAL - ADULT -[facility] [place] [phone]  (Age 18 and up); Future  - Basic metabolic panel  - TSH with free T4 reflex  - Hemoglobin    5. Osteopenia, unspecified location   Previous Fosamax. On drug holiday now. Due for  Repeat tDEXA in 1 year    Plan discussion:   Continue current meds. Will put Lorazepam refill on file at pharmacy   Labs today as ordered  Follow up in 6 months (beginning of July before running out of medication). Earlier as needed   If labs OK re: causes of fatigue, then recommended calling to make appt with Sleep clinic for possible obstructive sleep apnea as cause of fatigue  Restart CURVES exercise or consider [employer]  Reduce calorie/carbohydrate (sugar, bread, potato, pasta, rice, alcohol etc)  intake in diet.  Increase color on your plate with fruits and vegetables. Increase  frequency of walking or other aerobic exercise as able (goal is daily)  Repeat bone density test again in 1 year         Rio Ospina MD  Internal Medicine Department  [facility]    (Chart documentation was completed, in part, with [employer] voice-recognition software. Even though reviewed, some grammatical, spelling, and word errors may remain.)

## 2020-01-10 ASSESSMENT — ANXIETY QUESTIONNAIRES: GAD7 TOTAL SCORE: 2

## 2020-07-06 DIAGNOSIS — G47.00 INSOMNIA, UNSPECIFIED TYPE: ICD-10-CM

## 2020-07-07 DIAGNOSIS — I10 ESSENTIAL HYPERTENSION, BENIGN: ICD-10-CM

## 2020-07-07 RX ORDER — ZOLPIDEM TARTRATE 6.25 MG/1
TABLET, FILM COATED, EXTENDED RELEASE ORAL
Qty: 90 TABLET | Refills: 0 | Status: SHIPPED | OUTPATIENT
Start: 2020-07-07 | End: 2021-03-11

## 2020-07-07 RX ORDER — IRBESARTAN AND HYDROCHLOROTHIAZIDE 150; 12.5 MG/1; MG/1
1 TABLET, FILM COATED ORAL DAILY
Qty: 90 TABLET | Refills: 1 | Status: SHIPPED | OUTPATIENT
Start: 2020-07-07 | End: 2020-08-17 | Stop reason: ALTCHOICE

## 2020-07-07 NOTE — TELEPHONE ENCOUNTER
ambien      Last Written Prescription Date:  10/29/19  Last Fill Quantity: 90,   # refills: 0  Last Office Visit: 1/9/20  Future Office visit:       Routing refill request to provider for review/approval because:  Drug not on the [facility], P or [facility] refill protocol or controlled substance

## 2020-07-17 ENCOUNTER — TELEPHONE (OUTPATIENT)
Dept: INTERNAL MEDICINE | Facility: CLINIC | Age: 85
End: 2020-07-17

## 2020-07-17 NOTE — TELEPHONE ENCOUNTER
Pt transferred to RN from .    Pt felt very light headed, but did not faint.  Paramedics were called.  Paramedics took EKG, and vitals- these were both normal.  Paramedics offered pt to be transferred to ED, but did not feel that pt needed to.  At this time, pt is standing up and feeling better.    Pt not on thinners. Did not fall, did not hit head.    RN educated pt on times to call 911/go to ED.  Pt states if this happens again she will be checked out in ED.  Additionally made plan for if pt has further concerns over the weekend.    At this time, plan to be seen in clinic if not worsening. Pt plans to keep appt even if she feels better. Will increase water intake and monitor BP over the weekend.

## 2020-07-20 ENCOUNTER — NURSE TRIAGE (OUTPATIENT)
Dept: INTERNAL MEDICINE | Facility: CLINIC | Age: 85
End: 2020-07-20

## 2020-07-20 NOTE — TELEPHONE ENCOUNTER
Nurse Triage SBAR    Situation    : episode of dizziness    Background    : hx of anxiety, sedative use, vertigo    Assessment   : patient reports dizziness not present now. EMT called to patient home last Friday. Patient reports BP and EKG assessment was WNL (Not able to recall BP readings)    Patient reports taking listed medications,  No changes and no missed doses. Patient unable to check vitals over the phone with triage at this time. Feels overall ok with some fatigue. No chest pain. No SOB. No headache. No diarrhea. No vomiting.      (See information below for more triage details.)    Recommendation   : Routing to provider for recommendation on Please advise if patient should make any dose adjustments or lab needs. Ok with placing orders than having patient complete BP check?    Protocol Recommended Disposition: Routine office follow-up appointment         Additional Information    Negative: Shock suspected (e.g., cold/pale/clammy skin, too weak to stand, low BP, rapid pulse)    Negative: Difficult to awaken or acting confused (e.g., disoriented, slurred speech)    Negative: Fainted, and still feels dizzy afterwards    Negative: Severe difficulty breathing (e.g., struggling for each breath, speaks in single words)    Negative: Overdose (accidental or intentional) of medications    Negative: New neurologic deficit that is present now: * Weakness of the face, arm, or leg on one side of the body * Numbness of the face, arm, or leg on one side of the body * Loss of speech or garbled speech    Negative: Heart beating < 50 beats per minute OR > 140 beats per minute    Negative: Sounds like a life-threatening emergency to the triager    Negative: Chest pain    Negative: Rectal bleeding, bloody stool, or tarry-black stool    Negative: Vomiting is the main symptom    Negative: Diarrhea is the main symptom    Negative: Headache is the main symptom    Negative: Heat exhaustion suspected (i.e., dehydration from heat  "exposure)    Negative: Patient states that he/she is having an anxiety/panic attack    Negative: SEVERE dizziness (e.g., unable to stand, requires support to walk, feels like passing out now)    Negative: SEVERE headache or neck pain    Negative: Spinning or tilting sensation (vertigo) present now and one or more stroke risk factors (i.e., hypertension, diabetes, prior stroke/TIA, heart attack, age over 60) (Exception: prior physician evaluation for this AND no different/worse than usual)    Negative: Loss of vision or double vision    Negative: Extra heart beats OR irregular heart beating (i.e., 'palpitations')    Negative: Difficulty breathing    Negative: Drinking very little and has signs of dehydration (e.g., no urine > 12 hours, very dry mouth, very lightheaded)    Negative: Follows bleeding (e.g., stomach, rectum, vagina) (Exception: became dizzy from sight of small amount blood)    Negative: Patient sounds very sick or weak to the triager    Negative: Lightheadedness (dizziness) present now, after 2 hours of rest and fluids    Negative: Spinning or tilting sensation (vertigo) present now    Negative: Fever > 103 F (39.4 C)    Negative: Fever > 100.0 F (37.8 C) and has diabetes mellitus or a weak immune system (e.g., HIV positive, cancer chemotherapy, organ transplant, splenectomy, chronic steroids)    Negative: Vomiting occurs with dizziness    Negative: Patient wants to be seen    Taking a medicine that could cause dizziness (e.g., blood pressure medications, diuretics)    Dizziness not present now, but is a chronic symptom (recurrent or ongoing AND lasting > 4 weeks)    Negative: Diabetes    Answer Assessment - Initial Assessment Questions  1. DESCRIPTION: \"Describe your dizziness.\"      No dizziness  2. LIGHTHEADED: \"Do you feel lightheaded?\" (e.g., somewhat faint, woozy, weak upon standing)      Today more fatigued. No really lightheaded.  3. VERTIGO: \"Do you feel like either you or the room is spinning " "or tilting?\" (i.e. vertigo)      Not sure. Has had this in the past.  4. SEVERITY: \"How bad is it?\"  \"Do you feel like you are going to faint?\" \"Can you stand and walk?\"    - MILD - walking normally    - MODERATE - interferes with normal activities (e.g., work, school)     - SEVERE - unable to stand, requires support to walk, feels like passing out now.       mild  5. ONSET:  \"When did the dizziness begin?\"      Last Friday was lightheaded  6. AGGRAVATING FACTORS: \"Does anything make it worse?\" (e.g., standing, change in head position)      No  7. HEART RATE: \"Can you tell me your heart rate?\" \"How many beats in 15 seconds?\"  (Note: not all patients can do this)        Per patient recalls HR/BP were normal when EMT came out  8. CAUSE: \"What do you think is causing the dizziness?\"      unsure  9. RECURRENT SYMPTOM: \"Have you had dizziness before?\" If so, ask: \"When was the last time?\" \"What happened that time?\"      Not like this  10. OTHER SYMPTOMS: \"Do you have any other symptoms?\" (e.g., fever, chest pain, vomiting, diarrhea, bleeding)        No  11. PREGNANCY: \"Is there any chance you are pregnant?\" \"When was your last menstrual period?\"        n/a    Protocols used: DIZZINESS-A-OH    "

## 2020-07-20 NOTE — TELEPHONE ENCOUNTER
Reason for Call:  Other call back    Detailed comments: Kathy said on 7/17/2020 she had an episode of light headiness.     Phone Number Patient can be reached at: Home number on file [phone] (home)    Best Time: any    Can we leave a detailed message on this number? YES    Call taken on 7/20/2020 at 10:23 AM by Annemarie Dickerson

## 2020-07-21 NOTE — PROGRESS NOTES
Subjective     Kathy Braswell is a 85 year old female who presents to clinic today for the following health issues:    HPI     Dizziness      Duration: One episode, occurred on Friday when patient was having her hair done by her daughter-in-law. Patient states she had an episode of lightheadedness after her hair was blow dried. EMT was called and sensation resolved.      Description   Feeling faint:  no   Feeling like the surroundings are moving: no   Loss of consciousness or falls: no     Intensity:  moderate    Accompanying signs and symptoms:   Nausea/vomitting: no   Palpitations: no   Weakness in arms or legs: no   Vision or speech changes: no   Ringing in ears (Tinnitus): no   Hearing loss related to dizziness: no   Other (fevers/chills/sweating/dyspnea): no     History (similar episodes/head trauma/previous evaluation/recent bleeding): None    Precipitating or alleviating factors (new meds/chemicals): None   Worse with activity/head movement: no     Therapies tried and outcome: EMT was called, EKG rhythm strip completed. Patient states no reoccurring episodes     Patient reports she has had this late for which an EMT evaluation was undertaken and the patient EKG was found to be normal time of presentation.  Patient presents today with a copy of the rhythm strip with a 12-lead projection which does appear to be normal.    Patient states that she was bending over the sink washing her hair with her hairdresser for an extended period of time.  She then popped her head back up and went and sat down in the chair and was drying her hair at which time she felt a little dizzy.  It was not associated with vision changes, headache, numbness, tingling or impairment of motor function.  Her speech was not impaired.  The symptoms lasted about 20 seconds and resolved and she has had no symptoms since.  Apparently her son called the EMTs to come and evaluate her but the patient informs me that if she has been alone she would not  have called anybody about the symptoms as they were not significantly concerning to her.    Patient Active Problem List   Diagnosis     Essential hypertension, benign     Esophageal reflux     Allergic rhinitis     Insomnia     Benign neoplasm of colon     Generalized osteoarthrosis, unspecified site     Plantar fascia syndrome     Anxiety     Colon polyps     CARDIOVASCULAR SCREENING; LDL GOAL LESS THAN 130     Advanced directives, counseling/discussion     Mild major depression (H)     Lumbago     Irritable bowel syndrome     Osteopenia     Past Surgical History:   Procedure Laterality Date     C NONSPECIFIC PROCEDURE      excision of cyst      C NONSPECIFIC PROCEDURE      D&C x 2 after miscarriage and menometrorrhagia     C NONSPECIFIC PROCEDURE  2/99    colonoscopy     PHACOEMULSIFICATION CLEAR CORNEA WITH STANDARD INTRAOCULAR LENS IMPLANT  5/15/2013    Procedure: PHACOEMULSIFICATION CLEAR CORNEA WITH STANDARD INTRAOCULAR LENS IMPLANT;  RIGHT PHACOEMULSIFICATION CLEAR CORNEA WITH STANDARD INTRAOCULAR LENS IMPLANT  ;  Surgeon: Soy Hall MD;  Location: [facility]     PHACOEMULSIFICATION CLEAR CORNEA WITH STANDARD INTRAOCULAR LENS IMPLANT  10/30/2013    Procedure: PHACOEMULSIFICATION CLEAR CORNEA WITH STANDARD INTRAOCULAR LENS IMPLANT;  LEFT PHACOEMULSIFICATION CLEAR CORNEA WITH STANDARD INTRAOCULAR LENS IMPLANT;  Surgeon: Soy Hall MD;  Location: [facility]       Social History     Tobacco Use     Smoking status: Never Smoker     Smokeless tobacco: Never Used   Substance Use Topics     Alcohol use: Yes     Alcohol/week: 0.0 standard drinks     Comment: seldom     Family History   Problem Relation Age of Onset     Hypertension Mother      Prostate Cancer Father          Current Outpatient Medications   Medication Sig Dispense Refill     acetaminophen (TYLENOL) 325 MG tablet Take 1-2 tablets (325-650 mg) by mouth every 6 hours as needed for mild pain       alendronate (FOSAMAX) 70 MG tablet Take 1 tablet (70 mg)  by mouth every 7 days Take with over 8 ounces water and stay upright for at least 30 minutes after dose.  Take at least 60 minutes before breakfast 12 tablet 3     busPIRone (BUSPAR) 15 MG tablet TAKE 1 TABLET DAILY 90 tablet 3     cholecalciferol (VITAMIN D) 1000 UNIT tablet Take 1 tablet by mouth daily.       FLUoxetine (PROZAC) 20 MG capsule TAKE 1 CAPSULE BY MOUTH EVERY DAY 90 capsule 3     fluticasone (FLONASE) 50 MCG/ACT spray Spray 1-2 sprays into both nostrils daily 1 Bottle 11     irbesartan-hydrochlorothiazide (AVALIDE) 150-12.5 MG tablet TAKE 1 TABLET BY MOUTH DAILY 90 tablet 1     LORazepam (ATIVAN) 0.5 MG tablet TAKE 1 TABLET BY MOUTH DAILY AS NEEDED 30 tablet 0     ORDER FOR DME, SET TO LOCAL PRINT, BP cuff, brand as covered by insurance.  Dx: HTN 1 each 0     zolpidem ER (AMBIEN CR) 6.25 MG CR tablet TAKE 1 TABLET BY MOUTH EVERY DAY AT BEDTIME AS NEEDED 90 tablet 0     Allergies   Allergen Reactions     Citalopram      Nausea       Codeine      vomiting     Cymbalta      dizziness     Lisinopril      cough     Metoprolol Succinate      toprol xl results in nausea, upper abd. pain     Trazodone Hcl      Caused dizziness and nightmares     Valsartan      diovan results in nause , upper abd. pain     BP Readings from Last 3 Encounters:   01/09/20 118/78   07/08/19 124/70   10/09/18 132/70    Wt Readings from Last 3 Encounters:   01/09/20 62.1 kg (137 lb)   07/08/19 60.3 kg (133 lb)   10/09/18 59 kg (130 lb)                    Reviewed and updated as needed this visit by Provider         Review of Systems   CONSTITUTIONAL: NEGATIVE for fever, chills, change in weight  EYES: NEGATIVE for vision changes or irritation  ENT/MOUTH: NEGATIVE for ear, mouth and throat problems  RESP: NEGATIVE for significant cough or SOB  CV: NEGATIVE for chest pain, palpitations or peripheral edema  GI: NEGATIVE for nausea, abdominal pain, heartburn, or change in bowel habits  : NEGATIVE for frequency, dysuria, or  hematuria  MUSCULOSKELETAL: NEGATIVE for significant arthralgias or myalgia  ENDOCRINE: NEGATIVE for temperature intolerance, skin/hair changes  HEME: NEGATIVE for bleeding problems  PSYCHIATRIC: NEGATIVE for changes in mood or affect      Objective    /76   Pulse 91   Temp 98.6  F (37  C) (Temporal)   Resp 15   Wt 62.6 kg (138 lb)   SpO2 97%   BMI 31.50 kg/m    Body mass index is 31.5 kg/m .  Physical Exam   GENERAL: healthy, alert and no distress  EYES: Eyes grossly normal to inspection, PERRL and conjunctivae and sclerae normal  HENT: ear canals and TM's normal, nose and mouth without ulcers or lesions  NECK: no adenopathy, no asymmetry, masses, or scars and thyroid normal to palpation, no bruits noted  RESP: lungs clear to auscultation - no rales, rhonchi or wheezes  CV: regular rate and rhythm, normal S1 S2, no S3 or S4, soft systolic murmur, no click or rub, no peripheral edema and peripheral pulses strong  ABDOMEN: soft, nontender, no hepatosplenomegaly, no masses and bowel sounds normal  MS: no gross musculoskeletal defects noted  NEURO: Normal strength and tone, mentation intact and speech normal  PSYCH: mentation appears normal, affect normal/bright      Component      Latest Ref Rng & Units 1/9/2020   Sodium      133 - 144 mmol/L 137   Potassium      3.4 - 5.3 mmol/L 3.6   Chloride      94 - 109 mmol/L 101   Carbon Dioxide      20 - 32 mmol/L 30   Anion Gap      3 - 14 mmol/L 6   Glucose      70 - 99 mg/dL 97   Urea Nitrogen      7 - 30 mg/dL 12   Creatinine      0.52 - 1.04 mg/dL 0.59   GFR Estimate      >60 mL/min/1.73:m2 85   GFR Estimate If Black      >60 mL/min/1.73:m2 >90   Calcium      8.5 - 10.1 mg/dL 9.2   TSH      0.40 - 4.00 mU/L 1.96   Hemoglobin      11.7 - 15.7 g/dL 14.2     Assessment & Plan     1. Dizziness  No focal changes noted on exam.  Question mild positional vertigo related to head positioning while washing her hair.  I do not see any red flag complaints historically for  "any clinical exam changes less a slight heart murmur.  I did discussed with the patient in depth the options that were available including observation versus a ZIO Patch and an echocardiogram.  She  recently had lab work done in January inclusive of a BMP, hemoglobin and thyroid function test that were normal thus I do not feel that based on these types of symptoms repeat labs are warranted at this point.  She is also had no recurrence of her symptoms since.  We have opted to observe her symptoms at the present time and she will contact me if they worsen or recur and schedule the echocardiogram accordingly    2. Heart murmur  Left an echocardiogram for reassessment and reassurance of structural status of heart.  - Echocardiogram Complete; Future     BMI:   Estimated body mass index is 31.27 kg/m  as calculated from the following:    Height as of 1/9/20: 1.41 m (4' 7.5\").    Weight as of 1/9/20: 62.1 kg (137 lb).       See Patient Instructions    Return for diagnostic test as ordered, Lab Work appointment.    Soy Tim MD  [facility]    "

## 2020-07-22 ENCOUNTER — APPOINTMENT (OUTPATIENT)
Dept: CT IMAGING | Facility: CLINIC | Age: 85
End: 2020-07-22
Attending: EMERGENCY MEDICINE
Payer: COMMERCIAL

## 2020-07-22 ENCOUNTER — OFFICE VISIT (OUTPATIENT)
Dept: URGENT CARE | Facility: URGENT CARE | Age: 85
End: 2020-07-22
Payer: COMMERCIAL

## 2020-07-22 ENCOUNTER — OFFICE VISIT (OUTPATIENT)
Dept: INTERNAL MEDICINE | Facility: CLINIC | Age: 85
End: 2020-07-22
Payer: COMMERCIAL

## 2020-07-22 ENCOUNTER — HOSPITAL ENCOUNTER (EMERGENCY)
Facility: CLINIC | Age: 85
Discharge: HOME | End: 2020-07-22
Attending: EMERGENCY MEDICINE | Admitting: EMERGENCY MEDICINE
Payer: COMMERCIAL

## 2020-07-22 VITALS
OXYGEN SATURATION: 98 % | SYSTOLIC BLOOD PRESSURE: 140 MMHG | HEART RATE: 88 BPM | RESPIRATION RATE: 20 BRPM | BODY MASS INDEX: 31.5 KG/M2 | TEMPERATURE: 98.1 F | DIASTOLIC BLOOD PRESSURE: 88 MMHG | WEIGHT: 138 LBS

## 2020-07-22 VITALS
SYSTOLIC BLOOD PRESSURE: 136 MMHG | WEIGHT: 138 LBS | RESPIRATION RATE: 15 BRPM | HEART RATE: 91 BPM | DIASTOLIC BLOOD PRESSURE: 76 MMHG | BODY MASS INDEX: 31.5 KG/M2 | OXYGEN SATURATION: 97 % | TEMPERATURE: 98.6 F

## 2020-07-22 VITALS
HEIGHT: 58 IN | DIASTOLIC BLOOD PRESSURE: 78 MMHG | WEIGHT: 139 LBS | BODY MASS INDEX: 29.18 KG/M2 | OXYGEN SATURATION: 97 % | HEART RATE: 75 BPM | TEMPERATURE: 98 F | SYSTOLIC BLOOD PRESSURE: 189 MMHG

## 2020-07-22 DIAGNOSIS — S09.90XA CLOSED HEAD INJURY, INITIAL ENCOUNTER: ICD-10-CM

## 2020-07-22 DIAGNOSIS — S01.01XA LACERATION OF SCALP, INITIAL ENCOUNTER: ICD-10-CM

## 2020-07-22 DIAGNOSIS — R42 DIZZINESS: Primary | ICD-10-CM

## 2020-07-22 DIAGNOSIS — R01.1 HEART MURMUR: ICD-10-CM

## 2020-07-22 DIAGNOSIS — S09.90XA INJURY OF HEAD, INITIAL ENCOUNTER: Primary | ICD-10-CM

## 2020-07-22 PROCEDURE — 99214 OFFICE O/P EST MOD 30 MIN: CPT | Performed by: INTERNAL MEDICINE

## 2020-07-22 PROCEDURE — 12001 RPR S/N/AX/GEN/TRNK 2.5CM/<: CPT

## 2020-07-22 PROCEDURE — 70450 CT HEAD/BRAIN W/O DYE: CPT

## 2020-07-22 PROCEDURE — 99207 ZZC NO BILLABLE SERVICE THIS VISIT: CPT | Performed by: PHYSICIAN ASSISTANT

## 2020-07-22 PROCEDURE — 99284 EMERGENCY DEPT VISIT MOD MDM: CPT | Mod: 25

## 2020-07-22 ASSESSMENT — MIFFLIN-ST. JEOR: SCORE: 975.25

## 2020-07-22 ASSESSMENT — ENCOUNTER SYMPTOMS
WOUND: 1
NECK PAIN: 0

## 2020-07-22 ASSESSMENT — PATIENT HEALTH QUESTIONNAIRE - PHQ9: SUM OF ALL RESPONSES TO PHQ QUESTIONS 1-9: 5

## 2020-07-22 NOTE — ED AVS SNAPSHOT
Emergency Department  [address] [facility] [address]  Phone:  [phone]  Fax:  [phone]                                    Kathy Braswell   MRN: [id number]    Department:   Emergency Department   Date of Visit:  7/22/2020           After Visit Summary Signature Page    I have received my discharge instructions, and my questions have been answered. I have discussed any challenges I see with this plan with the nurse or doctor.    ..........................................................................................................................................  Patient/Patient Representative Signature      ..........................................................................................................................................  Patient Representative Print Name and Relationship to Patient    ..................................................               ................................................  Date                                   Time    ..........................................................................................................................................  Reviewed by Signature/Title    ...................................................              ..............................................  Date                                               Time          22EPIC Rev 08/18

## 2020-07-22 NOTE — ED TRIAGE NOTES
Fell back while gardening and has hematoma and laceration to her posterior head, no loc, not on blood thinners

## 2020-07-22 NOTE — PROGRESS NOTES
Patient presents with her son after becoming dizzy and falling onto cement PTA.  She was here earlier for dizziness to see her PCP.  Patient's head has an abrasion and a large hematoma.  She does not take blood thinners and states she's been getting dizzy on and off for a while.  She's never had a head CT.  No LOC, CP or SOB.  Patient sent to ED for evaluation.  Patient left in stable condition.  Her son will take her to ED.

## 2020-07-23 NOTE — ED PROVIDER NOTES
"History     Chief Complaint:  Fall    The history is provided by the patient and a relative.     Kathy Braswell is a 85 year old year old female with a history of hypertension who presents for evaluation after a fall. To note, the patient and her son note that her balance has been declining over the last 5 years. Prior to arrival, the patient reports going outside to trim the bushes. She states she turned around and fell onto the side walk hitting the back of her head. She notes she is not sure how she fell but knows she did not lose consciousness. She did sustain a laceration to the back of her head and she scraped her right elbow. Patient was able to get up on her own. She denies any neck pain or being on blood thinners.     Allergies:  Citalopram  Codeine  Cymbalta  Lisinopril  Metoprolol Succinate  Trazodone Hcl  Valsartan    Medications:   Fosamax  Buspar  Avalide  Ativan  Ambien     Medical History:   GERD  Hypertension  Insomnia  IBS  Depression  Osteopenia  IBS  Lumbago  Plantar fascia syndrome    Surgical History   Cyst excision  D&C x2  Colonoscopy  Eye surgery x2    Family History:   Hypertension  Prostate cancer     Social History:  The patient was accompanied to the ED by her son.  Smoking Status: Never Smoker  Smokeless Tobacco: Never Used  Alcohol Use: Positive  [place]       Review of Systems   Musculoskeletal: Negative for neck pain.   Skin: Positive for wound (back of scalp).   Neurological: Negative for syncope.   All other systems reviewed and are negative.        Physical Exam     Patient Vitals for the past 24 hrs:   BP Temp Temp src Pulse SpO2 Height Weight   07/22/20 1818 (!) 196/78 98  F (36.7  C) Temporal 78 97 % 1.473 m (4' 10\") 63 kg (139 lb)       Physical Exam  GENERAL: well developed, pleasant  HEAD: 2 cm laceration to the right parietal region of the scalp.  EYES: pupils reactive, extraocular muscles intact, conjunctivae normal  ENT:  mucus membranes moist  NECK:  trachea midline, " normal range of motion  RESPIRATORY: no tachypnea, breath sounds clear to auscultation   CVS: normal S1/S2, no murmurs, intact distal pulses  ABDOMEN: soft, nontender, nondistention  MUSCULOSKELETAL: no deformities  SKIN: warm and dry, no acute rashes or ulceration  NEURO: GCS 15, cranial nerves intact, alert and oriented x3  PSYCH:  Mood/affect normal    Emergency Department Course   Imaging:  Radiology findings were communicated with the patient and family who voiced understanding of the findings.    CT Head without contrast:   There is a right parietal scalp hematoma. There is no evidence for intracranial hemorrhage or skull fracture. Mild cerebral and cerebellar atrophy is present. Minimal nonspecific white matter changes without mass effect are present. There is no evidence for mass effect or acute infarct. Visualized paranasal sinuses and mastoid air cells are clear.  As per radiology.    Procedures    Laceration Repair        LACERATION:  A simple and superficial clean 2 cm laceration.      LOCATION:  Right occipital scalp       FUNCTION:  Sensation, circulation, and tendon function are intact.      ANESTHESIA:  Local using 0.5% Lidocaine with epi total of 1 mLs      PREPARATION:  Irrigation and Scrubbing with Normal Saline and Shur Clens      DEBRIDEMENT:  no debridement      CLOSURE:  Wound was closed with One Layer.  Skin closed with 2 x 4.0 Ethylon using interrupted sutures.    Emergency Department Course:  Past medical records, nursing notes, and vitals reviewed.    8:42 PM I performed an exam of the patient as documented above.     The patient was sent for a head CT while in the emergency department, results above.     2257 I rechecked the patient and discussed the results of her workup thus far. I repaired the patient's laceration, see procedure note above.    Findings and plan explained to the Patient and son. Patient discharged home with instructions regarding supportive care, medications, and  reasons to return. The importance of close follow-up was reviewed.     I personally reviewed the imaging results with the Patient and answered all related questions prior to discharge.     Impression & Plan   Medical Decision Making:  Kathy Braswell is a 85 year old female who presents today for mechanical fall.  She has a scalp laceration to the right occipital parietal region.  CT is negative for bleed.  Wound was cleansed and sutured by myself.  Discussed outpatient management with her.  She has no other pains and is here with her son.    Diagnosis:    ICD-10-CM    1. Closed head injury, initial encounter  S09.90XA    2. Laceration of scalp, initial encounter  S01.01XA        Disposition:  Discharged to home.    Scribe Disclosure:  I, Marilin Silver, am serving as a scribe at 8:42 PM on 7/22/2020 to document services personally performed by Joshua Donahue MD, based on my observations and the provider's statements to me.      Joshua Donahue MD  07/22/20 0581

## 2020-07-23 NOTE — DISCHARGE INSTRUCTIONS
Discharge Instructions  Head Injury    You have been seen today for a head injury. You were checked for serious problems, like bleeding on the brain, but these problems cannot always be found right away.  Due to this risk, you should not be alone for 24 hours after your injury.  Follow up with your regular physician in 7 days. If you are taking a blood thinner, such as aspirin, Pradaxa  (dabigatran), Coumadin  (warfarin), or Plavix  (clopidogrel), you are at especially high risk for immediate or delayed bleeding, and need to re-check with a physician in 24 hours, or sooner if any of the symptoms below happen.     Return to the Emergency Department if:  You are confused, have amnesia, or you are not acting right.  Your headache gets worse or you start to have a really bad headache even with your recommended treatment plan.  You vomit more than once.  You have a convulsion or seizure.  You have trouble walking.  You have weakness or paralysis in an arm or a leg.  You have blood or fluid coming from your ears or nose.  You have new symptoms or anything that worries you.    Sleeping:  It is okay for you to sleep, but someone should wake you up as instructed by your doctor, and someone should check on you at your usual time to wake up.     Activity:  Do not drive for at least 24 hours.  Do not drive if you have dizzy spells or trouble concentrating, or remembering things.  Do not return to any contact sports until cleared by your regular doctor.     Follow-up:  It is very important that you make an appointment with your clinic and go to the appointment.  If you do not follow-up with your regular doctor, it may result in missing an important development which could result in permanent injury or disability and/or lasting pain.  If there is any problem keeping your appointment, call your doctor or return to the Emergency Department.    MORE INFORMATION:    Concussion:  A concussion is a minor head injury that may cause  temporary problems with the way your brain works.  Some symptoms include:  confusion, amnesia, nausea and vomiting, dizziness, fatigue, memory or concentration problems, irritability and sleep problems.    CT Scans: Your evaluation today may have included a CT scan (CAT scan) to look for things like bleeding or a skull fracture (break).  CT scans involve radiation and too many CT scans can cause serious health problems like cancer, especially in children.  Because of this, your doctor may not have ordered a CT scan today if they think you are at low risk for a serious or life threatening problem.    If you were given a prescription for medicine here today, be sure to read all of the information (including the package insert) that comes with your prescription.  This will include important information about the medicine, its side effects, and any warnings that you need to know about.  The pharmacist who fills the prescription can provide more information and answer questions you may have about the medicine.  If you have questions or concerns that the pharmacist cannot address, please call or return to the Emergency Department.     Opioid Medication Information    Pain medications are among the most commonly prescribed medicines, so we are including this information for all our patients. If you did not receive pain medication or get a prescription for pain medicine, you can ignore it.     You may have been given a prescription for an opioid (narcotic) pain medicine and/or have received a pain medicine while here in the Emergency Department. These medicines can make you drowsy or impaired. You must not drive, operate dangerous equipment, or engage in any other dangerous activities while taking these medications. If you drive while taking these medications, you could be arrested for DUI, or driving under the influence. Do not drink any alcohol while you are taking these medications.     Opioid pain medications can cause  addiction. If you have a history of chemical dependency of any type, you are at a higher risk of becoming addicted to pain medications.  Only take these prescribed medications to treat your pain when all other options have been tried. Take it for as short a time and as few doses as possible. Store your pain pills in a secure place, as they are frequently stolen and provide a dangerous opportunity for children or visitors in your house to start abusing these powerful medications. We will not replace any lost or stolen medicine.  As soon as your pain is better, you should flush all your remaining medication.     Many prescription pain medications contain Tylenol  (acetaminophen), including Vicodin , Tylenol #3 , Norco , Lortab , and Percocet .  You should not take any extra pills of Tylenol  if you are using these prescription medications or you can get very sick.  Do not ever take more than 3000 mg of acetaminophen in any 24 hour period.    All opioids tend to cause constipation. Drink plenty of water and eat foods that have a lot of fiber, such as fruits, vegetables, prune juice, apple juice and high fiber cereal.  Take a laxative if you don t move your bowels at least every other day. Miralax , Milk of Magnesia, Colace , or Senna  can be used to keep you regular.      Remember that you can always come back to the Emergency Department if you are not able to see your regular doctor in the amount of time listed above, if you get any new symptoms, or if there is anything that worries you.     Discharge Instructions  Laceration (Cut)    You were seen today for a laceration (cut).  Your doctor examined your laceration for any problems such a buried foreign body (like glass, a splinter, or gravel), or injury to blood vessels, tendons, and nerves.  Your doctor may have also rinsed and/or scrubbed your laceration to help prevent an infection.  Your laceration may have been closed with glue, staples or sutures (stitches).       It may not be possible to find all problems with your laceration on the first visit, and we can't always prevent infections.  Antibiotics are only given when the benefit is more than the risk, and don't prevent all infections. Some lacerations are too high risk to close, and are left open to heal.  All lacerations, no matter how expertly repaired, will cause scarring.    Return to the Emergency Department right away if:  You have more redness, swelling, pain, drainage (pus), a bad smell, or red streaking from your laceration.    You have a fever of 101oF or more.  You have bleeding that you can t stop at home. If your cut starts to bleed, hold pressure on the bleeding area with a clean cloth or put pressure over the bandage.  If the bleeding doesn t stop after using constant pressure for 30 minutes, you should return to the Emergency Department for further treatment.  An area past the laceration is cool, pale, or blue compared with the other side, or has a slower return of color when squeezed.  Your dressing seems too tight or starts to get uncomfortable or painful.  You have loss of normal function or use of an area, such as being unable to straighten or bend a finger normally.  You have a numb area past the laceration.    Return to the Emergency Department or see your regular doctor if:  The laceration starts to come open.   You have something coming out of the cut or a feeling that there is something in the laceration.  Your wound will not heal, or keeps breaking open. There can always be glass, wood, dirt or other things in any wound.  They won t always show up, even on x-rays.  If a wound doesn t heal, this may be why, and it is important to follow-up with your regular doctor.    Home Care:  Take your dressing off in 12 hours, or as instructed by your doctor, to check your laceration. Remove the dressing sooner if it seems too tight or painful, or if it is getting numb, tingly, or pale past the  "dressing.  Gently wash your laceration 2 times a day with clean cloth and soap.   It is okay to shower, but do not let the laceration soak in water.    If your laceration was closed with wound adhesive or strips: pat it dry and leave it open to the air.   For all other repairs: after you wash your laceration, or at least 2 times a day, apply bacitracin or other antibiotic ointment to the laceration, then cover it with a Band-Aid  or gauze.  Keep the laceration clean. Wear gloves or other protective clothing if you are around dirt.    Follow-up:  You need to follow-up with your regular doctor in 7 days.  Your sutures or staples need to be removed in 7 days. Schedule an appointment with your regular doctor to have this done.    Scars:  To help minimize scarring:  Wear sunscreen over the healed laceration when out in the sun.  Massage the area regularly.  You may use Vitamin E oil.  Wait a year.  Most scars will start to fade within a year.    Probiotics: If you have been given an antibiotic, you may want to also take a probiotic pill or eat yogurt with live cultures. Probiotics have \"good bacteria\" to help your intestines stay healthy. Studies have shown that probiotics help prevent diarrhea and other intestine problems (including C. diff infection) when you take antibiotics. You can buy these without a prescription in the pharmacy section of the store.     If you were given a prescription for medicine here today, be sure to read all of the information (including the package insert) that comes with your prescription.  This will include important information about the medicine, its side effects, and any warnings that you need to know about.  The pharmacist who fills the prescription can provide more information and answer questions you may have about the medicine.  If you have questions or concerns that the pharmacist cannot address, please call or return to the Emergency Department.     Opioid Medication " Information    Pain medications are among the most commonly prescribed medicines, so we are including this information for all our patients. If you did not receive pain medication or get a prescription for pain medicine, you can ignore it.     You may have been given a prescription for an opioid (narcotic) pain medicine and/or have received a pain medicine while here in the Emergency Department. These medicines can make you drowsy or impaired. You must not drive, operate dangerous equipment, or engage in any other dangerous activities while taking these medications. If you drive while taking these medications, you could be arrested for DUI, or driving under the influence. Do not drink any alcohol while you are taking these medications.     Opioid pain medications can cause addiction. If you have a history of chemical dependency of any type, you are at a higher risk of becoming addicted to pain medications.  Only take these prescribed medications to treat your pain when all other options have been tried. Take it for as short a time and as few doses as possible. Store your pain pills in a secure place, as they are frequently stolen and provide a dangerous opportunity for children or visitors in your house to start abusing these powerful medications. We will not replace any lost or stolen medicine.  As soon as your pain is better, you should flush all your remaining medication.     Many prescription pain medications contain Tylenol  (acetaminophen), including Vicodin , Tylenol #3 , Norco , Lortab , and Percocet .  You should not take any extra pills of Tylenol  if you are using these prescription medications or you can get very sick.  Do not ever take more than 3000 mg of acetaminophen in any 24 hour period.    All opioids tend to cause constipation. Drink plenty of water and eat foods that have a lot of fiber, such as fruits, vegetables, prune juice, apple juice and high fiber cereal.  Take a laxative if you don t  move your bowels at least every other day. Miralax , Milk of Magnesia, Colace , or Senna  can be used to keep you regular.      Remember that you can always come back to the Emergency Department if you are not able to see your regular doctor in the amount of time listed above, if you get any new symptoms, or if there is anything that worries you.

## 2020-07-28 ENCOUNTER — TELEPHONE (OUTPATIENT)
Dept: INTERNAL MEDICINE | Facility: CLINIC | Age: 85
End: 2020-07-28

## 2020-07-28 NOTE — PROGRESS NOTES
Subjective     Kathy Braswell is a 85 year old female who presents to clinic today for the following health issues:    HPI       ED/UC Followup:    Facility:  [facility] ED  Date of visit: 7/22/20  Reason for visit: Fall  Current Status: Still having dizziness, worse in AM when getting out of bed. Still having some nausea. States she does not feel like herself. Believes that wound is healing well.    Kathy Braswell is a 85 year old female who presents today for mechanical fall.  She has a scalp laceration to the right occipital parietal region.  CT is negative for bleed.  Wound was cleansed and sutured by myself.  Discussed outpatient management with her.  She has no other pains and is here with her son.     Diagnosis:     ICD-10-CM     1. Closed head injury, initial encounter  S09.90XA     2. Laceration of scalp, initial encounter  S01.01XA          Disposition:  Discharged to home.      -------------------------------------    BP Readings from Last 3 Encounters:   07/29/20 (!) 156/76   07/22/20 (!) 189/78   07/22/20 (!) 140/88    Wt Readings from Last 3 Encounters:   07/29/20 62.1 kg (137 lb)   07/22/20 63 kg (139 lb)   07/22/20 62.6 kg (138 lb)                    Reviewed and updated as needed this visit by Provider         Review of Systems   Constitutional, HEENT, cardiovascular, pulmonary, gi and gu systems are negative, except as otherwise noted.      Objective    There were no vitals taken for this visit.  There is no height or weight on file to calculate BMI.  Physical Exam   GENERAL: healthy, alert and no distress  HENT: with healing laceration left posterior scalp  Some scabbing noted, 2 interupted sutures in place     Area cleansed with betadine and alcohol swabs,   Sutures removed in usual fashion  No bleeding  Tolerated well   RESP: lungs clear to auscultation - no rales, rhonchi or wheezes  CV: regular rates and rhythm and normal S1 S2, no S3 or S4  MS: no gross musculoskeletal defects noted, no  edema  NEURO: Normal strength and tone, mentation intact and speech normal    Diagnostic Test Results:  Labs reviewed in Epic        Assessment & Plan     1. Closed head injury, initial encounter      2. Laceration of scalp, subsequent encounter       25 minutes spent with patient > 50 %of time on counseling and plan of care.     Patient Instructions   You will be dizzy and nauseated for a few more weeks.    If the dizziness is worsening or you are vomiting with the nausea then need to talk with the nurse.     No driving until you are no longer dizzy     Ok to shampoo hair - do not scrub at the area where the sutures were.           Return in about 6 months (around 1/29/2021) for Routine Visit, regular primary provider.    Julianna Hansen PA-C  [facility]

## 2020-07-28 NOTE — TELEPHONE ENCOUNTER
Reason for Call:  Other symptoms    Detailed comments: Patient fell on 7/22 and went to urgent care and was sent to [facility]. Has an appointment with Julianna Hansen to get her stitches removed 7/29. She stated that she has been having a lot of dizziness since her fall and has to have someone help her get out of bed. She wanted to make sure Dr. Ospina was aware of what is going on and has questions about any follow up that needs to happen to deal with the dizziness. She would like a call back to discuss this.     Phone Number Patient can be reached at: Home number on file [phone] (home)    Best Time: Any    Can we leave a detailed message on this number? YES    Call taken on 7/28/2020 at 1:33 PM by Michaela Seymour

## 2020-07-28 NOTE — TELEPHONE ENCOUNTER
I saw ER note in chart.  Pt seeing Julianna tomorrow per chart and can do some further eval of walking ability and whether needs referral to physical therapy vs neurology. Hard for me to comment more without being able to examine pt. Will await eval from Julianna tomorrow

## 2020-07-29 ENCOUNTER — OFFICE VISIT (OUTPATIENT)
Dept: INTERNAL MEDICINE | Facility: CLINIC | Age: 85
End: 2020-07-29
Payer: COMMERCIAL

## 2020-07-29 VITALS
DIASTOLIC BLOOD PRESSURE: 76 MMHG | HEART RATE: 91 BPM | WEIGHT: 137 LBS | OXYGEN SATURATION: 94 % | TEMPERATURE: 98.5 F | BODY MASS INDEX: 28.63 KG/M2 | SYSTOLIC BLOOD PRESSURE: 156 MMHG | RESPIRATION RATE: 18 BRPM

## 2020-07-29 DIAGNOSIS — S09.90XA CLOSED HEAD INJURY, INITIAL ENCOUNTER: Primary | ICD-10-CM

## 2020-07-29 DIAGNOSIS — S01.01XD LACERATION OF SCALP, SUBSEQUENT ENCOUNTER: ICD-10-CM

## 2020-07-29 PROCEDURE — 99214 OFFICE O/P EST MOD 30 MIN: CPT | Performed by: PHYSICIAN ASSISTANT

## 2020-07-29 NOTE — PATIENT INSTRUCTIONS
You will be dizzy and nauseated for a few more weeks.    If the dizziness is worsening or you are vomiting with the nausea then need to talk with the nurse.     No driving until you are no longer dizzy     Ok to shampoo hair - do not scrub at the area where the sutures were.

## 2020-08-06 ENCOUNTER — NURSE TRIAGE (OUTPATIENT)
Dept: INTERNAL MEDICINE | Facility: CLINIC | Age: 85
End: 2020-08-06

## 2020-08-06 NOTE — TELEPHONE ENCOUNTER
Pt needs to be seen for ongoing issue. Will see tomorrow in clinic at 8:20 AM. Appt scheduled. Inform pt

## 2020-08-06 NOTE — TELEPHONE ENCOUNTER
Pt is wondering if MD will see her . PT declined ER and UC but agreed to go if PCP recommends . PT declined video visit but will speak with provider via telephone visit .Ryann Pearl RN

## 2020-08-06 NOTE — TELEPHONE ENCOUNTER
Additional Information    Negative: Shock suspected (e.g., cold/pale/clammy skin, too weak to stand, low BP, rapid pulse)    Negative: Difficult to awaken or acting confused (e.g., disoriented, slurred speech)    Negative: Fainted, and still feels dizzy afterwards    Negative: Severe difficulty breathing (e.g., struggling for each breath, speaks in single words)    Negative: Overdose (accidental or intentional) of medications    Negative: New neurologic deficit that is present now: * Weakness of the face, arm, or leg on one side of the body * Numbness of the face, arm, or leg on one side of the body * Loss of speech or garbled speech    Negative: Heart beating < 50 beats per minute OR > 140 beats per minute    Negative: Sounds like a life-threatening emergency to the triager    Negative: Chest pain    Negative: Rectal bleeding, bloody stool, or tarry-black stool    Negative: Vomiting is the main symptom    Negative: Diarrhea is the main symptom    Negative: Headache is the main symptom    Negative: Heat exhaustion suspected (i.e., dehydration from heat exposure)    Negative: Patient states that he/she is having an anxiety/panic attack    Negative: SEVERE dizziness (e.g., unable to stand, requires support to walk, feels like passing out now)    Negative: SEVERE headache or neck pain    Negative: Spinning or tilting sensation (vertigo) present now and one or more stroke risk factors (i.e., hypertension, diabetes, prior stroke/TIA, heart attack, age over 60) (Exception: prior physician evaluation for this AND no different/worse than usual)    Negative: Loss of vision or double vision    Negative: Extra heart beats OR irregular heart beating (i.e., 'palpitations')    Negative: Difficulty breathing    Negative: Drinking very little and has signs of dehydration (e.g., no urine > 12 hours, very dry mouth, very lightheaded)    Negative: Follows bleeding (e.g., stomach, rectum, vagina) (Exception: became dizzy from sight  "of small amount blood)    Negative: Patient sounds very sick or weak to the triager    Negative: Lightheadedness (dizziness) present now, after 2 hours of rest and fluids    Negative: Spinning or tilting sensation (vertigo) present now    Negative: Fever > 103 F (39.4 C)    Negative: Fever > 100.0 F (37.8 C) and has diabetes mellitus or a weak immune system (e.g., HIV positive, cancer chemotherapy, organ transplant, splenectomy, chronic steroids)    Negative: Vomiting occurs with dizziness    Negative: Patient wants to be seen    Negative: Taking a medicine that could cause dizziness (e.g., blood pressure medications, diuretics)    Negative: Diabetes    Dizziness not present now, but is a chronic symptom (recurrent or ongoing AND lasting > 4 weeks)    Negative: Poor fluid intake probably causing dizziness    Negative: Recent heat exposure probably causing the dizziness    Negative: Sudden or prolonged standing probably causing dizziness    Answer Assessment - Initial Assessment Questions  1. DESCRIPTION: \"Describe your dizziness.\"      Gets dizzy at times upon standing  2. LIGHTHEADED: \"Do you feel lightheaded?\" (e.g., somewhat faint, woozy, weak upon standing)      Yes like I need to sit right back down   3. VERTIGO: \"Do you feel like either you or the room is spinning or tilting?\" (i.e. vertigo)      no  4. SEVERITY: \"How bad is it?\"  \"Do you feel like you are going to faint?\" \"Can you stand and walk?\"    - MILD - walking normally    - MODERATE - interferes with normal activities (e.g., work, school)     - SEVERE - unable to stand, requires support to walk, feels like passing out now.       Moderate when happens upon standing  5. ONSET:  \"When did the dizziness begin?\"      7/22/20   6. AGGRAVATING FACTORS: \"Does anything make it worse?\" (e.g., standing, change in head position)    standing  7. HEART RATE: \"Can you tell me your heart rate?\" \"How many beats in 15 seconds?\"  (Note: not all patients can do this)      " "  na  8. CAUSE: \"What do you think is causing the dizziness?\"      Had a fall a few weeks ago and hit head  9. RECURRENT SYMPTOM: \"Have you had dizziness before?\" If so, ask: \"When was the last time?\" \"What happened that time?\"      Yes happens about once a week   10. OTHER SYMPTOMS: \"Do you have any other symptoms?\" (e.g., fever, chest pain, vomiting, diarrhea, bleeding)        no  11. PREGNANCY: \"Is there any chance you are pregnant?\" \"When was your last menstrual period?\"        no    Protocols used: DIZZINESS-A-OH    "

## 2020-08-06 NOTE — TELEPHONE ENCOUNTER
Fall 7/22 . Hospitalized from hitting head . experiencing . Dizziness and 2 stitches removed .Ryann Pearl RN

## 2020-08-07 ENCOUNTER — OFFICE VISIT (OUTPATIENT)
Dept: INTERNAL MEDICINE | Facility: CLINIC | Age: 85
End: 2020-08-07
Payer: COMMERCIAL

## 2020-08-07 VITALS
SYSTOLIC BLOOD PRESSURE: 152 MMHG | WEIGHT: 135 LBS | DIASTOLIC BLOOD PRESSURE: 78 MMHG | BODY MASS INDEX: 28.22 KG/M2 | TEMPERATURE: 98.6 F | RESPIRATION RATE: 16 BRPM | HEART RATE: 102 BPM | OXYGEN SATURATION: 97 %

## 2020-08-07 DIAGNOSIS — E87.1 HYPONATREMIA: ICD-10-CM

## 2020-08-07 DIAGNOSIS — R42 DIZZINESS: Primary | ICD-10-CM

## 2020-08-07 DIAGNOSIS — S09.90XD CLOSED HEAD INJURY, SUBSEQUENT ENCOUNTER: ICD-10-CM

## 2020-08-07 LAB
ANION GAP SERPL CALCULATED.3IONS-SCNC: 5 MMOL/L (ref 3–14)
BASOPHILS # BLD AUTO: 0 10E9/L (ref 0–0.2)
BASOPHILS NFR BLD AUTO: 0.3 %
BUN SERPL-MCNC: 10 MG/DL (ref 7–30)
CALCIUM SERPL-MCNC: 9.3 MG/DL (ref 8.5–10.1)
CHLORIDE SERPL-SCNC: 96 MMOL/L (ref 94–109)
CO2 SERPL-SCNC: 28 MMOL/L (ref 20–32)
CREAT SERPL-MCNC: 0.59 MG/DL (ref 0.52–1.04)
DIFFERENTIAL METHOD BLD: NORMAL
EOSINOPHIL # BLD AUTO: 0 10E9/L (ref 0–0.7)
EOSINOPHIL NFR BLD AUTO: 0.3 %
ERYTHROCYTE [DISTWIDTH] IN BLOOD BY AUTOMATED COUNT: 13.4 % (ref 10–15)
GFR SERPL CREATININE-BSD FRML MDRD: 84 ML/MIN/{1.73_M2}
GLUCOSE SERPL-MCNC: 115 MG/DL (ref 70–99)
HCT VFR BLD AUTO: 41.1 % (ref 35–47)
HGB BLD-MCNC: 14 G/DL (ref 11.7–15.7)
LYMPHOCYTES # BLD AUTO: 1.4 10E9/L (ref 0.8–5.3)
LYMPHOCYTES NFR BLD AUTO: 18.5 %
MCH RBC QN AUTO: 30.5 PG (ref 26.5–33)
MCHC RBC AUTO-ENTMCNC: 34.1 G/DL (ref 31.5–36.5)
MCV RBC AUTO: 90 FL (ref 78–100)
MONOCYTES # BLD AUTO: 0.9 10E9/L (ref 0–1.3)
MONOCYTES NFR BLD AUTO: 11.4 %
NEUTROPHILS # BLD AUTO: 5.3 10E9/L (ref 1.6–8.3)
NEUTROPHILS NFR BLD AUTO: 69.5 %
PLATELET # BLD AUTO: 300 10E9/L (ref 150–450)
POTASSIUM SERPL-SCNC: 3.7 MMOL/L (ref 3.4–5.3)
RBC # BLD AUTO: 4.59 10E12/L (ref 3.8–5.2)
SODIUM SERPL-SCNC: 129 MMOL/L (ref 133–144)
WBC # BLD AUTO: 7.6 10E9/L (ref 4–11)

## 2020-08-07 PROCEDURE — 80048 BASIC METABOLIC PNL TOTAL CA: CPT | Performed by: PHYSICIAN ASSISTANT

## 2020-08-07 PROCEDURE — 36415 COLL VENOUS BLD VENIPUNCTURE: CPT | Performed by: PHYSICIAN ASSISTANT

## 2020-08-07 PROCEDURE — 99214 OFFICE O/P EST MOD 30 MIN: CPT | Performed by: PHYSICIAN ASSISTANT

## 2020-08-07 PROCEDURE — 85025 COMPLETE CBC W/AUTO DIFF WBC: CPT | Performed by: PHYSICIAN ASSISTANT

## 2020-08-07 NOTE — PROGRESS NOTES
Subjective     Kathy Braswell is a 85 year old female who presents to clinic today for the following health issues:    HPI       Dizziness      Duration: Ongoing since 7/22/20    Description   Feeling faint:  no   Feeling like the surroundings are moving: YES- sometimes when she wakes up  Loss of consciousness or falls: YES- fell    Intensity:  moderate, severe    Accompanying signs and symptoms:   Nausea/vomitting: YES  Palpitations: no   Weakness in arms or legs: no   Vision or speech changes: no   Ringing in ears (Tinnitus): no   Hearing loss related to dizziness: no   Other (fevers/chills/sweating/dyspnea): no     History (similar episodes/head trauma/previous evaluation/recent bleeding): 7/22/20 ed visit- head injury    Seen last week for suture removal     Precipitating or alleviating factors (new meds/chemicals): None  Worse with activity/head movement: YES    Therapies tried and outcome: None    -------------------------------------    BP Readings from Last 3 Encounters:   08/07/20 (!) 152/78   07/29/20 (!) 156/76   07/22/20 (!) 189/78    Wt Readings from Last 3 Encounters:   08/07/20 61.2 kg (135 lb)   07/29/20 62.1 kg (137 lb)   07/22/20 63 kg (139 lb)                    Reviewed and updated as needed this visit by Provider         Review of Systems   Constitutional, HEENT, cardiovascular, pulmonary, gi and gu systems are negative, except as otherwise noted.      Objective    There were no vitals taken for this visit.  There is no height or weight on file to calculate BMI.  Physical Exam   GENERAL: healthy, alert and no distress  HENT: ear canals and TM's normal, nose and mouth without ulcers or lesions  NECK: no adenopathy, no asymmetry, masses, or scars and thyroid normal to palpation  RESP: lungs clear to auscultation - no rales, rhonchi or wheezes  CV: regular rates and rhythm and normal S1 S2, no S3 or S4  MS: no gross musculoskeletal defects noted, no edema  NEURO: Normal strength and tone and sensory  exam grossly normal    Diagnostic Test Results:  Results for orders placed or performed in visit on 08/07/20 (from the past 24 hour(s))   Basic metabolic panel   Result Value Ref Range    Sodium 129 (L) 133 - 144 mmol/L    Potassium 3.7 3.4 - 5.3 mmol/L    Chloride 96 94 - 109 mmol/L    Carbon Dioxide 28 20 - 32 mmol/L    Anion Gap 5 3 - 14 mmol/L    Glucose 115 (H) 70 - 99 mg/dL    Urea Nitrogen 10 7 - 30 mg/dL    Creatinine 0.59 0.52 - 1.04 mg/dL    GFR Estimate 84 >60 mL/min/[1.73_m2]    GFR Estimate If Black >90 >60 mL/min/[1.73_m2]    Calcium 9.3 8.5 - 10.1 mg/dL   CBC with platelets differential   Result Value Ref Range    WBC 7.6 4.0 - 11.0 10e9/L    RBC Count 4.59 3.8 - 5.2 10e12/L    Hemoglobin 14.0 11.7 - 15.7 g/dL    Hematocrit 41.1 35.0 - 47.0 %    MCV 90 78 - 100 fl    MCH 30.5 26.5 - 33.0 pg    MCHC 34.1 31.5 - 36.5 g/dL    RDW 13.4 10.0 - 15.0 %    Platelet Count 300 150 - 450 10e9/L    % Neutrophils 69.5 %    % Lymphocytes 18.5 %    % Monocytes 11.4 %    % Eosinophils 0.3 %    % Basophils 0.3 %    Absolute Neutrophil 5.3 1.6 - 8.3 10e9/L    Absolute Lymphocytes 1.4 0.8 - 5.3 10e9/L    Absolute Monocytes 0.9 0.0 - 1.3 10e9/L    Absolute Eosinophils 0.0 0.0 - 0.7 10e9/L    Absolute Basophils 0.0 0.0 - 0.2 10e9/L    Diff Method Automated Method            Assessment & Plan     1. Dizziness    - Basic metabolic panel  - CBC with platelets differential    2. Hyponatremia    - Basic metabolic panel; Future    3. Closed head injury, subsequent encounter           Patient Instructions   Labs today show that your sodium is a little low.    Increase sodium in diet - more salt on food is ok.  Drink Pedialyte or gatorade     Recheck lab again next week    IF sodium is normal on recheck and you are still dizzy the next step would be to re scan the head ( CT SCAN)      Return in about 4 days (around 8/11/2020) for Lab Work.    Julianna Hansen PA-C  [facility]

## 2020-08-12 DIAGNOSIS — E87.1 HYPONATREMIA: ICD-10-CM

## 2020-08-12 LAB
ANION GAP SERPL CALCULATED.3IONS-SCNC: 7 MMOL/L (ref 3–14)
BUN SERPL-MCNC: 8 MG/DL (ref 7–30)
CALCIUM SERPL-MCNC: 9.2 MG/DL (ref 8.5–10.1)
CHLORIDE SERPL-SCNC: 94 MMOL/L (ref 94–109)
CO2 SERPL-SCNC: 28 MMOL/L (ref 20–32)
CREAT SERPL-MCNC: 0.61 MG/DL (ref 0.52–1.04)
GFR SERPL CREATININE-BSD FRML MDRD: 84 ML/MIN/{1.73_M2}
GLUCOSE SERPL-MCNC: 96 MG/DL (ref 70–99)
POTASSIUM SERPL-SCNC: 3.3 MMOL/L (ref 3.4–5.3)
SODIUM SERPL-SCNC: 129 MMOL/L (ref 133–144)

## 2020-08-12 PROCEDURE — 80048 BASIC METABOLIC PNL TOTAL CA: CPT | Performed by: PHYSICIAN ASSISTANT

## 2020-08-12 PROCEDURE — 36415 COLL VENOUS BLD VENIPUNCTURE: CPT | Performed by: PHYSICIAN ASSISTANT

## 2020-08-13 DIAGNOSIS — F41.9 ANXIETY: ICD-10-CM

## 2020-08-13 NOTE — TELEPHONE ENCOUNTER
ativan      Last Written Prescription Date:  7/16/20  Last Fill Quantity: 30,   # refills: 0  Last Office Visit: 8/7/20  Future Office visit:    Next 5 appointments (look out 90 days)    Aug 31, 2020  9:20 AM CDT  PHYSICAL with Rio Ospina MD  [facility] [address] [place] [address] [id number] [phone]           Routing refill request to provider for review/approval because:  Drug not on the [facility], [facility] or [facility] refill protocol or controlled substance

## 2020-08-14 ENCOUNTER — TELEPHONE (OUTPATIENT)
Dept: INTERNAL MEDICINE | Facility: CLINIC | Age: 85
End: 2020-08-14

## 2020-08-14 RX ORDER — LORAZEPAM 0.5 MG/1
TABLET ORAL
Qty: 30 TABLET | Refills: 2 | Status: SHIPPED | OUTPATIENT
Start: 2020-08-14 | End: 2020-11-20

## 2020-08-14 NOTE — TELEPHONE ENCOUNTER
Spoke with patient.     Provider message was shared with patient.     Appointment was scheduled with PCP.     No further follow up needed at this time.

## 2020-08-14 NOTE — TELEPHONE ENCOUNTER
Spoke with patient today.     States she is still having symptoms at this time and wondering what her next steps are. Still having dizziness, Nausea.     Reviewed most recent lab with her. States she is increasing her fluid intake and using Pedialyte and still has not made a difference.     Looking over last OV note appears a follow up CT was wanted is symptoms still persisted. Pended for PCP review. Or what next steps does PCP want to make?

## 2020-08-14 NOTE — TELEPHONE ENCOUNTER
Reason for Call:  Other call back    Detailed comments: The patient called and stated that she recently had a fall on 7/22. She has been seen in the ED and has had several office visits with Julianna Hansen. She is still struggling with symptoms from the fall. She was told that there were no face to face appointments for today but she could be scheduled for a video visit or phone visit with one of the providers today. She would prefer a face to face and is wondering what she should do if her symptoms get worse. She would like a call back to discuss what the best course of action would be.     Phone Number Patient can be reached at: Home number on file [phone] (home)    Best Time: Any    Can we leave a detailed message on this number? YES    Call taken on 8/14/2020 at 10:58 AM by Michaela Seymour

## 2020-08-14 NOTE — TELEPHONE ENCOUNTER
Pt to see me in clinic next Monday 8/17/20 at 9:00 am for 20 min appt to address dizziness issue only. Please schedule appt and inform pt. Continue good hydration between now and then

## 2020-08-17 ENCOUNTER — OFFICE VISIT (OUTPATIENT)
Dept: INTERNAL MEDICINE | Facility: CLINIC | Age: 85
End: 2020-08-17
Payer: COMMERCIAL

## 2020-08-17 VITALS
TEMPERATURE: 98.3 F | WEIGHT: 136 LBS | DIASTOLIC BLOOD PRESSURE: 82 MMHG | BODY MASS INDEX: 28.42 KG/M2 | RESPIRATION RATE: 16 BRPM | OXYGEN SATURATION: 98 % | HEART RATE: 81 BPM | SYSTOLIC BLOOD PRESSURE: 152 MMHG

## 2020-08-17 DIAGNOSIS — R26.9 ABNORMAL GAIT: ICD-10-CM

## 2020-08-17 DIAGNOSIS — I10 ESSENTIAL HYPERTENSION, BENIGN: Primary | ICD-10-CM

## 2020-08-17 DIAGNOSIS — E87.1 HYPONATREMIA: ICD-10-CM

## 2020-08-17 DIAGNOSIS — I10 ESSENTIAL HYPERTENSION, BENIGN: ICD-10-CM

## 2020-08-17 PROCEDURE — 99214 OFFICE O/P EST MOD 30 MIN: CPT | Performed by: INTERNAL MEDICINE

## 2020-08-17 RX ORDER — IRBESARTAN 300 MG/1
300 TABLET ORAL DAILY
Qty: 30 TABLET | Refills: 11 | Status: SHIPPED | OUTPATIENT
Start: 2020-08-17 | End: 2020-12-18

## 2020-08-17 RX ORDER — IRBESARTAN 150 MG/1
150 TABLET ORAL AT BEDTIME
Qty: 30 TABLET | Refills: 11 | Status: SHIPPED | OUTPATIENT
Start: 2020-08-17 | End: 2020-08-17 | Stop reason: ALTCHOICE

## 2020-08-17 NOTE — PROGRESS NOTES
"Subjective     Kathy Braswell is a 85 year old female who presents to clinic today for the following health issues:    HPI     Chief Complaint   Patient presents with     Follow Up     on OV with Julianna Hansen on 07/29/2020 and 08/07/2020 for Fall and Dizziness     Dizziness     Fall     Pt's past medical history, family history, habits, medications and allergies were reviewed with the patient today.  See snap shot for  HCM status. Most recent lab results reviewed with pt. Problem list and histories reviewed & adjusted, as indicated.  Additional history as below:    Walker  Or cane when getting up at night to bathroom   No vertigo in bed.  No falls since ER visit 1 month ago.  CT at that time showed atrophy of the cerebral and cerebellar areas.  No mass, bleed, stroke besides the scalp hematoma from the fall.   Slight headache rarely.  Denies headache currently. No nasal congestion. No F/C. No cough, chest pain  or shortness of breath   Patient on irbesartan/hydrochlorothiazide for blood pressure control.  Recent sodium and potassium levels low.  Denies numbness/weakness in the extremities.  States her vision is good with use of glasses     Additional ROS:   Constitutional, HEENT, Cardiovascular, Pulmonary, GI and , Neuro, MSK and Psych review of systems/symptoms are otherwise negative or unchanged from previous, except as noted above.      OBJECTIVE:  BP (!) 152/82   Pulse 81   Temp 98.3  F (36.8  C) (Temporal)   Resp 16   Wt 61.7 kg (136 lb)   SpO2 98%   BMI 28.42 kg/m     Estimated body mass index is 28.42 kg/m  as calculated from the following:    Height as of 7/22/20: 1.473 m (4' 10\").    Weight as of this encounter: 61.7 kg (136 lb).  Eye: PERRL, EOMI. Wearing glasses  HENT: ear canals and TM's normal and nose and mouth without ulcers or lesions   Neck: no adenopathy. Thyroid normal to palpation. No bruits  Pulm: Lungs clear to auscultation   CV: Regular rates and rhythm  GI: Soft, nontender, Normal " active bowel sounds, No hepatosplenomegaly or masses palpable  Ext: Peripheral pulses intact. No edema.  Neuro: Normal strength and tone, sensory exam grossly normal.  Negative Romberg.  Patient does have slight difficulty with finger-nose-finger testing but able to do is doing slowly.  Slight shuffling gait with a cane.  Three-step turn.  No leaning left or right with ambulation.  No cogwheel rigidity.  No tremor    Assessment/Plan: (See plan discussion below for further details)  1. Essential hypertension, benign  Blood pressure elevated and need to make medication change due to hyponatremia.  Will stop hydrochlorothiazide and raise irbesartan dose.  Will recheck blood pressure in a week with nurse only appointment  - irbesartan (AVAPRO) 300 MG tablet; Take 1 tablet (300 mg) by mouth daily  Dispense: 30 tablet; Refill: 11    2. Hyponatremia  Likely related to hydrochlorothiazide.  This will be stopped and patient will have repeat BMP in 1 week  - Basic metabolic panel; Future    3. Abnormal gait  Possible mild effect of hyponatremia.  More likely related to cerebral/cerebellar atrophy.  Will start with physical therapy.  If not improving, will check MRI of the brain and refer to neurology as below  - [facility]; Future    Plan discussion:   Stop Irbesartan/hydrochlorathiazide combo pill   Start Irbesartan 300mg tab, 1 tab daily in AM for blood pressure   [facility] Also get a nurse only blood pressure check then   Call update  to nurseline [phone] in 1 week  regardingg  symptoms and if better with med change   Referral to physical therapy ([facility] or [facility]) for walking/balance. Schedulers will call to schedule.  If gait not improved with physical therapy, will consider MRI of the brain to look further into cerebellar pathology that would not have been seen as clearly with CT of the brain and refer to neurology       Rio ZHONG  MD Anai  Internal Medicine Department  [facility]    (Chart documentation was completed, in part, with [employer] voice-recognition software. Even though reviewed, some grammatical, spelling, and word errors may remain.)

## 2020-08-17 NOTE — PATIENT INSTRUCTIONS
Stop Irbesartan/hydrochlorathiazide combo pill   Start Irbesartan 300mg tab, 1 tab daily in AM for blood pressure   Nonfasting blood  test in 1 week with outdoor [facility] clinic. Also get a nurse only blood pressure check then   Call update  to nurseline [phone] in 1 week  regarding  symptoms and if better with med change   Referral to physical therapy ([facility] or [facility]) for walking/balance . Schedulers will call to schedule

## 2020-08-17 NOTE — TELEPHONE ENCOUNTER
Called for a wellness screening before echo appt tomorrow.  Patient informed me that she was not coming to appt.

## 2020-08-18 ENCOUNTER — TELEPHONE (OUTPATIENT)
Dept: INTERNAL MEDICINE | Facility: CLINIC | Age: 85
End: 2020-08-18

## 2020-08-18 RX ORDER — IRBESARTAN 150 MG/1
TABLET ORAL
Qty: 90 TABLET | OUTPATIENT
Start: 2020-08-18

## 2020-08-18 RX ORDER — IRBESARTAN 300 MG/1
TABLET ORAL
Qty: 90 TABLET | OUTPATIENT
Start: 2020-08-18

## 2020-08-18 NOTE — TELEPHONE ENCOUNTER
Call back from patient today.     Having some memory concerns and memory loss.     States she just wanted to make sure her provider is aware of this. She forgot to talk about this with her provider during OV yesterday and just wanted to make sure this is on her chart.     There is no specific incident and is not concerned about safety, just wanted to make sure her provider is aware.     FYI to provider.

## 2020-08-19 DIAGNOSIS — F41.9 ANXIETY: ICD-10-CM

## 2020-08-19 NOTE — TELEPHONE ENCOUNTER
Routing refill request to provider for review/approval because:  Drug interaction warning / allergy contraindication

## 2020-08-24 ENCOUNTER — ALLIED HEALTH/NURSE VISIT (OUTPATIENT)
Dept: NURSING | Facility: CLINIC | Age: 85
End: 2020-08-24
Payer: COMMERCIAL

## 2020-08-24 VITALS — DIASTOLIC BLOOD PRESSURE: 86 MMHG | HEART RATE: 79 BPM | SYSTOLIC BLOOD PRESSURE: 160 MMHG

## 2020-08-24 DIAGNOSIS — E87.1 HYPONATREMIA: ICD-10-CM

## 2020-08-24 DIAGNOSIS — Z01.30 BP CHECK: Primary | ICD-10-CM

## 2020-08-24 PROCEDURE — 99207 ZZC NO CHARGE NURSE ONLY: CPT

## 2020-08-24 PROCEDURE — 80048 BASIC METABOLIC PNL TOTAL CA: CPT | Performed by: INTERNAL MEDICINE

## 2020-08-24 PROCEDURE — 36415 COLL VENOUS BLD VENIPUNCTURE: CPT | Performed by: INTERNAL MEDICINE

## 2020-08-24 NOTE — PROGRESS NOTES
"Kathy Braswell is a 85 year old female who is being evaluated via a billable telephone visit.      The patient has been notified of following:     \"This telephone visit will be conducted via a call between you and your physician/provider. We have found that certain health care needs can be provided without the need for a physical exam.  This service lets us provide the care you need with a short phone conversation.  If a prescription is necessary we can send it directly to your pharmacy.  If lab work is needed we can place an order for that and you can then stop by our lab to have the test done at a later time.    Telephone visits are billed at different rates depending on your insurance coverage. During this emergency period, for some insurers they may be billed the same as an in-person visit.  Please reach out to your insurance provider with any questions.    If during the course of the call the physician/provider feels a telephone visit is not appropriate, you will not be charged for this service.\"    Patient has given verbal consent for Telephone visit?  Yes    What phone number would you like to be contacted at? [phone]    How would you like to obtain your AVS? Mail     Subjective     Kathy Braswell is a 85 year old female who presents via phone visit today for the following health issues:    HPI  Chief Complaint   Patient presents with     Hypertension     Medication Question     Regarding Irbersartan     Hypertension Follow-up      Do you check your blood pressure regularly outside of the clinic? Yes     Are you following a low salt diet? Yes    Are your blood pressures ever more than 140 on the top number (systolic) OR more   than 90 on the bottom number (diastolic), for example 140/90? Yes-On 08/24/2020 Patient's B/P was 190 then went down to 160      How many servings of fruits and vegetables do you eat daily?  2-3    On average, how many sweetened beverages do you drink each day (Examples: soda, juice, " sweet tea, etc.  Do NOT count diet or artificially sweetened beverages)?   2-3    How many days per week do you exercise enough to make your heart beat faster? 3 or less    How many minutes a day do you exercise enough to make your heart beat faster? 9 or less    How many days per week do you miss taking your medication? 0       Due to the current impact of the Covid19 virus and recommendations by [facility] administration, CDC, etc to limit  clinic visits and pt exposure risk, was recommend pt proceed with virtual phone visit to address acute/stable  medical needs with plan to defer other non-acute health maintenance issues/exam to a future date when less self-isolation is required.    I have reviewed the nursing note as documented above.   See below for other information/data  and my personal notes capturing the substance of my conversation with the patient.       HPI:    Previous hyponatremia and hypokalemia with hydrochlorathiazide.  Sodium now normal and potassium minimally low at 3.3 since stopping hydrochlorothiazide.  Patient states she eats 1 banana a day.  Irbesartan dose was increased to 300 g daily.  Nurse only blood pressure check was elevated at 160/86 with this treatment but patient states she was nervous having her blood pressure checked then.  Patient son is purchasing a home blood pressure monitor for her and she will be getting that later today per her report.  Denies chest pain, shortness of breath or headaches.  Has some chronic unsteadiness and using a walker.  Was referred to physical therapy last week but patient has not made an appointment yet.  Was concerned about getting to [place] or [facility] as the [facility] physical therapy site was not open at that time but I informed the patient today that may are not open as of yesterday and she may call to set up a physical therapy appointment here at the clinic now.  Patient has a follow-up appointment scheduled with me next Monday also      Additional ROS:    Constitutional, HEENT, Cardiovascular, Pulmonary, GI and , Neuro, MSK and Psych review of systems/symptoms are otherwise negative or unchanged from previous, except as noted above.      ASSESSMENT:   1. Essential hypertension, benign  Recent nurse only blood pressure check still elevated with irbesartan though possible whitecoat anxiety if she will also.  Patient just got a home blood pressure monitor and will check blood pressure in the morning and evening over the next week and will write those results down and bring them with her to appointment with me next week.  If blood pressures remain elevated, will add amlodipine    2. Hypokalemia  Possible residual effect from previous hydrochlorothiazide versus baseline mild deficiency.  Will recheck potassium level next week when patient seen back in clinic and add supplement if remains well    3. Abnormal gait  Using a walker.  Has not fallen in the past week.  Patient will contact  [facility] to schedule PT at [facility]  site      PLAN:  Continue current medication  Check blood pressure in the morning and evening for the next week and write those results down and bring the results to your appointment with me next week in clinic  Contact [place] [facility] ([facility]) at [phone] to schedule physical therapy appointment at the [facility]  Site  Will recheck potassium level when you are seen back in clinic next week    Phone call contact time  Call Started at 4:31pm  Call Ended at 4:41pm  Total minutes: 10 min    (Chart documentation was completed, in part, with [employer] voice-recognition software. Even though reviewed, some grammatical, spelling, and word errors may remain.)    Rio Ospina MD  Internal Medicine Department  [facility]

## 2020-08-24 NOTE — PROGRESS NOTES
I met with Kathy Braswell at the request of  to recheck her blood pressure.  Blood pressure medications on the med list were reviewed with patient.    Patient has taken all medications as per usual regimen: yes  Patient reports tolerating them without any issues or concerns: yes    Vitals:    08/24/20 0955   BP: (!) 183/80   BP Location: Right arm   Patient Position: Sitting   Cuff Size: Adult Regular   Pulse: 79       After 5 minutes, the patient's blood pressure remained greater than or equal to 140/90.    Is the patient currently having any chest pain? No  Does the patient currently have a headache? No  Does the patient currently have any vision changes? No  Does the patient currently have any nausea? No  Does the patient currently have any abdominal pain? No    The blood pressure is less than 165/100. The previous encounter note was reviewed. The patient denies the above symptoms. The patient was scheduled for a follow up appointment on 08/31/2020 and 08/25/2020 with  . The note will be sent to the provider for review.

## 2020-08-25 ENCOUNTER — VIRTUAL VISIT (OUTPATIENT)
Dept: INTERNAL MEDICINE | Facility: CLINIC | Age: 85
End: 2020-08-25
Payer: COMMERCIAL

## 2020-08-25 DIAGNOSIS — I10 ESSENTIAL HYPERTENSION, BENIGN: Primary | ICD-10-CM

## 2020-08-25 DIAGNOSIS — E87.6 HYPOKALEMIA: ICD-10-CM

## 2020-08-25 DIAGNOSIS — R26.9 ABNORMAL GAIT: ICD-10-CM

## 2020-08-25 PROBLEM — E87.1 HYPONATREMIA: Status: RESOLVED | Noted: 2020-08-17 | Resolved: 2020-08-25

## 2020-08-25 LAB
ANION GAP SERPL CALCULATED.3IONS-SCNC: 8 MMOL/L (ref 3–14)
BUN SERPL-MCNC: 8 MG/DL (ref 7–30)
CALCIUM SERPL-MCNC: 9.2 MG/DL (ref 8.5–10.1)
CHLORIDE SERPL-SCNC: 101 MMOL/L (ref 94–109)
CO2 SERPL-SCNC: 25 MMOL/L (ref 20–32)
CREAT SERPL-MCNC: 0.54 MG/DL (ref 0.52–1.04)
GFR SERPL CREATININE-BSD FRML MDRD: 87 ML/MIN/{1.73_M2}
GLUCOSE SERPL-MCNC: 102 MG/DL (ref 70–99)
POTASSIUM SERPL-SCNC: 3.3 MMOL/L (ref 3.4–5.3)
SODIUM SERPL-SCNC: 134 MMOL/L (ref 133–144)

## 2020-08-25 PROCEDURE — 99214 OFFICE O/P EST MOD 30 MIN: CPT | Mod: TEL | Performed by: INTERNAL MEDICINE

## 2020-08-25 ASSESSMENT — PATIENT HEALTH QUESTIONNAIRE - PHQ9: SUM OF ALL RESPONSES TO PHQ QUESTIONS 1-9: 9

## 2020-08-25 NOTE — PATIENT INSTRUCTIONS
Continue current medication  Check blood pressure in the morning and evening for the next week and write those results down and bring the results to your appointment with me next week in clinic  Contact [place] [facility] ([facility]) at [phone] to schedule physical therapy appointment at the [facility]  Site  Will recheck potassium level when you are seen back in clinic next week

## 2020-08-31 ENCOUNTER — OFFICE VISIT (OUTPATIENT)
Dept: INTERNAL MEDICINE | Facility: CLINIC | Age: 85
End: 2020-08-31
Payer: COMMERCIAL

## 2020-08-31 VITALS
TEMPERATURE: 97.9 F | RESPIRATION RATE: 16 BRPM | HEART RATE: 82 BPM | BODY MASS INDEX: 28.22 KG/M2 | WEIGHT: 135 LBS | DIASTOLIC BLOOD PRESSURE: 88 MMHG | OXYGEN SATURATION: 97 % | SYSTOLIC BLOOD PRESSURE: 148 MMHG

## 2020-08-31 DIAGNOSIS — I10 ESSENTIAL HYPERTENSION, BENIGN: ICD-10-CM

## 2020-08-31 DIAGNOSIS — I10 ESSENTIAL HYPERTENSION, BENIGN: Primary | ICD-10-CM

## 2020-08-31 DIAGNOSIS — E87.6 HYPOKALEMIA: ICD-10-CM

## 2020-08-31 DIAGNOSIS — R26.9 ABNORMAL GAIT: ICD-10-CM

## 2020-08-31 LAB — POTASSIUM SERPL-SCNC: 3.5 MMOL/L (ref 3.4–5.3)

## 2020-08-31 PROCEDURE — 84132 ASSAY OF SERUM POTASSIUM: CPT | Performed by: INTERNAL MEDICINE

## 2020-08-31 PROCEDURE — 36415 COLL VENOUS BLD VENIPUNCTURE: CPT | Performed by: INTERNAL MEDICINE

## 2020-08-31 PROCEDURE — 99214 OFFICE O/P EST MOD 30 MIN: CPT | Performed by: INTERNAL MEDICINE

## 2020-08-31 RX ORDER — AMLODIPINE BESYLATE 5 MG/1
5 TABLET ORAL DAILY
Qty: 30 TABLET | Refills: 11 | Status: SHIPPED | OUTPATIENT
Start: 2020-08-31 | End: 2020-12-18

## 2020-08-31 RX ORDER — AMLODIPINE BESYLATE 5 MG/1
TABLET ORAL
Qty: 90 TABLET | OUTPATIENT
Start: 2020-08-31

## 2020-08-31 NOTE — TELEPHONE ENCOUNTER
[employer] request for 90 day supply rejected. Rx was specifically written for smaller quantity and would have been written for larger amount at time of prescription if medically appropriate

## 2020-08-31 NOTE — LETTER
[facility]  [address] [place] [address]  [phone]      8/31/2020       Kathy Braswell  [address]  [facility] [address]        Dear Kathy,  Here are your most recent lab results. Unless commented on below, mild variation of results  outside the normal range are not clinically signicant.    Resulted Orders   Potassium   Result Value Ref Range    Potassium 3.5 3.4 - 5.3 mmol/L       Potassium lab results were normal.  No further recommendations at this time.  Continue management recommendations as discussed at your most recent appointment.    If you have further questions/concerns regarding the results, I would ask that you bring them to your next follow-up appointment with me and I would be happy to review them with you further.      Sincerely,      Rio Ospina MD  Internal Medicine

## 2020-08-31 NOTE — PROGRESS NOTES
"Subjective     Kathy Braswell is a 85 year old female who presents to clinic today for the following health issues:    HPI   Chief Complaint   Patient presents with     Follow Up     for VV on 08/25/2020, Fall and Lab Results      Pt's past medical history, family history, habits, medications and allergies were reviewed with the patient today.  See snap shot for  HCM status. Most recent lab results reviewed with pt. Problem list and histories reviewed & adjusted, as indicated.  Additional history as below:     BPs at home /90, 159/82, 153/90   BS at home /72, 125/77, 138/74  Denies chest pain, shortness of breath, abdominal pain, headache, vision changes   Has not made appointment yet for physical therapy with TAN[facility].  Referral has been placed  Hyponatremia has resolved off of hydrochlorothiazide.  Potassium 1 week ago still minimally low.  Patient on irbesartan 300 mg daily in the morning for blood pressure.  Effect is not lasting 24 hours and blood pressure rising in the a.m.  Has some daytime fatigue.  Thyroid and hemoglobin levels okay.  Patient has nocturia 2-3 times a night.  Drinking regular coffee and has about 4 cups a day with last coffee around suppertime.  No dysuria or hematuria.  Ambulating with a cane.  No recent falls.  Patient nervous all over about her walking.  Chronic anxiety, on meds and stable     Additional ROS:   Constitutional, HEENT, Cardiovascular, Pulmonary, GI and , Neuro, MSK and Psych review of systems/symptoms are otherwise negative or unchanged from previous, except as noted above.      OBJECTIVE:  BP (!) 148/88   Pulse 82   Temp 97.9  F (36.6  C) (Temporal)   Resp 16   Wt 61.2 kg (135 lb)   SpO2 97%   BMI 28.22 kg/m     Estimated body mass index is 28.22 kg/m  as calculated from the following:    Height as of 7/22/20: 1.473 m (4' 10\").    Weight as of this encounter: 61.2 kg (135 lb).     Neck: no adenopathy. Thyroid normal to palpation. No bruits  Pulm: Lungs clear " to auscultation   CV: Regular rates and rhythm  GI: Soft, nontender, Normal active bowel sounds, No hepatosplenomegaly or masses palpable  Ext: Peripheral pulses intact. No edema.  Neuro: Normal strength to handgrip and upper/lower extremities testing.  Able to stand by herself from a chair with pushing off with arms.  Normal tone.  Sensory exam grossly normal.  No cogwheel rigidity.  Minimal shuffling gait with use of a cane.  No tremor    Assessment/Plan: (See plan discussion below for further details)  1. Essential hypertension, benign  Blood pressure high in the morning but normal later in the day.  We will continue morning irbesartan and add amlodipine in the evening.  Patient will update me in a week regarding blood pressure status  - amLODIPine (NORVASC) 5 MG tablet; Take 1 tablet (5 mg) by mouth daily  Dispense: 30 tablet; Refill: 11    2. Hypokalemia  Off of hydrochlorothiazide now.  Minimally low a week ago.  Will recheck today now that it is further out from hydrochlorothiazide being discontinued.  If remains low, will add oral potassium supplement tab  - Potassium    3. Abnormal gait  Patient ambulating with a cane.  No recent fall.  Patient has good strength on exam.  Seems some of this is potentially anxiety related also.  Did not appreciate cogwheel rigidity for Parkinson's to be present and no tremor.  Patient will start physical therapy as ordered    Plan discussion:   Lab for potassium today   Start Amlodipine 5mg tab, 1 tab daily at supper for blood pressure   Continue other medications  In 1 week, then check blood pressure in AM and PM for 3 days and then call results to the nurseline [phone]   Change coffee to decaf and not drink after noon to limit effect on urination later in the day/night   Avoid fluid intake as able after supper   Empty bladder before bedtime  Contact [facility] ([facility]) at [phone] to schedule physical therapy appointment at the [facility]  TANIA Ospina MD  Internal Medicine Department  [facility]    (Chart documentation was completed, in part, with [employer] voice-recognition software. Even though reviewed, some grammatical, spelling, and word errors may remain.)

## 2020-08-31 NOTE — PATIENT INSTRUCTIONS
Lab for potassium today   Start Amlodipine 5mg tab, 1 tab daily at supper for blood pressure   Continue other medications  In 1 week, then check blood pressure in AM and PM for 3 days and then call results to the nurseline [phone]   Change coffee to decaf and not drink after noon to limit effect on urination later in the day/night   Avoid fluid intake as able after supper   Empty bladder before bedtime  Contact [facility] ([facility]) at [phone] to schedule physical therapy appointment at the [facility]

## 2020-09-02 ENCOUNTER — THERAPY VISIT (OUTPATIENT)
Dept: PHYSICAL THERAPY | Facility: CLINIC | Age: 85
End: 2020-09-02
Payer: COMMERCIAL

## 2020-09-02 DIAGNOSIS — R26.9 ABNORMAL GAIT: Primary | ICD-10-CM

## 2020-09-02 PROCEDURE — 97161 PT EVAL LOW COMPLEX 20 MIN: CPT | Mod: GP | Performed by: PHYSICAL THERAPIST

## 2020-09-02 PROCEDURE — 97110 THERAPEUTIC EXERCISES: CPT | Mod: GP | Performed by: PHYSICAL THERAPIST

## 2020-09-02 NOTE — PROGRESS NOTES
"[facility] Initial Evaluation  Subjective:  The history is provided by the patient and a relative. No  was used.   Therapist Generated HPI Evaluation  Problem details: Patient fell 7-22-20 transitioning to standing when gardening, fell backwards and hit head on sidewalk.  She did not lose consciousness, needed \"a few stitches\" in her head.  \"I am not a kasandra, but I do feel like my balance needs work.\"  Since then she has been using a 2-wheel walker in the home majority of the time but has started transitioning away from and SEC when she is outside the home.  She was living in a home by herselft, 2 stairs with railing to get in, flight of stairs to basement.  She was independent with household chores, driving and errands.  After her fall her sister moved in to help her.  She stopped driving.  She is now navigating stairs with railing sideways since the fall.  Patient's goal is to be able to drive by herself and be able to go to the store with/without son.  Her son has been doing all the shopping since the fall.  Patient has been in the house the majority of the time since the fall, sister now doing majority of housework, patient is doing light housework and  doing light meal prep.  Patient has been taking sponge baths since the fall - patient was taking baths prior to the fall (with assistance of her sister for the transfers at least a year).  Bathroom does not have grab bars and she does not have a shower.  She is independent with sit-stand transfers, except for first thing in the a.m. and during the night for restroom trips when her sister is always next to her side.  She denies any falls this year except for the one in the garden.  .                        Symptoms are exacerbated by nothing  and relieved by nothing.                                        Patient Health History           General health as reported by patient is good.  Pertinent medical history includes: " "high blood pressure, incontinence and depression.   Red flags:  None as reported by patient.  Medical allergies: none.   Surgeries include:  None.    Current medications:  Anti-depressants, sleep medication and high blood pressure medication.    Current occupation is Retired.                                       Objective:    Gait:  Patient ambulates with shortened step length, \"choppy\" steps, flat-footed gait and minimal trunk rotation; arrived using a SEC that was too tall for her              Physical Exam    General     ROS  Timed Up an Go Test:  20 seconds     Sit-stand and stand-sit transfer with minimal assist bilateral UEs, but does not have the strength to control final stage of sitting down and lands in the chair forcefully     SLS:  Unable to do right or left without bilateral UE support     MMT:  Bilateral hip flexion, abduction and extension 4/5, knee flexion and extension 4+/5, ankle DF 4+/5, PF 4/5.      Assessment/Plan:    Patient is a 85 year old female with weakness/balance complaints and fear since fall in July.  Her sister has moved in with her and she immediately stopped driving, going out of the home and started using walker/SEC.  Patient appears to have some memory issues and her sister helped with subjective exam.  She was started with a home program to start to work on balance, strength and gait.  Patient has the following significant findings with corresponding treatment plan.                Diagnosis 1:  Abnormal gait/fall    Pain -  self management, education and home program  Decreased strength - therapeutic exercise, therapeutic activities and home program  Decreased proprioception - neuro re-education, gait training, therapeutic activities and home program  Impaired gait - gait training, assistive devices and home program  Decreased function - therapeutic activities and home program    Cumulative Therapy Evaluation is: Low complexity.    Previous and current functional limitations:  " (See Goal Flow Sheet for this information)    Short term and Long term goals: (See Goal Flow Sheet for this information)     Communication ability:  Patient appears to be able to clearly communicate and understand verbal and written communication and follow directions correctly.  Treatment Explanation - The following has been discussed with the patient:   RX ordered/plan of care  Anticipated outcomes  Possible risks and side effects  This patient would benefit from PT intervention to resume normal activities.   Rehab potential is good.    Frequency:  1 X week, once daily  Duration:  for 6 weeks  Discharge Plan:  Achieve all LTG.  Independent in home treatment program.  Reach maximal therapeutic benefit.    Please refer to the daily flowsheet for treatment today, total treatment time and time spent performing 1:1 timed codes.

## 2020-09-10 DIAGNOSIS — F41.9 ANXIETY: ICD-10-CM

## 2020-09-10 RX ORDER — BUSPIRONE HYDROCHLORIDE 15 MG/1
TABLET ORAL
Qty: 90 TABLET | Refills: 0 | Status: SHIPPED | OUTPATIENT
Start: 2020-09-10 | End: 2020-12-16

## 2020-09-14 ENCOUNTER — THERAPY VISIT (OUTPATIENT)
Dept: PHYSICAL THERAPY | Facility: CLINIC | Age: 85
End: 2020-09-14
Payer: COMMERCIAL

## 2020-09-14 DIAGNOSIS — R26.9 ABNORMAL GAIT: ICD-10-CM

## 2020-09-14 PROCEDURE — 97112 NEUROMUSCULAR REEDUCATION: CPT | Mod: GP | Performed by: PHYSICAL THERAPIST

## 2020-09-14 PROCEDURE — 97110 THERAPEUTIC EXERCISES: CPT | Mod: GP | Performed by: PHYSICAL THERAPIST

## 2020-09-14 PROCEDURE — 97530 THERAPEUTIC ACTIVITIES: CPT | Mod: GP | Performed by: PHYSICAL THERAPIST

## 2020-11-20 DIAGNOSIS — F41.9 ANXIETY: ICD-10-CM

## 2020-11-20 NOTE — TELEPHONE ENCOUNTER
LORazepam (ATIVAN) 0.5 MG tablet      Last Written Prescription Date:  8/14/20  Last Fill Quantity: 30,   # refills: 2  Last Office Visit: 8/31/20  Future Office visit:       Routing refill request to provider for review/approval because:  Drug not on the [facility], P or [facility] refill protocol or controlled substance

## 2020-11-21 RX ORDER — LORAZEPAM 0.5 MG/1
TABLET ORAL
Qty: 30 TABLET | Refills: 0 | Status: SHIPPED | OUTPATIENT
Start: 2020-11-21 | End: 2020-12-20

## 2020-11-21 NOTE — TELEPHONE ENCOUNTER
BP meds  Increased in August for high blood pressure.  Never had follow-up as requested. Call pt to schedule follow-up virtual video or tele visit with me o f/u on BP and anxiety control. Pt to check blood pressure BID for 3 days prior to appt with me. Lorazepam refilled for 30 days. Will address more longterm refill of med after appt

## 2020-12-15 DIAGNOSIS — F41.9 ANXIETY: ICD-10-CM

## 2020-12-16 RX ORDER — BUSPIRONE HYDROCHLORIDE 15 MG/1
TABLET ORAL
Qty: 90 TABLET | Refills: 1 | Status: SHIPPED | OUTPATIENT
Start: 2020-12-16 | End: 2020-12-18 | Stop reason: DRUGHIGH

## 2020-12-18 ENCOUNTER — OFFICE VISIT (OUTPATIENT)
Dept: INTERNAL MEDICINE | Facility: CLINIC | Age: 85
End: 2020-12-18
Payer: COMMERCIAL

## 2020-12-18 VITALS
HEART RATE: 90 BPM | SYSTOLIC BLOOD PRESSURE: 126 MMHG | TEMPERATURE: 98.1 F | RESPIRATION RATE: 16 BRPM | WEIGHT: 138 LBS | OXYGEN SATURATION: 98 % | DIASTOLIC BLOOD PRESSURE: 78 MMHG | BODY MASS INDEX: 28.84 KG/M2

## 2020-12-18 DIAGNOSIS — I10 ESSENTIAL HYPERTENSION, BENIGN: ICD-10-CM

## 2020-12-18 DIAGNOSIS — F32.0 MILD MAJOR DEPRESSION (H): ICD-10-CM

## 2020-12-18 DIAGNOSIS — F41.9 ANXIETY: ICD-10-CM

## 2020-12-18 PROCEDURE — 99214 OFFICE O/P EST MOD 30 MIN: CPT | Performed by: INTERNAL MEDICINE

## 2020-12-18 RX ORDER — IRBESARTAN 300 MG/1
300 TABLET ORAL DAILY
Qty: 90 TABLET | Refills: 3 | Status: SHIPPED | OUTPATIENT
Start: 2020-12-18 | End: 2021-08-30

## 2020-12-18 RX ORDER — AMLODIPINE BESYLATE 5 MG/1
5 TABLET ORAL DAILY
Qty: 90 TABLET | Refills: 3 | Status: SHIPPED | OUTPATIENT
Start: 2020-12-18 | End: 2021-08-30

## 2020-12-18 RX ORDER — BUSPIRONE HYDROCHLORIDE 15 MG/1
15 TABLET ORAL 2 TIMES DAILY
Qty: 180 TABLET | Refills: 3 | Status: SHIPPED | OUTPATIENT
Start: 2020-12-18 | End: 2021-08-30

## 2020-12-18 ASSESSMENT — PATIENT HEALTH QUESTIONNAIRE - PHQ9: SUM OF ALL RESPONSES TO PHQ QUESTIONS 1-9: 10

## 2020-12-18 NOTE — PROGRESS NOTES
"Subjective     Kathy Braswell is a 85 year old female who presents to clinic today for the following health issues:    HPI         Hypertension Follow-up      Do you check your blood pressure regularly outside of the clinic? Yes -Sometimes     Are you following a low salt diet? Yes    Are your blood pressures ever more than 140 on the top number (systolic) OR more   than 90 on the bottom number (diastolic), for example 140/90? Yes       Pt's past medical history, family history, habits, medications and allergies were reviewed with the patient today.  See snap shot for  HCM status. Most recent lab results reviewed with pt. Problem list and histories reviewed & adjusted, as indicated.  Additional history as below:     Also f/u re : anxiety ansd depression  Seen in August. Amlodipine added at that time for uncontrolled blood pressure but no prior f/u until now  Denies chest pain, shortness of breath, abdominal pain, headache, vision changes or side effects with medications.  On Fluoxetine. Buspar and Lorazepam for anxiety. Has not tolerated higher doses of Fluoxetine.  Taking 1 lorazepam tablet daily.  Talks with friends/sister about stress.  Stress worsened some with Covid pandemic and restrictions activity  P{HQ = 10. JUANPABLO = 4. No suicidal ideation. Declines counseling. Mental health issues chronic  Additional ROS:   Constitutional, HEENT, Cardiovascular, Pulmonary, GI and , Neuro, MSK and Psych review of systems/symptoms are otherwise negative or unchanged from previous, except as noted above.      OBJECTIVE:  /78   Pulse 90   Temp 98.1  F (36.7  C) (Temporal)   Resp 16   Wt 62.6 kg (138 lb)   SpO2 98%   BMI 28.84 kg/m     Estimated body mass index is 28.84 kg/m  as calculated from the following:    Height as of 7/22/20: 1.473 m (4' 10\").    Weight as of this encounter: 62.6 kg (138 lb).     Neck: no adenopathy. Thyroid normal to palpation. No bruits  Pulm: Lungs clear to auscultation   CV: Regular rates " and rhythm  GI: Soft, nontender, Normal active bowel sounds, No hepatosplenomegaly or masses palpable  Ext: Peripheral pulses intact. No edema.  Neuro: Normal strength and tone, sensory exam grossly normal  Gen: Slight anxious affect      Assessment/Plan: (See plan discussion below for further details)  1. Essential hypertension, benign  Controlled.  Continue current medication  - amLODIPine (NORVASC) 5 MG tablet; Take 1 tablet (5 mg) by mouth daily  Dispense: 90 tablet; Refill: 3  - irbesartan (AVAPRO) 300 MG tablet; Take 1 tablet (300 mg) by mouth daily  Dispense: 90 tablet; Refill: 3    2. Anxiety  Mild worsening.  Increase buspirone.  Continue lorazepam  - busPIRone (BUSPAR) 15 MG tablet; Take 1 tablet (15 mg) by mouth 2 times daily  Dispense: 180 tablet; Refill: 3  - LORazepam (ATIVAN) 0.5 MG tablet; TAKE 1 TABLET BY MOUTH DAILY   FOR ANXIETY  Dispense: 90 tablet; Refill: 1    3. Mild major depression (H)  Mild worsening.  No suicidal ideation.  Has been intolerant of higher doses of paroxetine previously.  Will monitor as anxiety improved with buspirone.  If not improving also, patient will inform physician and will try changing fluoxetine to Sertraline      Plan discussion:   Increase Buspirone to 15mg tab, 1 tab in AM  as usual and 1/2-1 tab in PM also for higher anxiety level now. Start  With  1/2 tab in PM  for 1-2 weeks and then increase if needed to 1 tab in PM for anxiety  Continue other meds.  See me in 6 months for follow-up of blood pressure or earlier if mental health not improving  May  refill of Lorazepam starting 12/20/20 from pharmacy       Rio Ospina MD  Internal Medicine Department  [facility]    (Chart documentation was completed, in part, with [employer] voice-recognition software. Even though reviewed, some grammatical, spelling, and word errors may remain.)

## 2020-12-18 NOTE — PATIENT INSTRUCTIONS
Increase Buspirone to 15mg tab, 1 tab in AM  as usual and 1/2-1 tab in PM also for higher anxiety level now. Start  With  1/2 tab in PM  for 1-2 weeks and then increase if needed to 1 tab in PM for anxiety  Continue other meds.  See me in 6 months for follow-up of blood pressure or earlier if mental health not improving  May  refill of Lorazepam starting 12/20/20 from pharmacy

## 2020-12-20 RX ORDER — LORAZEPAM 0.5 MG/1
TABLET ORAL
Qty: 90 TABLET | Refills: 1 | Status: SHIPPED | OUTPATIENT
Start: 2020-12-20 | End: 2021-07-02

## 2020-12-20 ASSESSMENT — ANXIETY QUESTIONNAIRES
1. FEELING NERVOUS, ANXIOUS, OR ON EDGE: SEVERAL DAYS
7. FEELING AFRAID AS IF SOMETHING AWFUL MIGHT HAPPEN: NOT AT ALL
IF YOU CHECKED OFF ANY PROBLEMS ON THIS QUESTIONNAIRE, HOW DIFFICULT HAVE THESE PROBLEMS MADE IT FOR YOU TO DO YOUR WORK, TAKE CARE OF THINGS AT HOME, OR GET ALONG WITH OTHER PEOPLE: SOMEWHAT DIFFICULT
GAD7 TOTAL SCORE: 4
5. BEING SO RESTLESS THAT IT IS HARD TO SIT STILL: NOT AT ALL
3. WORRYING TOO MUCH ABOUT DIFFERENT THINGS: SEVERAL DAYS
6. BECOMING EASILY ANNOYED OR IRRITABLE: NOT AT ALL
2. NOT BEING ABLE TO STOP OR CONTROL WORRYING: SEVERAL DAYS

## 2020-12-20 ASSESSMENT — PATIENT HEALTH QUESTIONNAIRE - PHQ9: 5. POOR APPETITE OR OVEREATING: SEVERAL DAYS

## 2020-12-21 ASSESSMENT — ANXIETY QUESTIONNAIRES: GAD7 TOTAL SCORE: 4

## 2021-01-18 ENCOUNTER — TELEPHONE (OUTPATIENT)
Dept: INTERNAL MEDICINE | Facility: CLINIC | Age: 86
End: 2021-01-18

## 2021-01-18 NOTE — TELEPHONE ENCOUNTER
Prior Authorization Retail Medication Request    Medication/Dose: LORazepam (ATIVAN) 0.5 MG tablet  ICD code (if different than what is on RX):  F41.9  Previously Tried and Failed:    Rationale:      Insurance Name:    Insurance ID:        Pharmacy Information (if different than what is on RX)  Name:  [facility]  Phone:

## 2021-01-20 NOTE — TELEPHONE ENCOUNTER
Prior Authorization Approval    Authorization Effective Date: 12/21/2020  Authorization Expiration Date: 1/20/2022  Medication: LORazepam (ATIVAN) 0.5 MG-APPROVED  Approved Dose/Quantity:    Reference #:     Insurance Company: [facility] - Phone [phone] Fax [phone]  Expected CoPay:       CoPay Card Available:      Foundation Assistance Needed:    Which Pharmacy is filling the prescription (Not needed for infusion/clinic administered): [facility] #[id number] - [place], [place] - [address]  Pharmacy Notified: Yes  Patient Notified: Yes  **Instructed pharmacy to notify patient when script is ready to /ship.**

## 2021-01-20 NOTE — TELEPHONE ENCOUNTER
Central Prior Authorization Team   Phone: [phone]    PA Initiation    Medication: LORazepam (ATIVAN) 0.5 MG tablet  Insurance Company: [facility] - Phone [phone] Fax [phone]  Pharmacy Filling the Rx: [facility] #[id number] - [place], [place] - [address]  Filling Pharmacy Phone: [phone]  Filling Pharmacy Fax: [phone]  Start Date: 1/20/2021

## 2021-03-01 ENCOUNTER — IMMUNIZATION (OUTPATIENT)
Dept: NURSING | Facility: CLINIC | Age: 86
End: 2021-03-01
Payer: COMMERCIAL

## 2021-03-01 PROCEDURE — 0001A PR COVID VAC PFIZER DIL RECON 30 MCG/0.3 ML IM: CPT

## 2021-03-01 PROCEDURE — 91300 PR COVID VAC PFIZER DIL RECON 30 MCG/0.3 ML IM: CPT

## 2021-03-11 DIAGNOSIS — G47.00 INSOMNIA, UNSPECIFIED TYPE: ICD-10-CM

## 2021-03-13 RX ORDER — ZOLPIDEM TARTRATE 6.25 MG/1
TABLET, FILM COATED, EXTENDED RELEASE ORAL
Qty: 90 TABLET | Refills: 0 | Status: SHIPPED | OUTPATIENT
Start: 2021-03-13 | End: 2021-08-30

## 2021-03-15 ENCOUNTER — TELEPHONE (OUTPATIENT)
Dept: INTERNAL MEDICINE | Facility: CLINIC | Age: 86
End: 2021-03-15

## 2021-03-15 NOTE — TELEPHONE ENCOUNTER
Prior Authorization Retail Medication Request    Medication/Dose: zolpidem ER (AMBIEN CR) 6.25 MG CR tablet  ICD code (if different than what is on RX):  G47.00  Previously Tried and Failed:    Rationale:      Insurance Name:  [employer]  Insurance ID:  [id number]      Pharmacy Information (if different than what is on RX)  Name:  [facility]  Phone:  [phone]

## 2021-03-16 NOTE — TELEPHONE ENCOUNTER
Central Prior Authorization Team   Phone: [phone]      PA Initiation    Medication: zolpidem ER (AMBIEN CR) 6.25 MG CR tablet-Initiated  Insurance Company: [facility] - Phone [phone] Fax [phone]  Pharmacy [facility]illing the Rx: [id number] - [place], [place] - [address]  Filling Pharmacy Phone: [phone]  Filling Pharmacy Fax:    Start Date: 3/16/2021

## 2021-03-16 NOTE — TELEPHONE ENCOUNTER
Prior Authorization Approval    Authorization Effective Date: 2/14/2021  Authorization Expiration Date: 3/16/2022  Medication: zolpidem ER (AMBIEN CR) 6.25 MG CR tablet-APPROVED  Approved Dose/Quantity:   Reference #:     Insurance Company: [facility] - Phone [phone] Fax [phone]  Expected CoPay:       CoPay Card Available:      Foundation Assistance Needed:    Which Pharmacy is filling the prescription (Not needed for infusion/clinic administered): [id number] - [place], [place] - [address]  Pharmacy Notified: Yes  Patient Notified: No    Pharmacy will notify patient when medication is ready.

## 2021-03-22 ENCOUNTER — IMMUNIZATION (OUTPATIENT)
Dept: NURSING | Facility: CLINIC | Age: 86
End: 2021-03-22
Attending: INTERNAL MEDICINE
Payer: COMMERCIAL

## 2021-03-22 PROCEDURE — 0002A PR COVID VAC PFIZER DIL RECON 30 MCG/0.3 ML IM: CPT

## 2021-03-22 PROCEDURE — 91300 PR COVID VAC PFIZER DIL RECON 30 MCG/0.3 ML IM: CPT

## 2021-03-25 PROBLEM — R26.9 ABNORMAL GAIT: Status: RESOLVED | Noted: 2020-08-17 | Resolved: 2021-03-25

## 2021-03-25 NOTE — PROGRESS NOTES
Subjective:  HPI  Physical Exam                    Objective:  System    Physical Exam    General     ROS    Assessment/Plan:    DISCHARGE REPORT    Progress reporting period is from 9-2-20 to 9-14-20, 2 visits.       SUBJECTIVE  At second visit patient reported she was doing exercises 1x/day as prescribed.  No issues.  Had not had any falls. Questions about driving and POC. Intermittently using SEC in home.  Had not gone out of home, son was picking up her groceries.  Patient's sister is SBA with all trips to the restroom during the night, and was staying with her except for a few trips back to her own apartment.     Pain level: (n/a).     Changes in function:  Unsure, had not tried walking anything besides in-home distances  Adverse reaction to treatment or activity: None    OBJECTIVE  At second visit reviewed and corrected HEP, progressed with strength and balance.    Ambulating with SEC, decreased liliam and trunk rotation.      ASSESSMENT/PLAN  Updated problem list and treatment plan: Diagnosis 1:  Abnormal gait/falls    Decreased strength - home program  Impaired balance - neuro re-education, therapeutic activities and home program  STG/LTGs have been met or progress has been made towards goals:  Potential to progress  Assessment of Progress: The patient's condition has potential to improve.  Self Management Plans:  Patient is independent in a home treatment program.      Recommendations:  Patient preferred to continue independently with HEP.  Will discharge PT at this time.    Please refer to the daily flowsheet for treatment today, total treatment time and time spent performing 1:1 timed codes.

## 2021-06-28 DIAGNOSIS — F41.9 ANXIETY: ICD-10-CM

## 2021-06-28 NOTE — LETTER
[facility]  [address] [address]  [phone]      7/7/2021       Kathy Braswell  [address]  [facility] [address]        Dear Kathy,   While refilling your prescription today, we noticed that you are due for a follow up appointment with Dr. Ospina. We will refill your prescription for 30 days. Please call to schedule to a follow up appointment with Dr. Ospina with in the next 30 days.     Taking care of your health is important to us and we look forward to seeing you in the near future.  Please call us at [phone] or [phone] (or use Scheduling Employee Scheduling Software) to schedule an appointment.     Please disregard this notice if you have already made an appointment.    Sincerely,  Dr Rio Ospina/Julianna Rodriguez, CMA

## 2021-06-29 NOTE — TELEPHONE ENCOUNTER
Requesting Ativan.    Last refill:    LORazepam (ATIVAN) 0.5 MG tablet 90 tablet 1 12/20/2020  No   Sig: TAKE 1 TABLET BY MOUTH DAILY   FOR ANXIETY   Sent to pharmacy as: LORazepam 0.5 MG Oral Tablet (ATIVAN)   Class: E-Prescribe     Please refill as appropriate.    May Francis RN  [facility]

## 2021-07-02 RX ORDER — LORAZEPAM 0.5 MG/1
TABLET ORAL
Qty: 30 TABLET | Refills: 0 | Status: SHIPPED | OUTPATIENT
Start: 2021-07-02 | End: 2021-08-09

## 2021-07-02 NOTE — TELEPHONE ENCOUNTER
Patient here for follow-up regarding anxiety/depression.  Lorazepam refilled for 30 days.  Call patient and get a follow-up appointment scheduled with me in the next 30 days.  Will address more long-term refills and medication as appropriate based on evaluation at follow-up appointment

## 2021-07-09 ENCOUNTER — TELEPHONE (OUTPATIENT)
Dept: INTERNAL MEDICINE | Facility: CLINIC | Age: 86
End: 2021-07-09

## 2021-07-09 DIAGNOSIS — Z78.0 ASYMPTOMATIC MENOPAUSAL STATE: ICD-10-CM

## 2021-07-09 DIAGNOSIS — Z13.820 SCREENING FOR OSTEOPOROSIS: Primary | ICD-10-CM

## 2021-08-05 ENCOUNTER — TELEPHONE (OUTPATIENT)
Dept: INTERNAL MEDICINE | Facility: CLINIC | Age: 86
End: 2021-08-05

## 2021-08-08 DIAGNOSIS — F41.9 ANXIETY: ICD-10-CM

## 2021-08-09 RX ORDER — LORAZEPAM 0.5 MG/1
TABLET ORAL
Qty: 30 TABLET | Refills: 0 | Status: SHIPPED | OUTPATIENT
Start: 2021-08-09 | End: 2021-08-30

## 2021-08-09 NOTE — TELEPHONE ENCOUNTER
Routing refill request to provider for review/approval because:  Drug not on the [facility] refill protocol     Lulu Shields, MSN, RN   [facility]

## 2021-08-09 NOTE — TELEPHONE ENCOUNTER
Has appointment scheduled with me on 8/30/2021. [place]  reviewed with last refill on 7/2/2021.  Refill approved

## 2021-08-30 ENCOUNTER — OFFICE VISIT (OUTPATIENT)
Dept: INTERNAL MEDICINE | Facility: CLINIC | Age: 86
End: 2021-08-30
Payer: COMMERCIAL

## 2021-08-30 VITALS
OXYGEN SATURATION: 98 % | BODY MASS INDEX: 27.8 KG/M2 | RESPIRATION RATE: 16 BRPM | DIASTOLIC BLOOD PRESSURE: 70 MMHG | TEMPERATURE: 97.9 F | HEART RATE: 88 BPM | SYSTOLIC BLOOD PRESSURE: 136 MMHG | WEIGHT: 133 LBS

## 2021-08-30 DIAGNOSIS — K59.00 CONSTIPATION, UNSPECIFIED CONSTIPATION TYPE: ICD-10-CM

## 2021-08-30 DIAGNOSIS — I10 ESSENTIAL HYPERTENSION, BENIGN: ICD-10-CM

## 2021-08-30 DIAGNOSIS — R35.1 NOCTURIA: ICD-10-CM

## 2021-08-30 DIAGNOSIS — F41.9 ANXIETY: ICD-10-CM

## 2021-08-30 DIAGNOSIS — F32.0 MILD MAJOR DEPRESSION (H): ICD-10-CM

## 2021-08-30 PROBLEM — E87.6 HYPOKALEMIA: Status: RESOLVED | Noted: 2020-08-31 | Resolved: 2021-08-30

## 2021-08-30 LAB
ANION GAP SERPL CALCULATED.3IONS-SCNC: 2 MMOL/L (ref 3–14)
BUN SERPL-MCNC: 12 MG/DL (ref 7–30)
CALCIUM SERPL-MCNC: 9.4 MG/DL (ref 8.5–10.1)
CHLORIDE BLD-SCNC: 105 MMOL/L (ref 94–109)
CO2 SERPL-SCNC: 31 MMOL/L (ref 20–32)
CREAT SERPL-MCNC: 0.65 MG/DL (ref 0.52–1.04)
GFR SERPL CREATININE-BSD FRML MDRD: 82 ML/MIN/1.73M2
GLUCOSE BLD-MCNC: 106 MG/DL (ref 70–99)
POTASSIUM BLD-SCNC: 3.6 MMOL/L (ref 3.4–5.3)
SODIUM SERPL-SCNC: 138 MMOL/L (ref 133–144)

## 2021-08-30 PROCEDURE — 99214 OFFICE O/P EST MOD 30 MIN: CPT | Performed by: INTERNAL MEDICINE

## 2021-08-30 PROCEDURE — 80048 BASIC METABOLIC PNL TOTAL CA: CPT | Performed by: INTERNAL MEDICINE

## 2021-08-30 PROCEDURE — 36415 COLL VENOUS BLD VENIPUNCTURE: CPT | Performed by: INTERNAL MEDICINE

## 2021-08-30 RX ORDER — IRBESARTAN 300 MG/1
300 TABLET ORAL DAILY
Qty: 90 TABLET | Refills: 3 | Status: SHIPPED | OUTPATIENT
Start: 2021-08-30 | End: 2022-08-14

## 2021-08-30 RX ORDER — BUSPIRONE HYDROCHLORIDE 15 MG/1
15 TABLET ORAL 2 TIMES DAILY
Qty: 180 TABLET | Refills: 3 | Status: SHIPPED | OUTPATIENT
Start: 2021-08-30 | End: 2022-08-14

## 2021-08-30 RX ORDER — AMLODIPINE BESYLATE 5 MG/1
5 TABLET ORAL DAILY
Qty: 90 TABLET | Refills: 3 | Status: SHIPPED | OUTPATIENT
Start: 2021-08-30 | End: 2022-08-14

## 2021-08-30 RX ORDER — LORAZEPAM 0.5 MG/1
TABLET ORAL
Qty: 90 TABLET | Refills: 1 | Status: SHIPPED | OUTPATIENT
Start: 2021-08-30 | End: 2022-03-16

## 2021-08-30 ASSESSMENT — ANXIETY QUESTIONNAIRES
2. NOT BEING ABLE TO STOP OR CONTROL WORRYING: SEVERAL DAYS
5. BEING SO RESTLESS THAT IT IS HARD TO SIT STILL: NOT AT ALL
GAD7 TOTAL SCORE: 3
1. FEELING NERVOUS, ANXIOUS, OR ON EDGE: SEVERAL DAYS
6. BECOMING EASILY ANNOYED OR IRRITABLE: NOT AT ALL
3. WORRYING TOO MUCH ABOUT DIFFERENT THINGS: SEVERAL DAYS
7. FEELING AFRAID AS IF SOMETHING AWFUL MIGHT HAPPEN: NOT AT ALL
IF YOU CHECKED OFF ANY PROBLEMS ON THIS QUESTIONNAIRE, HOW DIFFICULT HAVE THESE PROBLEMS MADE IT FOR YOU TO DO YOUR WORK, TAKE CARE OF THINGS AT HOME, OR GET ALONG WITH OTHER PEOPLE: NOT DIFFICULT AT ALL

## 2021-08-30 ASSESSMENT — PATIENT HEALTH QUESTIONNAIRE - PHQ9
SUM OF ALL RESPONSES TO PHQ QUESTIONS 1-9: 5
5. POOR APPETITE OR OVEREATING: NOT AT ALL

## 2021-08-30 NOTE — PROGRESS NOTES
ASSESSMENT:   1. Essential hypertension, benign  Controlled.  Continue current medication.  Lab as ordered  - Basic metabolic panel; Future  - Basic metabolic panel  - amLODIPine (NORVASC) 5 MG tablet; Take 1 tablet (5 mg) by mouth daily  Dispense: 90 tablet; Refill: 3  - irbesartan (AVAPRO) 300 MG tablet; Take 1 tablet (300 mg) by mouth daily  Dispense: 90 tablet; Refill: 3    2. Anxiety  Controlled overall.  Continue current medication  - busPIRone (BUSPAR) 15 MG tablet; Take 1 tablet (15 mg) by mouth 2 times daily  Dispense: 180 tablet; Refill: 3  - FLUoxetine (PROZAC) 20 MG capsule; TAKE 1 CAPSULE BY MOUTH EVERY DAY  Dispense: 90 capsule; Refill: 3  - LORazepam (ATIVAN) 0.5 MG tablet; Take 1 tab daily as needed for anxiety  Dispense: 90 tablet; Refill: 1    3. Mild major depression (H)  Overall controlled.  Patient still has some motivational issues and frustrations with Covid pandemic and less activity options.  Continue current medication.  Encourage patient to increase walking aerobic exercise and talking with friends, etc.  Patient offered referral for counseling declines at this time  - FLUoxetine (PROZAC) 20 MG capsule; TAKE 1 CAPSULE BY MOUTH EVERY DAY  Dispense: 90 capsule; Refill: 3    4. Constipation, unspecified constipation type  Denies blood in your stools.  Previous thyroid function normal.  May use Senokot as twice a day as needed for softening/stimulation    5. Nocturia  Denies dysuria.  Frequent caffeine use during the day.  This is likely interrupting sleep quality which can contribute to daytime fatigue and mental health.  Counseled to significantly reduce caffeine intake.  Defer medication therapy like Detrol at this time as will worsen constipation.      PLAN:   Change Senokot to Senokot-S, 1 tab twice a day as needed for constipation to help soften stool along with stimulation. May get over the counter   Increase walking exercise or leg exercises to help stimulate bowel function and to  help prevent pooling of fluid in the legs  Labs as ordered   Continue current meds  Prescriptions refilled on file. May refill Lorazepam again on 9/8/21.    I would recommend you receive an influenza (flu) vaccine  this Fall (early October)  Probable covid vaccine booster shot 8 months after your previous vaccine series *(late November). Will await further guidance from the CDC in September  Reduce caffeine intake by at least half to lessen urination at night and help sleep  See me in 6 months or earlier as needed   Inform MD mark santoro to work with a therapist/counselor in addition for mental health        (Chart documentation was completed, in part, with Torbit voice-recognition software. Even though reviewed, some grammatical, spelling, and word errors may remain.)    Rio Ospina MD  Internal Medicine Department  [facility]\    Lenny Chavez is a 86 year old who presents for the following health issues     HPI     Hypertension Follow-up      Do you check your blood pressure regularly outside of the clinic? No     Are you following a low salt diet? Yes    Are your blood pressures ever more than 140 on the top number (systolic) OR more   than 90 on the bottom number (diastolic), for example 140/90? N/A    Depression and Anxiety Follow-Up    How are you doing with your depression since your last visit? No change    How are you doing with your anxiety since your last visit?  No change    Are you having other symptoms that might be associated with depression or anxiety? No    Have you had a significant life event? No     Do you have any concerns with your use of alcohol or other drugs? No    Social History     Tobacco Use     Smoking status: Never Smoker     Smokeless tobacco: Never Used   Substance Use Topics     Alcohol use: Yes     Alcohol/week: 0.0 standard drinks     Comment: seldom     Drug use: No     PHQ 8/25/2020 12/18/2020 8/30/2021   PHQ-9 Total Score 9 10 5   Q9:  Thoughts of better off dead/self-harm past 2 weeks Not at all Not at all Not at all     JUANPABLO-7 SCORE 1/9/2020 12/20/2020 8/30/2021   Total Score - - -   Total Score 2 4 3     Last PHQ-9 8/30/2021   1.  Little interest or pleasure in doing things 1   2.  Feeling down, depressed, or hopeless 1   3.  Trouble falling or staying asleep, or sleeping too much 0   4.  Feeling tired or having little energy 1   5.  Poor appetite or overeating 0   6.  Feeling bad about yourself 1   7.  Trouble concentrating 1   8.  Moving slowly or restless 0   Q9: Thoughts of better off dead/self-harm past 2 weeks 0   PHQ-9 Total Score 5   Difficulty at work, home, or with people Not difficult at all     JUANPABLO-7  8/30/2021   1. Feeling nervous, anxious, or on edge 1   2. Not being able to stop or control worrying 1   3. Worrying too much about different things 1   4. Trouble relaxing 0   5. Being so restless that it is hard to sit still 0   6. Becoming easily annoyed or irritable 0   7. Feeling afraid, as if something awful might happen 0   JUANPABLO-7 Total Score 3   If you checked any problems, how difficult have they made it for you to do your work, take care of things at home, or get along with other people? Not difficult at all      Most recent lab results reviewed with pt.      Denies chest pain, shortness of breath, abdominal pain, headache, vision changes or side effects with medications.  Normal thyroid function 1 year ago  Weight down 5 pounds in 1 year  On fluoxetine and buspirone for anxiety along with lorazepam 1 tablet daily.  Has previously tried higher doses of fluoxetine but did not tolerate.     Mental health overall controlled as per JUANPABLO and PHQ above.  Anxiety symptoms chronic  Motivation down some.  Visits frequently with her sister who accompanies her today.  Has not been doing other exercise or activities much with Covid pandemic.  Bedtime about 10:30 AM. Up about 7 AM. No snoring   Up 3-5x/night for urination.   Has at least 3  "cups coffee in AM and 1-2 later in the day   Has some constipation, generally harder stool. Taking Senokot 2 times/day       Additional ROS:   Constitutional, HEENT, Cardiovascular, Pulmonary, GI and , Neuro, MSK and Psych review of systems/symptoms are otherwise negative or unchanged from previous, except as noted above.      OBJECTIVE:  /70   Pulse 88   Temp 97.9  F (36.6  C) (Temporal)   Resp 16   Wt 60.3 kg (133 lb)   SpO2 98%   BMI 27.80 kg/m     Estimated body mass index is 27.8 kg/m  as calculated from the following:    Height as of 7/22/20: 1.473 m (4' 10\").    Weight as of this encounter: 60.3 kg (133 lb).   Gen: Slight flattened affect (baseline)  Neck: no adenopathy. Thyroid normal to palpation. No bruits  Pulm: Lungs clear to auscultation   CV: Regular rates and rhythm  GI: Soft, nontender, Normal active bowel sounds, No hepatosplenomegaly or masses palpable  Ext: Peripheral pulses intact. Mild BLE edema.  Neuro: Normal strength and tone, sensory exam grossly normal            "

## 2021-08-30 NOTE — PATIENT INSTRUCTIONS
Change Senokot to Senokot-S, 1 tab twice a day as needed for constipation to help soften stool along with stimulation. May get over the counter   Increase walking exercise or leg exercises to help stimulate bowel function and to help prevent pooling of fluid in the legs  Labs as ordered   Continue current meds  Prescriptions refilled on file. May refill Lorazepam again on 9/8/21.    I would recommend you receive an influenza (flu) vaccine  this Fall (early October)  Probable covid vaccine booster shot 8 months after your previous vaccine series *(late November). Will await further guidance from the CDC in September  Reduce caffeine intake by at least half to lessen urination at night and help sleep  See me in 6 months or earlier as needed   Inform MD if wiling to work with a therapist/counselor in addition for mental health

## 2021-08-31 ASSESSMENT — ANXIETY QUESTIONNAIRES: GAD7 TOTAL SCORE: 3

## 2021-10-15 ENCOUNTER — TELEPHONE (OUTPATIENT)
Dept: INTERNAL MEDICINE | Facility: CLINIC | Age: 86
End: 2021-10-15

## 2021-10-15 NOTE — LETTER
[address] [address]  [phone]      10/15/2021       Kathy Braswell  [address]  [facility] [address]        Dear Kathy,  Your healthcare team cares about your health. To provide you with the best care,   we have reviewed your chart and based on our findings, we see that you are due to:     Annual Wellness Follow up-Please contact number listed above to schedule or schedule via JobSyn[employer].     If you have already completed these items, please contact the clinic via phone or   [employer] so your care team can review and update your records. Thank you for   choosing [facility] for your healthcare needs. For any questions,   concerns, or to schedule an appointment please contact the clinic.     Healthy Regards,    Your [facility] Care Team

## 2021-10-15 NOTE — TELEPHONE ENCOUNTER
Patient Quality Outreach      Summary:    Patient has the following on her problem list/HM:     Depression / Dysthymia review      6 Month Remission: 4-8 month window range: no  12 Month Remission: 10-14 month window range: no       PHQ-9 SCORE 8/25/2020 12/18/2020 8/30/2021   PHQ-9 Total Score - - -   PHQ-9 Total Score 9 10 5       If PHQ-9 recheck is 5 or more, route to provider for next steps.    Hypertension   Last three blood pressure readings:  BP Readings from Last 3 Encounters:   08/30/21 136/70   12/18/20 126/78   08/31/20 (!) 148/88     Blood pressure: Monitored    HTN Guidelines:  ? 139/89     Patient is due/failing the following:   Physical  - Due after 10/09/2019  Immunizations  -  Influenza    Type of outreach:    Sent letter.    Questions for provider review:    None                                                                                                                                     Julianna Rodriguez CMA       Chart routed to Care Team.

## 2021-12-27 ENCOUNTER — NURSE TRIAGE (OUTPATIENT)
Dept: INTERNAL MEDICINE | Facility: CLINIC | Age: 86
End: 2021-12-27
Payer: COMMERCIAL

## 2021-12-27 NOTE — TELEPHONE ENCOUNTER
"Pt called with constipation.     Triaged per Epic Triage Protocol, gave care advice which patient plans to follow.  See Care advice tab for more information.  Patent to call back if further questions or concerns.      Reason for Disposition    Mild constipation    Additional Information    Negative: Abdomen pain is the main symptom and adult male    Negative: Abdomen pain is the main symptom and adult female    Negative: Rectal bleeding or blood in stool is the main symptom    Negative: Patient sounds very sick or weak to the triager    Negative: Constant abdominal pain lasting > 2 hours    Negative: Vomiting bile (green color)    Negative: Vomiting and abdomen looks much more swollen than usual    Negative: Rectal pain or fullness from fecal impaction (rectum full of stool) and NOT better after SITZ bath, suppository or enema    Negative: Abdomen is more swollen than usual    Negative: Last bowel movement (BM) > 4 days ago    Negative: Leaking stool    Negative: Intermittent mild abdominal pain and fever    Negative: Unable to have a bowel movement (BM) without manually removing stool (using finger to pull out stool or perform disimpaction)    Negative: Unable to have a bowel movement (BM) without using a laxative, suppository, or enema    Negative: Constipation persists > 1 week and no improvement after using CARE ADVICE    Negative: Weight loss greater than 10 pounds (5 kg) and not dieting    Negative: Pencil-like, narrow stools    Negative: Patient wants to be seen    Answer Assessment - Initial Assessment Questions  1. STOOL PATTERN OR FREQUENCY: \"How often do you pass bowel movements (BMs)?\"  (Normal range: tid to q 3 days)  \"When was the last BM passed?\"        A couple days ago was last bm  2. STRAINING: \"Do you have to strain to have a BM?\"       Straining   3. RECTAL PAIN: \"Does your rectum hurt when the stool comes out?\" If so, ask: \"Do you have hemorrhoids? How bad is the pain?\"  (Scale 1-10; or mild, " "moderate, severe)      Yes when pushing  4. STOOL COMPOSITION: \"Are the stools hard?\"       yes  5. BLOOD ON STOOLS: \"Has there been any blood on the toilet tissue or on the surface of the BM?\" If so, ask: \"When was the last time?\"       no  6. CHRONIC CONSTIPATION: \"Is this a new problem for you?\"  If no, ask: \"How long have you had this problem?\" (days, weeks, months)       Yes have had but but not like this   7. CHANGES IN DIET: \"Have there been any recent changes in your diet?\"      no  8. MEDICATIONS: \"Have you been taking any new medications?\"      Sennakot - 6-8 tablets in 24 hour Senakot   9. LAXATIVES: \"Have you been using any laxatives or enemas?\"  If yes, ask \"What, how often, and when was the last time?\"      sennakot   10. CAUSE: \"What do you think is causing the constipation?\"         Who knows  11. OTHER SYMPTOMS: \"Do you have any other symptoms?\" (e.g., abdominal pain, fever, vomiting)       Denies abd pain   12. PREGNANCY: \"Is there any chance you are pregnant?\" \"When was your last menstrual period?\"        no    Protocols used: CONSTIPATION-A-OH      "

## 2022-01-11 ENCOUNTER — NURSE TRIAGE (OUTPATIENT)
Dept: INTERNAL MEDICINE | Facility: CLINIC | Age: 87
End: 2022-01-11
Payer: COMMERCIAL

## 2022-01-11 NOTE — TELEPHONE ENCOUNTER
"Patient called c/o urinary retention     1. SYMPTOM: \"What's the main symptom you're concerned about?\" (e.g., frequency, incontinence) hesitancy of urination feels the need and bladder fullness but cannot urinate - takes a long time to get more than a few drops out   2. ONSET: \"When did the difficulty start?\" 3 weeks ago - continuous   3. PAIN: \"Is there any pain?\" If so, ask: \"How bad is it?\" (Scale: 1-10; mild, moderate, severe) no pain with urination   4. CAUSE: \"What do you think is causing the symptoms?\" no fever, no flank pain, possibly UTI, but is constipation a lot   5. OTHER SYMPTOMS: \"Do you have any other symptoms?\" (e.g., fever, flank pain, blood in urine, pain with urination) denies     Did have a small BM yesterday, have been doing ducolax over the counter. Has been constipated for a while. But now having urinary retention     Per protocol, advised ED now.     Pt states she thinks she is hydrated. But plans to drink a large glass of water and if she is unable to urinate normally  after that agrees to go to ED     Bonny MARQUEZ Triage RN  [facility]     Reason for Disposition    Unable to urinate (or only a few drops) > 4 hours and bladder feels very full (e.g., palpable bladder or strong urge to urinate)    Additional Information    Negative: Shock suspected (e.g., cold/pale/clammy skin, too weak to stand, low BP, rapid pulse)    Negative: Sounds like a life-threatening emergency to the triager    Negative: Followed a genital area injury    Negative: Followed a genital area injury (penis, scrotum)    Negative: Vaginal discharge    Negative: Pus (white, yellow) or bloody discharge from end of penis    Negative: Discomfort (pain, burning or stinging) when passing urine and pregnant    Negative: Discomfort (pain, burning or stinging) when passing urine and female    Negative: Discomfort (pain, burning or stinging) when passing urine and male    Negative: Pain or itching " in the vulvar area    Negative: Pain in scrotum is main symptom    Negative: Blood in the urine is main symptom    Negative: Symptoms arising from use of a urinary catheter (Vargas or Coude)    Protocols used: URINARY SYMPTOMS-A-OH

## 2022-01-13 ENCOUNTER — OFFICE VISIT (OUTPATIENT)
Dept: INTERNAL MEDICINE | Facility: CLINIC | Age: 87
End: 2022-01-13
Payer: COMMERCIAL

## 2022-01-13 VITALS
WEIGHT: 131.1 LBS | DIASTOLIC BLOOD PRESSURE: 74 MMHG | OXYGEN SATURATION: 97 % | HEART RATE: 100 BPM | SYSTOLIC BLOOD PRESSURE: 126 MMHG | BODY MASS INDEX: 27.4 KG/M2 | RESPIRATION RATE: 16 BRPM | TEMPERATURE: 98 F

## 2022-01-13 DIAGNOSIS — N30.01 ACUTE CYSTITIS WITH HEMATURIA: ICD-10-CM

## 2022-01-13 DIAGNOSIS — R39.9 URINARY SYMPTOM OR SIGN: ICD-10-CM

## 2022-01-13 DIAGNOSIS — K59.00 CONSTIPATION, UNSPECIFIED CONSTIPATION TYPE: Primary | ICD-10-CM

## 2022-01-13 LAB
ALBUMIN UR-MCNC: NEGATIVE MG/DL
APPEARANCE UR: ABNORMAL
BACTERIA #/AREA URNS HPF: ABNORMAL /HPF
BILIRUB UR QL STRIP: NEGATIVE
COLOR UR AUTO: YELLOW
ERYTHROCYTE [DISTWIDTH] IN BLOOD BY AUTOMATED COUNT: 13.3 % (ref 10–15)
GLUCOSE UR STRIP-MCNC: NEGATIVE MG/DL
HCT VFR BLD AUTO: 43.3 % (ref 35–47)
HGB BLD-MCNC: 13.9 G/DL (ref 11.7–15.7)
HGB UR QL STRIP: ABNORMAL
KETONES UR STRIP-MCNC: ABNORMAL MG/DL
LEUKOCYTE ESTERASE UR QL STRIP: ABNORMAL
MCH RBC QN AUTO: 28.6 PG (ref 26.5–33)
MCHC RBC AUTO-ENTMCNC: 32.1 G/DL (ref 31.5–36.5)
MCV RBC AUTO: 89 FL (ref 78–100)
NITRATE UR QL: NEGATIVE
PH UR STRIP: 7 [PH] (ref 5–7)
PLATELET # BLD AUTO: 284 10E3/UL (ref 150–450)
RBC # BLD AUTO: 4.86 10E6/UL (ref 3.8–5.2)
RBC #/AREA URNS AUTO: ABNORMAL /HPF
SP GR UR STRIP: <=1.005 (ref 1–1.03)
SQUAMOUS #/AREA URNS AUTO: ABNORMAL /LPF
UROBILINOGEN UR STRIP-ACNC: 0.2 E.U./DL
WBC # BLD AUTO: 7.1 10E3/UL (ref 4–11)
WBC #/AREA URNS AUTO: ABNORMAL /HPF
WBC CLUMPS #/AREA URNS HPF: PRESENT /HPF

## 2022-01-13 PROCEDURE — 80053 COMPREHEN METABOLIC PANEL: CPT | Performed by: PHYSICIAN ASSISTANT

## 2022-01-13 PROCEDURE — 87086 URINE CULTURE/COLONY COUNT: CPT | Performed by: PHYSICIAN ASSISTANT

## 2022-01-13 PROCEDURE — 84443 ASSAY THYROID STIM HORMONE: CPT | Performed by: PHYSICIAN ASSISTANT

## 2022-01-13 PROCEDURE — 36415 COLL VENOUS BLD VENIPUNCTURE: CPT | Performed by: PHYSICIAN ASSISTANT

## 2022-01-13 PROCEDURE — 81001 URINALYSIS AUTO W/SCOPE: CPT | Performed by: PHYSICIAN ASSISTANT

## 2022-01-13 PROCEDURE — 99213 OFFICE O/P EST LOW 20 MIN: CPT | Performed by: PHYSICIAN ASSISTANT

## 2022-01-13 PROCEDURE — 85027 COMPLETE CBC AUTOMATED: CPT | Performed by: PHYSICIAN ASSISTANT

## 2022-01-13 RX ORDER — NITROFURANTOIN 25; 75 MG/1; MG/1
100 CAPSULE ORAL 2 TIMES DAILY
Qty: 10 CAPSULE | Refills: 0 | Status: SHIPPED | OUTPATIENT
Start: 2022-01-13 | End: 2022-01-18

## 2022-01-13 NOTE — PATIENT INSTRUCTIONS
Try metamucil daily     Make sure to get plenty of water and fiber in your diet    Stay as active as you can     If constipation persists, try Miralax as needed

## 2022-01-13 NOTE — PROGRESS NOTES
Assessment & Plan     Constipation, unspecified constipation type  - further work up as below  - reviewed lifestyle modifications, daily metamucil, and miralax as needed  - follow up with PCP if no improvement  - reviewed colonoscopy, pt is not wanting to continue screening for this  - CBC with platelets  - Comprehensive metabolic panel (BMP + Alb, Alk Phos, ALT, AST, Total. Bili, TP)  - TSH with free T4 reflex    Urinary symptom or sign  - UA with Microscopic reflex to Culture - lab collect; Future      No follow-ups on file.    SHANEL Brown [facility] BLANKA Chavez is a 86 year old who presents for the following health issues  accompanied by her son.    HPI     Constipation  Onset/Duration: 3 months or so   Description:  Frequency of bowel movements: 3-4 times per week  Consistency of stool: Sausage  Progression of Symptoms: same  Accompanying signs and symptoms:    Abdominal pain: no   Rectal pain: no   Blood in stool: no   Nausea/Vomiting: no   Weight loss or gain: no  History:   Similar problems in past: YES  History of abdominal surgery: no  Chronic laxative use:   New medications: no  Precipitating or alleviating factors:   Therapies tried and outcome: Senna, dulcolax     Patient notes she will feel the urge to go but then nothing moves.   Sennacot - was helping some, then recommend dlucolax - kind of works once in a while     - no weight changes   - no black or blood stool  - no appetite changes  - no night sweats or fever   - no medication changes  - no abdominal pain  - also reports delay with urination           Objective    /74   Pulse 100   Temp 98  F (36.7  C) (Tympanic)   Resp 16   Wt 59.5 kg (131 lb 1.6 oz)   SpO2 97%   BMI 27.40 kg/m    Body mass index is 27.4 kg/m .  Physical Exam   GENERAL: healthy, alert and no distress  ABDOMEN: soft, nontender, no hepatosplenomegaly, no masses and bowel sounds normal  RECTAL  normal  sphincter tone, no rectal masses

## 2022-01-14 LAB
ALBUMIN SERPL-MCNC: 3.9 G/DL (ref 3.4–5)
ALP SERPL-CCNC: 74 U/L (ref 40–150)
ALT SERPL W P-5'-P-CCNC: 17 U/L (ref 0–50)
ANION GAP SERPL CALCULATED.3IONS-SCNC: 4 MMOL/L (ref 3–14)
AST SERPL W P-5'-P-CCNC: 11 U/L (ref 0–45)
BACTERIA UR CULT: NORMAL
BILIRUB SERPL-MCNC: 0.4 MG/DL (ref 0.2–1.3)
BUN SERPL-MCNC: 5 MG/DL (ref 7–30)
CALCIUM SERPL-MCNC: 9.4 MG/DL (ref 8.5–10.1)
CHLORIDE BLD-SCNC: 103 MMOL/L (ref 94–109)
CO2 SERPL-SCNC: 28 MMOL/L (ref 20–32)
CREAT SERPL-MCNC: 0.66 MG/DL (ref 0.52–1.04)
GFR SERPL CREATININE-BSD FRML MDRD: 86 ML/MIN/1.73M2
GLUCOSE BLD-MCNC: 98 MG/DL (ref 70–99)
POTASSIUM BLD-SCNC: 5.3 MMOL/L (ref 3.4–5.3)
PROT SERPL-MCNC: 7.1 G/DL (ref 6.8–8.8)
SODIUM SERPL-SCNC: 135 MMOL/L (ref 133–144)
TSH SERPL DL<=0.005 MIU/L-ACNC: 2.62 MU/L (ref 0.4–4)

## 2022-01-17 ENCOUNTER — TELEPHONE (OUTPATIENT)
Dept: INTERNAL MEDICINE | Facility: CLINIC | Age: 87
End: 2022-01-17
Payer: COMMERCIAL

## 2022-01-17 DIAGNOSIS — K59.09 CHRONIC CONSTIPATION: Primary | ICD-10-CM

## 2022-01-17 NOTE — TELEPHONE ENCOUNTER
Dr. Blake, patient was not quite awake when you spoke to her., Please call her ASAP.   Patient is really constipated.     Please call [phone].     Ana Saxena RN  [facility]

## 2022-01-17 NOTE — TELEPHONE ENCOUNTER
Talked to patient and her sister.    Recommend that she continue Clearlax daily and consistently for now.    May use Dulcolax suppository every several days as needed for continued constipation.    Prescribed Linzess 72mcg once daily (may or may not be too expensive).

## 2022-01-31 ENCOUNTER — OFFICE VISIT (OUTPATIENT)
Dept: INTERNAL MEDICINE | Facility: CLINIC | Age: 87
End: 2022-01-31
Payer: COMMERCIAL

## 2022-01-31 VITALS
SYSTOLIC BLOOD PRESSURE: 118 MMHG | WEIGHT: 131 LBS | TEMPERATURE: 98.3 F | HEART RATE: 88 BPM | BODY MASS INDEX: 27.38 KG/M2 | DIASTOLIC BLOOD PRESSURE: 62 MMHG | OXYGEN SATURATION: 99 %

## 2022-01-31 DIAGNOSIS — K59.00 CONSTIPATION, UNSPECIFIED CONSTIPATION TYPE: Primary | ICD-10-CM

## 2022-01-31 DIAGNOSIS — F33.0 MAJOR DEPRESSIVE DISORDER, RECURRENT EPISODE, MILD (H): ICD-10-CM

## 2022-01-31 PROCEDURE — 99213 OFFICE O/P EST LOW 20 MIN: CPT | Performed by: INTERNAL MEDICINE

## 2022-01-31 RX ORDER — BISACODYL 10 MG
10 SUPPOSITORY, RECTAL RECTAL DAILY PRN
Qty: 30 SUPPOSITORY | Refills: 1 | Status: SHIPPED | OUTPATIENT
Start: 2022-01-31 | End: 2022-01-31

## 2022-01-31 RX ORDER — BISACODYL 10 MG
10 SUPPOSITORY, RECTAL RECTAL DAILY PRN
Qty: 30 SUPPOSITORY | Refills: 1 | Status: SHIPPED | OUTPATIENT
Start: 2022-01-31 | End: 2023-02-21

## 2022-01-31 NOTE — PROGRESS NOTES
Assessment & Plan     Constipation, unspecified constipation type  Recent work-up has been unrevealing. GI referral placed per their request. I wrote out an extensive bowel regimen on the AVS for them to try. Exam benign today thankfully.  - Adult Gastro Ref - Consult Only; Future  - bisacodyl (DULCOLAX) 10 MG suppository; Place 1 suppository (10 mg) rectally daily as needed for constipation    Major depressive disorder, recurrent episode, mild (H)  Known issue that I take into account for their medical decisions, no current exacerbations or new concerns. Managed by PCP with medications. Defer to her normal care team.    F/u with regular PCP at regular interval or sooner BRANDON Mayorga MD  [facility]    Lenny Chavez is a 86 year old who presents for an acute care visit to follow-up on constipation. This is the first time I have met Kathy, who normally sees Dr. Ospina and recently saw BERLIN Santa, for an acute care visit for this same issue. She has been taking Metamucil once a day and Miralax once a day. She stopped taking senna and Dulcolax. She thinks she may have started the Linzess prescribed to her a couple of weeks ago but she's not sure. No abdominal pain. Having BMs about once a day but feels they are hard to pass due to hard stool. No blood in stool or black stools.    Review of Systems   Constitutional, gi systems are negative, except as otherwise noted.      Objective    /62   Pulse 88   Temp 98.3  F (36.8  C) (Tympanic)   Wt 59.4 kg (131 lb)   SpO2 99%   BMI 27.38 kg/m    Body mass index is 27.38 kg/m .     Physical Exam   GENERAL: alert and in no distress.  EYES: conjunctivae/corneas clear. EOMs grossly intact  HENT: Facies symmetric.  RESP: No iWOB.  GI: NT, ND, without rebound tenderness or guarding  SKIN: No significant ulcers, lesions, or rashes on the visualized portions of the skin

## 2022-01-31 NOTE — PATIENT INSTRUCTIONS
- Continue Metamucil every evening  - Try 2 scoops of Miralax every morning. If that doesn't work, then repeat it around lunch time. If that doesn't work, repeat it around dinner time.  - Okay to try senna as well  - Referral to the gastroenterologist placed  - Okay to continue Linzess (this is a daily medication that was recently prescribed to you by Dr. Blake)    - If you are still constipated despite the above 'bowel regimen', then try a bisacodyl suppository. It is safe to take a suppository every day if you need to.

## 2022-03-15 DIAGNOSIS — F41.9 ANXIETY: ICD-10-CM

## 2022-03-15 NOTE — TELEPHONE ENCOUNTER
Routing refill request to provider for review/approval because:  Drug not on the [facility] refill protocol   Lizeth Nielsen RN

## 2022-03-16 RX ORDER — LORAZEPAM 0.5 MG/1
TABLET ORAL
Qty: 90 TABLET | Refills: 1 | Status: SHIPPED | OUTPATIENT
Start: 2022-03-16 | End: 2022-04-12

## 2022-03-24 ENCOUNTER — TELEPHONE (OUTPATIENT)
Dept: INTERNAL MEDICINE | Facility: CLINIC | Age: 87
End: 2022-03-24
Payer: COMMERCIAL

## 2022-03-24 NOTE — LETTER
[facility]  [address] [address]  [phone]      3/24/2022       Kathy Braswell  [address]  [facility] [address]        Dear Kathy,  Your healthcare team cares about your health. To provide you with the best care,   we have reviewed your chart and based on our findings, we see that you are due to:     Annual Wellness Follow up-Please contact number listed above to schedule or schedule via [employer]ais[employer].     If you have already completed these items, please contact the clinic via phone or   [employer] so your care team can review and update your records. Thank you for   choosing [facility] for your healthcare needs. For any questions,   concerns, or to schedule an appointment please contact the clinic.     Healthy Regards,    Your [facility] Care Team

## 2022-03-24 NOTE — TELEPHONE ENCOUNTER
Patient Quality Outreach    Patient is due for the following:   Depression  -  PHQ-9 Needed  Physical  - 10/09/19    NEXT STEPS:   Schedule a yearly physical    Type of outreach:    Sent letter.    Next Steps:  Reach out within 90 days via Letter.    Max number of attempts reached: Yes. Will try again in 90 days if patient still on fail list.    Questions for provider review:    None     Julianna Rodriguez [facility]  Chart routed to Care Team.

## 2022-04-11 ENCOUNTER — TELEPHONE (OUTPATIENT)
Dept: INTERNAL MEDICINE | Facility: CLINIC | Age: 87
End: 2022-04-11
Payer: COMMERCIAL

## 2022-04-11 DIAGNOSIS — F41.9 ANXIETY: ICD-10-CM

## 2022-04-11 RX ORDER — LORAZEPAM 0.5 MG/1
TABLET ORAL
Qty: 90 TABLET | Refills: 1 | Status: CANCELLED | OUTPATIENT
Start: 2022-04-11

## 2022-04-11 NOTE — TELEPHONE ENCOUNTER
Routing refill request to provider for review/approval because:  Drug not on the [facility] refill protocol   Sheyla Mike RN

## 2022-04-11 NOTE — TELEPHONE ENCOUNTER
Prior Authorization Retail Medication Request    Medication/Dose: LORazepam (ATIVAN) 0.5 MG tablet  ICD code (if different than what is on RX):  F41.9  Previously Tried and Failed:    Rationale:      Insurance Name:  [employer]  Insurance ID:  [id number]      Pharmacy Information (if different than what is on RX)  Name:  [facility]  Phone:  [phone]

## 2022-04-12 ENCOUNTER — NURSE TRIAGE (OUTPATIENT)
Dept: NURSING | Facility: CLINIC | Age: 87
End: 2022-04-12
Payer: COMMERCIAL

## 2022-04-12 DIAGNOSIS — F41.9 ANXIETY: ICD-10-CM

## 2022-04-12 RX ORDER — LORAZEPAM 0.5 MG/1
TABLET ORAL
Qty: 30 TABLET | Refills: 0 | Status: SHIPPED | OUTPATIENT
Start: 2022-04-12 | End: 2022-05-15

## 2022-04-12 NOTE — TELEPHONE ENCOUNTER
Would suggest routing this directly to Dr. Ospina as this appears to be his patient and he is in the clinic today.  I will forward message.

## 2022-04-12 NOTE — TELEPHONE ENCOUNTER
Central Prior Authorization Team   Phone: [phone]      PA Initiation    Medication: LORAZEPAM 0.5MG--INITIATED  Insurance Company: [employer] - Phone [phone] Fax [phone]  Pharmacy Filling the Rx: [id number] - [place], [place] - [address]  Filling Pharmacy Phone: [phone]  Filling Pharmacy Fax:    Start Date: 4/12/2022

## 2022-04-12 NOTE — TELEPHONE ENCOUNTER
Review [place]  and note from pharmacy shows that Rx was filled on 3/24/22 for #30 tabs so should not need refill prior to 4/23/22. The earliest that pharmacy/insurance will allow for refill would be 4/20/22. This is a controlled substance and not to be filled earlier than expected per controlled substance agreement pt signed with use of  Lorazepam.  I have authorized pharmacy to refill the Lorazepam starting 4/20/22 for #30 tabs. Would suggest that pt count tabs when she gets home after picking up future prescription to confirm there are #30 tabs in bottle.  May not refill medication prior. If has used up the medication early, then will have to rely on  Buspirone BID and Fluoxetine as also prescribed by management. Pt last seen by me in August 2021. Would also recommend follow-up with me in clinic in the next 4 weeks. Please schedule appt with me. May use same day appt slot as long as there is another same day appt slot open that day

## 2022-04-12 NOTE — TELEPHONE ENCOUNTER
Nurse Triage SBAR    Is this a 2nd Level Triage? YES, LICENSED PRACTITIONER REVIEW IS REQUIRED    Situation:   Patient calling because she would like a refill on her Lorazepam 0.5mg and is was told by the pharmacy that she is not due to have it filled. Pt states that she has only one pill left and needs more.    Background: Called [facility] pharmacy in [place] and spoke with [employer] who says the patient picked up her previous rx on 3/24. They can do an early fill, on 3/20, but nothing before.     Upon calling the patient back, she says she is only taking one pill per day, as prescribed. She also states that she doesn't drive any longer, and that her son picks up her prescriptions for her. He did pick them up on 3/24.  Is not concerned that her son would have taken any out of her pill bottle and doesn't have anyone coming into her home who she thinks would be taking them either.     Assessment:   Pt is missing 10 pills from her most recent Lorazepam rx refill. They are unaccounted for. She reports that she is taking the rx as prescribed, 1 pill per day.    Protocol Recommended Disposition:   Call Back By PCP Today    Recommendation:   Please advise if this patient can get an early refill, as she will be out of her medication tomorrow.     Routed to provider    Bing Ramos, RN, BSN  [facility]   Triage Nurse Advisor      Reason for Disposition    Caller has NON-URGENT medication question about med that PCP prescribed and triager unable to answer question    Protocols used: MEDICATION QUESTION CALL-A-OH

## 2022-04-13 NOTE — TELEPHONE ENCOUNTER
Central Prior Authorization Team   Phone: [phone]      Prior Authorization Approval    Authorization Effective Date: 3/13/2022  Authorization Expiration Date: 4/13/2023  Medication: LORAZEPAM 0.5MG--APPROVED  Approved Dose/Quantity:    Reference #:     Insurance Company: [employer] - Phone [phone] Fax [phone]  Expected CoPay:       CoPay Card Available:      Foundation Assistance Needed:    Which Pharmacy is filling the prescription (Not needed for infusion/clinic administered): [id number] - [place], [place] - [address]  Pharmacy Notified: Yes  Patient Notified: Yes PHARMACY WILL CONTACT WHEN FILLED

## 2022-05-13 DIAGNOSIS — F41.9 ANXIETY: ICD-10-CM

## 2022-05-15 RX ORDER — LORAZEPAM 0.5 MG/1
TABLET ORAL
Qty: 30 TABLET | Refills: 2 | Status: SHIPPED | OUTPATIENT
Start: 2022-05-15 | End: 2022-08-14

## 2022-08-12 DIAGNOSIS — F32.0 MILD MAJOR DEPRESSION (H): ICD-10-CM

## 2022-08-12 DIAGNOSIS — I10 ESSENTIAL HYPERTENSION, BENIGN: ICD-10-CM

## 2022-08-12 DIAGNOSIS — F41.9 ANXIETY: ICD-10-CM

## 2022-08-12 NOTE — LETTER
[facility]  [address] [id number] [phone]            Kathy Braswell  [address]  [facility] [address]            Dear Kathy,    While refilling your prescriptions, we noticed that you are due for an appointment with your provider.  We will refill your prescription for 60 days, but a follow-up appointment must be made before any additional refills can be approved.     Taking care of your health is important to us and we look forward to seeing you in the near future.  Please call us at [phone] or [phone] (or use [employer]) to schedule an appointment.     Please disregard this notice if you have already made an appointment.    Sincerely,        [facility]

## 2022-08-14 RX ORDER — BUSPIRONE HYDROCHLORIDE 15 MG/1
15 TABLET ORAL 2 TIMES DAILY
Qty: 180 TABLET | Refills: 0 | Status: SHIPPED | OUTPATIENT
Start: 2022-08-14 | End: 2022-11-23

## 2022-08-14 RX ORDER — IRBESARTAN 300 MG/1
300 TABLET ORAL DAILY
Qty: 90 TABLET | Refills: 0 | Status: SHIPPED | OUTPATIENT
Start: 2022-08-14 | End: 2022-12-04

## 2022-08-14 RX ORDER — AMLODIPINE BESYLATE 5 MG/1
5 TABLET ORAL DAILY
Qty: 90 TABLET | Refills: 0 | Status: SHIPPED | OUTPATIENT
Start: 2022-08-14 | End: 2022-12-09

## 2022-08-14 RX ORDER — LORAZEPAM 0.5 MG/1
TABLET ORAL
Qty: 30 TABLET | Refills: 2 | Status: SHIPPED | OUTPATIENT
Start: 2022-08-14 | End: 2022-11-10

## 2022-08-14 NOTE — TELEPHONE ENCOUNTER
Patient last saw me in August 2021.  Was instructed in April 2020 to schedule a follow-up appointment with me but not made.  Medication refilled for 30 days with 2 additional refills.   Will not refill this medication again without follow-up appointment with me in clinic.  Inform patient and assist in scheduling appointment.  Other meds I am prescribing also refilled for 90 days to cover patient until seen back in clinic

## 2022-11-09 ENCOUNTER — IMMUNIZATION (OUTPATIENT)
Dept: NURSING | Facility: CLINIC | Age: 87
End: 2022-11-09
Payer: COMMERCIAL

## 2022-11-09 DIAGNOSIS — F41.9 ANXIETY: ICD-10-CM

## 2022-11-09 PROCEDURE — 90662 IIV NO PRSV INCREASED AG IM: CPT

## 2022-11-09 PROCEDURE — 0124A COVID-19,PF,PFIZER BOOSTER BIVALENT: CPT

## 2022-11-09 PROCEDURE — 91312 COVID-19,PF,PFIZER BOOSTER BIVALENT: CPT

## 2022-11-09 PROCEDURE — G0008 ADMIN INFLUENZA VIRUS VAC: HCPCS | Mod: 59

## 2022-11-10 ENCOUNTER — NURSE TRIAGE (OUTPATIENT)
Dept: NURSING | Facility: CLINIC | Age: 87
End: 2022-11-10

## 2022-11-10 DIAGNOSIS — F41.9 ANXIETY: ICD-10-CM

## 2022-11-10 DIAGNOSIS — R11.2 NAUSEA AND VOMITING, UNSPECIFIED VOMITING TYPE: Primary | ICD-10-CM

## 2022-11-10 RX ORDER — LORAZEPAM 0.5 MG/1
TABLET ORAL
Qty: 30 TABLET | OUTPATIENT
Start: 2022-11-10

## 2022-11-10 RX ORDER — ONDANSETRON 4 MG/1
4 TABLET, ORALLY DISINTEGRATING ORAL EVERY 12 HOURS PRN
Qty: 30 TABLET | Refills: 0 | Status: SHIPPED | OUTPATIENT
Start: 2022-11-10 | End: 2023-02-21

## 2022-11-10 RX ORDER — LORAZEPAM 0.5 MG/1
0.5 TABLET ORAL DAILY PRN
Qty: 90 TABLET | Refills: 1 | Status: SHIPPED | OUTPATIENT
Start: 2022-11-10 | End: 2023-02-08

## 2022-11-10 NOTE — TELEPHONE ENCOUNTER
"\"I had a flu and covid shots yesterday at 1 pm and I'm suffering. I've been vomiting.\" Frequent stooling last night and today.   States she has a little headache, states that this is not her main concern.    Emesis: yellowish and white, approximately 4 times a day    Stool: patient unable to describe.    Patient was tearful and anxious, with no form of personal transportation. Has no one who could give her a ride. Required reassurance that she would be helped.    Care advice given for patient to be seen in ED now or PCP triage due to moderate vomiting and age. Patient continued to be very anxious because she has no form of transportation. Patient requested that writer contact EMS. EMS contacted and paramedic dispatched to assess patient.     Jamilah Ratliff RN on 11/10/2022 at 5:37 PM    Reason for Disposition    [1] MODERATE vomiting (e.g., 3 - 5 times/day) AND [2] age > 60 years    Additional Information    Negative: Shock suspected (e.g., cold/pale/clammy skin, too weak to stand, low BP, rapid pulse)    Negative: Difficult to awaken or acting confused (e.g., disoriented, slurred speech)    Negative: Sounds like a life-threatening emergency to the triager    Negative: Vomiting occurs only while coughing    Negative: [1] Pregnant < 20 Weeks AND [2] nausea/vomiting began in early pregnancy (i.e., 4-8 weeks pregnant)    Negative: Chest pain    Negative: Headache is main symptom    Negative: Vomiting (or Nausea) in a cancer patient who is currently (or recently) receiving chemotherapy or radiation therapy, or cancer patient who has metastatic or end-stage cancer and is receiving palliative care    Negative: [1] Vomiting AND [2] contains red blood or black (\"coffee ground\") material  (Exception: few red streaks in vomit that only happened once)    Negative: Severe pain in one eye    Negative: Recent head injury (within last 3 days)    Negative: Recent abdominal injury (within last 3 days)    Negative: [1] " Insulin-dependent diabetes (Type I) AND [2] glucose > 400 mg/dl (22 mmol/l)    Negative: [1] Vomiting AND [2] hernia is more painful or swollen than usual    Negative: [1] SEVERE vomiting (e.g., 6 or more times/day) AND [2] present > 8 hours (Exception: patient sounds well, is drinking liquids, does not sound dehydrated, and vomiting has lasted less than 24 hours)    Protocols used: VOMITING-A-AH

## 2022-11-11 DIAGNOSIS — F41.9 ANXIETY: ICD-10-CM

## 2022-11-11 DIAGNOSIS — F32.0 MILD MAJOR DEPRESSION (H): ICD-10-CM

## 2022-11-11 NOTE — TELEPHONE ENCOUNTER
"Spoke with patient and neighbor who is with her now.  EMS saw her and left without intervention.  Temp, blood pressure and heart rate \"normal\" her neighbor reports.  Specific numbers not available.  Patient has chronic history of anxiety and on lorazepam 0.5 mg daily.  Took an extra tablet after EMS left and feeling calmer now.  Was quite anxious earlier.  Has taken some Imodium and diarrhea has settled down.  Last emesis a couple hours ago prior to EMS.  Tolerating water okay.  Denies abdominal pain currently.  Member will get some Gatorade or Powerade for the patient.  Lorazepam refilled to  at pharmacy tomorrow when opens and prescription also done for some Zofran to use as needed.  If symptoms not improving over the next 24 hours or worsen before then, then patient to contact clinic and we will get her seen in [facility] if during the day vs ER if acutely worsening fir IVF, etc  "

## 2022-11-21 DIAGNOSIS — F41.9 ANXIETY: ICD-10-CM

## 2022-11-23 RX ORDER — BUSPIRONE HYDROCHLORIDE 15 MG/1
TABLET ORAL
Qty: 180 TABLET | Refills: 3 | Status: SHIPPED | OUTPATIENT
Start: 2022-11-23 | End: 2023-09-11

## 2022-11-23 NOTE — TELEPHONE ENCOUNTER
PHQ-9 score:    PHQ 8/30/2021   PHQ-9 Total Score 5   Q9: Thoughts of better off dead/self-harm past 2 weeks Not at all     Out of Date.    Has an appointment in 2 weeks with Dr. Ospina.    Please refill as appropriate.  Thank you,    May Francis RN  [facility]

## 2022-12-02 DIAGNOSIS — I10 ESSENTIAL HYPERTENSION, BENIGN: ICD-10-CM

## 2022-12-04 RX ORDER — IRBESARTAN 300 MG/1
TABLET ORAL
Qty: 90 TABLET | Refills: 3 | Status: SHIPPED | OUTPATIENT
Start: 2022-12-04 | End: 2023-09-11

## 2022-12-09 DIAGNOSIS — I10 ESSENTIAL HYPERTENSION, BENIGN: ICD-10-CM

## 2022-12-09 RX ORDER — AMLODIPINE BESYLATE 5 MG/1
TABLET ORAL
Qty: 90 TABLET | Refills: 0 | Status: SHIPPED | OUTPATIENT
Start: 2022-12-09 | End: 2023-02-06

## 2022-12-09 NOTE — TELEPHONE ENCOUNTER
Prescription approved per [facility] Refill Protocol.  Luís Alejandro RN  [facility] Triage Nurse

## 2023-02-06 DIAGNOSIS — I10 ESSENTIAL HYPERTENSION, BENIGN: ICD-10-CM

## 2023-02-06 DIAGNOSIS — F41.9 ANXIETY: ICD-10-CM

## 2023-02-08 RX ORDER — LORAZEPAM 0.5 MG/1
0.5 TABLET ORAL DAILY PRN
Qty: 90 TABLET | Refills: 1 | Status: SHIPPED | OUTPATIENT
Start: 2023-02-08 | End: 2023-05-17

## 2023-02-08 RX ORDER — AMLODIPINE BESYLATE 5 MG/1
5 TABLET ORAL DAILY
Qty: 90 TABLET | Refills: 3 | Status: SHIPPED | OUTPATIENT
Start: 2023-02-08 | End: 2023-09-11

## 2023-02-08 NOTE — TELEPHONE ENCOUNTER
[facility] reviewed.  Has appointment scheduled with me later this month  
Patient is out of both medications.   
no

## 2023-02-21 ENCOUNTER — OFFICE VISIT (OUTPATIENT)
Dept: INTERNAL MEDICINE | Facility: CLINIC | Age: 88
End: 2023-02-21
Payer: COMMERCIAL

## 2023-02-21 DIAGNOSIS — F32.0 MILD MAJOR DEPRESSION (H): ICD-10-CM

## 2023-02-21 DIAGNOSIS — R41.89 COGNITIVE IMPAIRMENT: ICD-10-CM

## 2023-02-21 DIAGNOSIS — I10 ESSENTIAL HYPERTENSION, BENIGN: ICD-10-CM

## 2023-02-21 DIAGNOSIS — F41.9 ANXIETY: ICD-10-CM

## 2023-02-21 DIAGNOSIS — Z00.00 MEDICARE ANNUAL WELLNESS VISIT, SUBSEQUENT: ICD-10-CM

## 2023-02-21 DIAGNOSIS — M85.88 OTHER SPECIFIED DISORDERS OF BONE DENSITY AND STRUCTURE, OTHER SITE: ICD-10-CM

## 2023-02-21 LAB
ALBUMIN SERPL BCG-MCNC: 4.7 G/DL (ref 3.5–5.2)
ALP SERPL-CCNC: 91 U/L (ref 35–104)
ALT SERPL W P-5'-P-CCNC: 10 U/L (ref 10–35)
ANION GAP SERPL CALCULATED.3IONS-SCNC: 15 MMOL/L (ref 7–15)
AST SERPL W P-5'-P-CCNC: 22 U/L (ref 10–35)
BILIRUB SERPL-MCNC: 0.4 MG/DL
BUN SERPL-MCNC: 10 MG/DL (ref 8–23)
CALCIUM SERPL-MCNC: 10.1 MG/DL (ref 8.8–10.2)
CHLORIDE SERPL-SCNC: 101 MMOL/L (ref 98–107)
CREAT SERPL-MCNC: 0.57 MG/DL (ref 0.51–0.95)
DEPRECATED HCO3 PLAS-SCNC: 24 MMOL/L (ref 22–29)
ERYTHROCYTE [DISTWIDTH] IN BLOOD BY AUTOMATED COUNT: 13.3 % (ref 10–15)
GFR SERPL CREATININE-BSD FRML MDRD: 89 ML/MIN/1.73M2
GLUCOSE SERPL-MCNC: 113 MG/DL (ref 70–99)
HCT VFR BLD AUTO: 46.1 % (ref 35–47)
HGB BLD-MCNC: 15.4 G/DL (ref 11.7–15.7)
MCH RBC QN AUTO: 30 PG (ref 26.5–33)
MCHC RBC AUTO-ENTMCNC: 33.4 G/DL (ref 31.5–36.5)
MCV RBC AUTO: 90 FL (ref 78–100)
PLATELET # BLD AUTO: 280 10E3/UL (ref 150–450)
POTASSIUM SERPL-SCNC: 4 MMOL/L (ref 3.4–5.3)
PROT SERPL-MCNC: 7.6 G/DL (ref 6.4–8.3)
RBC # BLD AUTO: 5.14 10E6/UL (ref 3.8–5.2)
SODIUM SERPL-SCNC: 140 MMOL/L (ref 136–145)
WBC # BLD AUTO: 7.8 10E3/UL (ref 4–11)

## 2023-02-21 PROCEDURE — 99214 OFFICE O/P EST MOD 30 MIN: CPT | Mod: 25 | Performed by: INTERNAL MEDICINE

## 2023-02-21 PROCEDURE — 36415 COLL VENOUS BLD VENIPUNCTURE: CPT | Performed by: INTERNAL MEDICINE

## 2023-02-21 PROCEDURE — 80053 COMPREHEN METABOLIC PANEL: CPT | Performed by: INTERNAL MEDICINE

## 2023-02-21 PROCEDURE — 85027 COMPLETE CBC AUTOMATED: CPT | Performed by: INTERNAL MEDICINE

## 2023-02-21 PROCEDURE — G0439 PPPS, SUBSEQ VISIT: HCPCS | Performed by: INTERNAL MEDICINE

## 2023-02-21 ASSESSMENT — ACTIVITIES OF DAILY LIVING (ADL)
CURRENT_FUNCTION: SHOPPING REQUIRES ASSISTANCE
CURRENT_FUNCTION: TRANSPORTATION REQUIRES ASSISTANCE
CURRENT_FUNCTION: LAUNDRY REQUIRES ASSISTANCE

## 2023-02-21 NOTE — PROGRESS NOTES
"SUBJECTIVE:   Kathy is a 87 year old who presents for Preventive Visit and regarding hypertension, chronic anxiety, osteopenia. Pt seen today with her sister     Patient has been advised of split billing requirements and indicates understanding: Yes  Are you in the first 12 months of your Medicare coverage?  No    Healthy Habits:    In general, how would you rate your overall health?  Very good    Frequency of exercise:  None    Duration of exercise:  Less than 15 minutes    Do you usually eat at least 4 servings of fruit and vegetables a day, include whole grains    & fiber and avoid regularly eating high fat or \"junk\" foods?  No    Taking medications regularly:  Yes    Barriers to taking medications:  None    Medication side effects:  None    Ability to successfully perform activities of daily living:  Transportation requires assistance, shopping requires assistance and laundry requires assistance    Home Safety:  No safety concerns identified    Hearing Impairment:  No hearing concerns    In the past 6 months, have you been bothered by leaking of urine?  No    In general, how would you rate your overall mental or emotional health?  Good      PHQ-2 Total Score:    Additional concerns today:  No      Have you ever done Advance Care Planning? (For example, a Health Directive, POLST, or a discussion with a medical provider or your loved ones about your wishes): No, advance care planning information given to patient to review.  Patient declined advance care planning discussion at this time.    Fall risk  Fallen 2 or more times in the past year?: No  Any fall with injury in the past year?: No    Cognitive Screening   1) Repeat 3 items (Leader, Season, Table)    2) Clock draw: ABNORMAL wrong clock hands  3) 3 item recall: Recalls NO objects   Results: 0 items recalled: PROBABLE COGNITIVE IMPAIRMENT, **INFORM PROVIDER**    Mini-CogTM Copyright MARISA Fernandes. Licensed by the author for use in [facility]; reprinted " with permission ([email or web address]@[email or web address]). All rights reserved.      Do you have sleep apnea, excessive snoring or daytime drowsiness?: no    Reviewed and updated as needed this visit by clinical staff                  Reviewed and updated as needed this visit by Provider                 Social History     Tobacco Use     Smoking status: Never     Smokeless tobacco: Never   Substance Use Topics     Alcohol use: Yes     Alcohol/week: 0.0 standard drinks     Comment: seldom         Alcohol Use 7/14/2017   Prescreen: >3 drinks/day or >7 drinks/week? The patient does not drink >3 drinks per day nor >7 drinks per week.               Current providers sharing in care for this patient include:   Patient Care Team:  Rio Ospina MD as PCP - General  Rio Ospina MD as Assigned PCP    The following health maintenance items are reviewed in Epic and correct as of today:  Health Maintenance   Topic Date Due     ANNUAL REVIEW OF HM ORDERS  Never done     ADVANCE CARE PLANNING  06/13/2016     MEDICARE ANNUAL WELLNESS VISIT  10/09/2019     FALL RISK ASSESSMENT  08/25/2021     PHQ-9  02/28/2022     DTAP/TDAP/TD IMMUNIZATION (4 - Td or Tdap) 10/05/2026     DEPRESSION ACTION PLAN  Completed     INFLUENZA VACCINE  Completed     Pneumococcal Vaccine: 65+ Years  Completed     ZOSTER IMMUNIZATION  Completed     COVID-19 Vaccine  Completed     IPV IMMUNIZATION  Aged Out     MENINGITIS IMMUNIZATION  Aged Out     Labs reviewed in EPIC      Mammogram Screening - Patient over age 75, has elected to discontinue screenings.  Pertinent mammograms are reviewed under the imaging tab.    Review of Systems  CONSTITUTIONAL: NEGATIVE for fever, chills. Stable weight 1 year  INTEGUMENTARY/SKIN: NEGATIVE for worrisome rashes, moles or lesions  EYES: NEGATIVE for vision changes or irritation. Has glasses  ENT/MOUTH: NEGATIVE for ear, mouth and throat problems  RESP: NEGATIVE for significant cough or SOB  BREAST: NEGATIVE for masses, tenderness or discharge  CV:  "NEGATIVE for chest pain, palpitations or peripheral edema  GI: NEGATIVE for nausea, abdominal pain, heartburn, or change in bowel habits  : NEGATIVE for frequency, dysuria, or hematuria  MUSCULOSKELETAL: NEGATIVE for significant arthralgias or myalgia  NEURO: NEGATIVE for weakness, dizziness or paresthesias. Stable memory loss per pt/sister. Gait stable  with walker. No falls.  Declines medication treatment for memory   ENDOCRINE: NEGATIVE for temperature intolerance, skin/hair changes  HEME: NEGATIVE for bleeding problems  PSYCHIATRIC:  POSITIVE for chronic anxiety    OBJECTIVE:   BP (!) 164/88   Pulse 94   Ht 1.473 m (4' 10\")   Wt 59.4 kg (131 lb)   SpO2 95%   BMI 27.38 kg/m   Estimated body mass index is 27.38 kg/m  as calculated from the following:    Height as of 7/22/20: 1.473 m (4' 10\").    Weight as of 1/31/22: 59.4 kg (131 lb).  Physical Exam  General appearance -  alert, no distress. Anxious affect  Skin - No rashes or lesions.  Head - normocephalic, atraumatic  Eyes - VALENTE, EOMI, fundi exam with nondilated pupils negative.  Ears - External ears normal. Canals clear. TM's normal.  Nose/Sinuses - Nares normal. Septum midline. Mucosa normal. No drainage or sinus tenderness.  Oropharynx - No erythema, no adenopathy, no exudates.  Neck - Supple without adenopathy or thyromegaly. No bruits.  Lungs - Clear to auscultation without wheezes/rhonchi.  Heart - Regular rate and rhythm without murmurs, clicks, or gallops.  Nodes - No supraclavicular, axillary, or inguinal adenopathy palpable.  Breasts - deferred  Abdomen - Abdomen soft, non-tender. BS normal. No masses or hepatosplenomegaly palpable. No bruits.  Extremities -No cyanosis, clubbing or edema.    Musculoskeletal - Spine ROM normal. Muscular strength intact.  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 No shuffling. " "Reflexes normal and symmetric. Sensation grossly WNL. No tremor  Genital/Rectal - deferred      ASSESSMENT / PLAN:   1. Medicare annual wellness visit, subsequent   Due for eye exam. Other HCN UTD for age.  Sister with pt 90% of the time per her report    2. Essential hypertension, benign   BP elevated today. Pt feels related to anxiety. Will continue current meds for now and pt to get BP recheck at [facility] pharmacy in 2-3 weeks. Not using internet so is to stop down there when leaving today to schedule BP recheck appt  - Comprehensive metabolic panel; Future  - CBC with platelets; Future  - Comprehensive metabolic panel  - CBC with platelets    3. Anxiety  Chronic but stable. Continue Flouxetine and Lorazepam  - FLUoxetine (PROZAC) 20 MG capsule; Take 1 capsule (20 mg) by mouth daily  Dispense: 90 capsule; Refill: 3    4. Cognitive impairment   Pt and daughter feel stable. Declines medication therapy. Previous CT brain with atrophy. Using microwave and otherwise not coooking. Knows to call 911 if emergency. Sister with pt 90% of the time per her report    5. Other specified disorders of bone density and structure, other site   On drug holiday from Fosamax after use 2016 - 2021. Due for repeat DEXA  - DX Hip/Pelvis/Spine; Future    6. Mild major depression (H)  Denies suicidal ideation. Feels mood \"OK\" per pt. Continue SSRI  - FLUoxetine (PROZAC) 20 MG capsule; Take 1 capsule (20 mg) by mouth daily  Dispense: 90 capsule; Refill: 3      Patient has been advised of split billing requirements and indicates understanding: Yes      COUNSELING:  Reviewed preventive health counseling, as reflected in patient instructions        She reports that she has never smoked. She has never used smokeless tobacco.      Appropriate preventive services were discussed with this patient, including applicable screening as appropriate for cardiovascular disease, diabetes, osteopenia/osteoporosis, and glaucoma.  As appropriate for " age/gender, discussed screening for colorectal cancer, prostate cancer, breast cancer, and cervical cancer. Checklist reviewing preventive services available has been given to the patient.    Reviewed patients plan of care and provided an AVS. The Basic Care Plan (routine screening as documented in Health Maintenance) for Kathy meets the Care Plan requirement. This Care Plan has been established and reviewed with the Patient and sister.    PLAN:  Continue current medications  Prescriptions refilled.    Labs today as ordered  Follow up in 6 months re: menatl health/anxiety, memory, etc. Earlier as needed  Get a blood pressure recheck   in  2 weeks  at the  [facility] pharmacy.  Stop at the pharmacy before getting labs done today and ask them to schedule a blood pressure check appointment for you   Bone density (DEXA)     This will be done at the [facility]. Call [phone] or use [email or web address] to schedule.   Pt was informed regarding extra E&M billing for management of new or established medical issues not related to today's wellness/screening visit    Rio Ospina MD  [facility]    Identified Health Risks:

## 2023-02-26 VITALS
WEIGHT: 131 LBS | DIASTOLIC BLOOD PRESSURE: 88 MMHG | HEIGHT: 58 IN | SYSTOLIC BLOOD PRESSURE: 164 MMHG | OXYGEN SATURATION: 95 % | HEART RATE: 94 BPM | BODY MASS INDEX: 27.5 KG/M2

## 2023-02-26 NOTE — PATIENT INSTRUCTIONS
Continue current medications  Prescriptions refilled.    Labs today as ordered  Follow up in 6 months re: menatl health/anxiety, memory, etc. Earlier as needed  Get a blood pressure recheck   in  2 weeks  at the  [facility] pharmacy.  Stop at the pharmacy before getting labs done today and ask them to schedule a blood pressure check appointment for you   Bone density (DEXA)     This will be done at the [facility]. Call [phone] or use [employer] to schedule.   Pt was informed regarding extra E&M billing for management of new or established medical issues not related to today's wellness/screening visit

## 2023-05-11 ENCOUNTER — TELEPHONE (OUTPATIENT)
Dept: INTERNAL MEDICINE | Facility: CLINIC | Age: 88
End: 2023-05-11

## 2023-05-11 NOTE — TELEPHONE ENCOUNTER
Prior Authorization Retail Medication Request    Medication/Dose: LORazepam (ATIVAN) 0.5 MG tablet  ICD code (if different than what is on RX):  F41.9  Previously Tried and Failed:    Rationale:      Insurance Name:  [employer]  Insurance ID:  [id number]      Pharmacy Information (if different than what is on RX)  Name:  [facility]   Phone:  [phone]

## 2023-05-16 DIAGNOSIS — F41.9 ANXIETY: ICD-10-CM

## 2023-05-17 RX ORDER — LORAZEPAM 0.5 MG/1
0.5 TABLET ORAL DAILY PRN
Qty: 90 TABLET | Refills: 0 | Status: SHIPPED | OUTPATIENT
Start: 2023-05-17 | End: 2023-08-17

## 2023-05-17 NOTE — TELEPHONE ENCOUNTER
Central Prior Authorization Team - Phone: [phone]     PA Initiation    Medication: LORAZEPAM 0.5 MG PO TABS  Insurance Company: [employer] - Phone [phone] Fax [phone]  Pharmacy Filling the Rx: [facility] #[id number] - [place], [place] - [address]  Filling Pharmacy Phone: [phone]  Filling Pharmacy Fax:    Start Date: 5/17/2023

## 2023-05-17 NOTE — TELEPHONE ENCOUNTER
[place]  reviewed.  OK for RF. Please call pt and assist in scheduling a follow-up appointment with me in 3 months regarding anxiety and hypertension.  Please also wish patient an upcoming happy birthday for me

## 2023-05-18 NOTE — TELEPHONE ENCOUNTER
Central Prior Authorization Team - Phone: [phone]     Prior Authorization Approval    Medication: LORAZEPAM 0.5 MG PO TABS  Authorization Effective Date: 4/17/2023  Authorization Expiration Date: 5/16/2024  Approved Dose/Quantity: 90  Reference #:     Insurance Company: [employer] - Phone [phone] Fax [phone]  Expected CoPay:       CoPay Card Available:      Financial Assistance Needed:   Which Pharmacy is filling the prescription: [facility] #[id number] - [place], [place] - [address]  Pharmacy Notified: Yes  Patient Notified: Yes

## 2023-05-19 NOTE — TELEPHONE ENCOUNTER
Called and informed pt about refills and scheduling a f/u appt in the next 3 months. Pt states she will check her schedule and call back to make the appt. Also wished pt a happy early birthday!     Vamsi Blount, VF

## 2023-08-13 DIAGNOSIS — F41.9 ANXIETY: ICD-10-CM

## 2023-08-16 NOTE — TELEPHONE ENCOUNTER
See RF encounter 5/6/23. Was instructed to schedule follow-up appt at that time within 3 mos but pt never did. On controlled substances with use of Lorazepam longterm. Call pt and get follow-up appt with me  in clinic in the next 1 month re: anxiety and HTN. May use same day/next day/ [facility] appt slot for appt. After appt scheduled, then route RF request back to me to address

## 2023-08-17 DIAGNOSIS — F41.9 ANXIETY: ICD-10-CM

## 2023-08-17 RX ORDER — LORAZEPAM 0.5 MG/1
0.5 TABLET ORAL DAILY PRN
Qty: 30 TABLET | Refills: 0 | Status: SHIPPED | OUTPATIENT
Start: 2023-08-17 | End: 2023-09-11

## 2023-08-17 RX ORDER — LORAZEPAM 0.5 MG/1
0.5 TABLET ORAL DAILY PRN
Qty: 90 TABLET | OUTPATIENT
Start: 2023-08-17

## 2023-08-17 NOTE — TELEPHONE ENCOUNTER
Due for appt. Staff spoke with pt and now  scheduled to  see me 9/11/23. RF done for 30 days. [place]  reviewed

## 2023-09-11 ENCOUNTER — OFFICE VISIT (OUTPATIENT)
Dept: INTERNAL MEDICINE | Facility: CLINIC | Age: 88
End: 2023-09-11
Payer: COMMERCIAL

## 2023-09-11 VITALS
SYSTOLIC BLOOD PRESSURE: 138 MMHG | BODY MASS INDEX: 27.71 KG/M2 | TEMPERATURE: 98.2 F | HEART RATE: 87 BPM | DIASTOLIC BLOOD PRESSURE: 64 MMHG | HEIGHT: 58 IN | OXYGEN SATURATION: 99 % | WEIGHT: 132 LBS

## 2023-09-11 DIAGNOSIS — R26.9 ABNORMAL GAIT: ICD-10-CM

## 2023-09-11 DIAGNOSIS — I10 ESSENTIAL HYPERTENSION, BENIGN: ICD-10-CM

## 2023-09-11 DIAGNOSIS — F32.0 MILD MAJOR DEPRESSION (H): ICD-10-CM

## 2023-09-11 DIAGNOSIS — F41.9 ANXIETY: ICD-10-CM

## 2023-09-11 PROCEDURE — 99214 OFFICE O/P EST MOD 30 MIN: CPT | Performed by: INTERNAL MEDICINE

## 2023-09-11 RX ORDER — BUSPIRONE HYDROCHLORIDE 15 MG/1
15 TABLET ORAL 2 TIMES DAILY
Qty: 180 TABLET | Refills: 3 | Status: SHIPPED | OUTPATIENT
Start: 2023-09-11 | End: 2024-03-11

## 2023-09-11 RX ORDER — IRBESARTAN 300 MG/1
300 TABLET ORAL DAILY
Qty: 90 TABLET | Refills: 3 | Status: SHIPPED | OUTPATIENT
Start: 2023-09-11 | End: 2024-03-11

## 2023-09-11 RX ORDER — AMLODIPINE BESYLATE 5 MG/1
5 TABLET ORAL DAILY
Qty: 90 TABLET | Refills: 3 | Status: SHIPPED | OUTPATIENT
Start: 2023-09-11 | End: 2024-03-11

## 2023-09-11 RX ORDER — LORAZEPAM 0.5 MG/1
0.5 TABLET ORAL DAILY PRN
Qty: 30 TABLET | Refills: 5 | Status: SHIPPED | OUTPATIENT
Start: 2023-09-11 | End: 2023-09-11

## 2023-09-11 RX ORDER — LORAZEPAM 0.5 MG/1
0.5 TABLET ORAL DAILY PRN
Qty: 30 TABLET | Refills: 5 | Status: SHIPPED | OUTPATIENT
Start: 2023-09-11 | End: 2024-04-04

## 2023-09-11 ASSESSMENT — ANXIETY QUESTIONNAIRES
7. FEELING AFRAID AS IF SOMETHING AWFUL MIGHT HAPPEN: NOT AT ALL
GAD7 TOTAL SCORE: 2
2. NOT BEING ABLE TO STOP OR CONTROL WORRYING: SEVERAL DAYS
6. BECOMING EASILY ANNOYED OR IRRITABLE: NOT AT ALL
3. WORRYING TOO MUCH ABOUT DIFFERENT THINGS: SEVERAL DAYS
IF YOU CHECKED OFF ANY PROBLEMS ON THIS QUESTIONNAIRE, HOW DIFFICULT HAVE THESE PROBLEMS MADE IT FOR YOU TO DO YOUR WORK, TAKE CARE OF THINGS AT HOME, OR GET ALONG WITH OTHER PEOPLE: NOT DIFFICULT AT ALL
1. FEELING NERVOUS, ANXIOUS, OR ON EDGE: NOT AT ALL
GAD7 TOTAL SCORE: 2
5. BEING SO RESTLESS THAT IT IS HARD TO SIT STILL: NOT AT ALL

## 2023-09-11 ASSESSMENT — PATIENT HEALTH QUESTIONNAIRE - PHQ9
SUM OF ALL RESPONSES TO PHQ QUESTIONS 1-9: 0
5. POOR APPETITE OR OVEREATING: NOT AT ALL

## 2023-09-11 NOTE — PROGRESS NOTES
ASSESSMENT:    1. Essential hypertension, benign  Controlled.  Continue medication  - amLODIPine (NORVASC) 5 MG tablet; Take 1 tablet (5 mg) by mouth daily  Dispense: 90 tablet; Refill: 3  - irbesartan (AVAPRO) 300 MG tablet; Take 1 tablet (300 mg) by mouth daily  Dispense: 90 tablet; Refill: 3    2. Anxiety  Controlled.  Continue current medication  - busPIRone (BUSPAR) 15 MG tablet; Take 1 tablet (15 mg) by mouth 2 times daily  Dispense: 180 tablet; Refill: 3  - FLUoxetine (PROZAC) 20 MG capsule; Take 1 capsule (20 mg) by mouth daily  Dispense: 90 capsule; Refill: 3  - LORazepam (ATIVAN) 0.5 MG tablet; Take 1 tablet (0.5 mg) by mouth daily as needed for anxiety  Dispense: 30 tablet; Refill: 5    3. Mild major depression (H)  Controlled.  Continue current medication  - FLUoxetine (PROZAC) 20 MG capsule; Take 1 capsule (20 mg) by mouth daily  Dispense: 90 capsule; Refill: 3    4. Abnormal gait  No falls.  Requires use of walker for stability with gait.  Declines physical therapy.  Continue use of walker  - Walker Order for DME - ONLY FOR DME      PLAN:  Continue current medications  Prescriptions refilled.  May fill Lorazepam  9/15/23.  Bring  paperwork prescription to pharmacy  Follow up in 6 months. Earlier as needed  Because non-acute appointments to see me in clinic are currently booking out about 3 months at the clinic (for multiple reasons), please use [employer] or call the appointment line 4-5 months in advance of this future appointment with me.   I would recommend a covid booster vaccination in early October  Flu vaccine 2 weeks late  in later October. You may have it done at any pharmacy or in clinic. If in clinic, then call  [phone].    Prescription for 4 wheel walker with brakes and seat. Bring to medical supply [facility]        (Chart documentation was completed, in part, with [employer] voice-recognition software. Even though reviewed, some grammatical, spelling, and word errors may remain.)    Rio ZHONG  "MD Anai  Internal Medicine Department  [facility] BLANKA Chavez is a 88 year old, presenting for the following health issues:  Anxiety and Hypertension      HPI     Hypertension Follow-up    Do you check your blood pressure regularly outside of the clinic? Yes   Are you following a low salt diet? Yes  Are your blood pressures ever more than 140 on the top number (systolic) OR more   than 90 on the bottom number (diastolic), for example 140/90? Yes          12/18/2020    11:16 AM 8/30/2021     9:02 AM 9/11/2023     1:33 PM   PHQ   PHQ-9 Total Score 10 5 0   Q9: Thoughts of better off dead/self-harm past 2 weeks Not at all Not at all Not at all         12/20/2020    12:40 PM 8/30/2021     9:03 AM 9/11/2023     1:34 PM   JUANPABLO-7 SCORE   Total Score 4 3 2       Most recent lab results reviewed with pt.       Mood stable with meds overall as above. Brother passed away last week that is causing some increased stress however. Using Lorazepam every night along with Prozac and Buspar for anxiety and to also help sleep. No AM hangover feeling  Stable weight  Denies chest pain, shortness of breath, abdominal pain, headache, vision changes or side effects with medications.   Using an older walker and requests new one for mild chronic unsteady gait. Slower but ambulating well with walker. No recent falls       Additional ROS:   Constitutional, HEENT, Cardiovascular, Pulmonary, GI and , Neuro, MSK and Psych review of systems/symptoms are otherwise negative or unchanged from previous, except as noted above.      OBJECTIVE:  /64   Pulse 87   Temp 98.2  F (36.8  C) (Oral)   Ht 1.473 m (4' 10\")   Wt 59.9 kg (132 lb)   SpO2 99%   BMI 27.59 kg/m     Estimated body mass index is 27.59 kg/m  as calculated from the following:    Height as of this encounter: 1.473 m (4' 10\").    Weight as of this encounter: 59.9 kg (132 lb).     Neck: no adenopathy. Thyroid normal to palpation. No " bruits  Pulm: Lungs clear to auscultation   CV: Regular rates and rhythm  GI: Soft, nontender, Normal active bowel sounds, No hepatosplenomegaly or masses palpable  Ext: Peripheral pulses intact. No edema.  Neuro: Normal strength and tone, sensory exam grossly normal. Slower but stable gait with walker. NO cogwheel rigidity or tremor   Gen: Mild anxious affect

## 2023-09-11 NOTE — PATIENT INSTRUCTIONS
Continue current medications  Prescriptions refilled.  May fill Lorazepam  9/15/23.  Bring  paperwork prescription to pharmacy  Follow up in 6 months. Earlier as needed  Because non-acute appointments to see me in clinic are currently booking out about 3 months at the clinic (for multiple reasons), please use [employer] or call the appointment line 4-5 months in advance of this future appointment with me.   I would recommend a covid booster vaccination in early October  Flu vaccine 2 weeks late  in later October. You may have it done at any pharmacy or in clinic. If in clinic, then call  [phone].    Prescription for 4 wheel walker with brakes and seat. Bring to medical supply emilio  
DISCHARGE

## 2023-10-23 ENCOUNTER — IMMUNIZATION (OUTPATIENT)
Dept: INTERNAL MEDICINE | Facility: CLINIC | Age: 88
End: 2023-10-23
Payer: COMMERCIAL

## 2023-10-23 DIAGNOSIS — Z23 NEED FOR PROPHYLACTIC VACCINATION AND INOCULATION AGAINST INFLUENZA: Primary | ICD-10-CM

## 2023-10-23 PROCEDURE — 90662 IIV NO PRSV INCREASED AG IM: CPT

## 2023-10-23 PROCEDURE — 99207 PR NO CHARGE NURSE ONLY: CPT

## 2023-10-23 PROCEDURE — G0008 ADMIN INFLUENZA VIRUS VAC: HCPCS

## 2023-11-13 ENCOUNTER — IMMUNIZATION (OUTPATIENT)
Dept: NURSING | Facility: CLINIC | Age: 88
End: 2023-11-13
Payer: COMMERCIAL

## 2023-11-13 PROCEDURE — 91320 SARSCV2 VAC 30MCG TRS-SUC IM: CPT

## 2023-11-13 PROCEDURE — 90480 ADMN SARSCOV2 VAC 1/ONLY CMP: CPT

## 2024-02-15 DIAGNOSIS — I10 ESSENTIAL HYPERTENSION, BENIGN: ICD-10-CM

## 2024-02-15 RX ORDER — AMLODIPINE BESYLATE 5 MG/1
5 TABLET ORAL DAILY
Qty: 90 TABLET | Refills: 3 | OUTPATIENT
Start: 2024-02-15

## 2024-03-11 ENCOUNTER — OFFICE VISIT (OUTPATIENT)
Dept: INTERNAL MEDICINE | Facility: CLINIC | Age: 89
End: 2024-03-11
Payer: COMMERCIAL

## 2024-03-11 VITALS
OXYGEN SATURATION: 95 % | DIASTOLIC BLOOD PRESSURE: 70 MMHG | HEART RATE: 91 BPM | TEMPERATURE: 98.4 F | SYSTOLIC BLOOD PRESSURE: 142 MMHG | HEIGHT: 58 IN | BODY MASS INDEX: 27.98 KG/M2 | WEIGHT: 133.3 LBS | RESPIRATION RATE: 16 BRPM

## 2024-03-11 DIAGNOSIS — F41.9 ANXIETY: ICD-10-CM

## 2024-03-11 DIAGNOSIS — Z00.00 MEDICARE ANNUAL WELLNESS VISIT, SUBSEQUENT: Primary | ICD-10-CM

## 2024-03-11 DIAGNOSIS — I10 ESSENTIAL HYPERTENSION, BENIGN: ICD-10-CM

## 2024-03-11 DIAGNOSIS — M81.0 OSTEOPOROSIS WITHOUT CURRENT PATHOLOGICAL FRACTURE, UNSPECIFIED OSTEOPOROSIS TYPE: ICD-10-CM

## 2024-03-11 DIAGNOSIS — F32.0 MILD MAJOR DEPRESSION (H): ICD-10-CM

## 2024-03-11 LAB
ALBUMIN SERPL BCG-MCNC: 4.5 G/DL (ref 3.5–5.2)
ALP SERPL-CCNC: 67 U/L (ref 40–150)
ALT SERPL W P-5'-P-CCNC: 10 U/L (ref 0–50)
ANION GAP SERPL CALCULATED.3IONS-SCNC: 11 MMOL/L (ref 7–15)
AST SERPL W P-5'-P-CCNC: 18 U/L (ref 0–45)
BILIRUB SERPL-MCNC: 0.5 MG/DL
BUN SERPL-MCNC: 12.7 MG/DL (ref 8–23)
CALCIUM SERPL-MCNC: 9.4 MG/DL (ref 8.8–10.2)
CHLORIDE SERPL-SCNC: 103 MMOL/L (ref 98–107)
CREAT SERPL-MCNC: 0.59 MG/DL (ref 0.51–0.95)
DEPRECATED HCO3 PLAS-SCNC: 25 MMOL/L (ref 22–29)
EGFRCR SERPLBLD CKD-EPI 2021: 87 ML/MIN/1.73M2
GLUCOSE SERPL-MCNC: 102 MG/DL (ref 70–99)
POTASSIUM SERPL-SCNC: 4 MMOL/L (ref 3.4–5.3)
PROT SERPL-MCNC: 7.1 G/DL (ref 6.4–8.3)
SODIUM SERPL-SCNC: 139 MMOL/L (ref 135–145)

## 2024-03-11 PROCEDURE — G0439 PPPS, SUBSEQ VISIT: HCPCS | Performed by: INTERNAL MEDICINE

## 2024-03-11 PROCEDURE — 99214 OFFICE O/P EST MOD 30 MIN: CPT | Mod: 25 | Performed by: INTERNAL MEDICINE

## 2024-03-11 PROCEDURE — 80053 COMPREHEN METABOLIC PANEL: CPT | Performed by: INTERNAL MEDICINE

## 2024-03-11 PROCEDURE — 36415 COLL VENOUS BLD VENIPUNCTURE: CPT | Performed by: INTERNAL MEDICINE

## 2024-03-11 RX ORDER — AMLODIPINE BESYLATE 5 MG/1
5 TABLET ORAL DAILY
Qty: 90 TABLET | Refills: 3 | Status: SHIPPED | OUTPATIENT
Start: 2024-03-11

## 2024-03-11 RX ORDER — IRBESARTAN 300 MG/1
300 TABLET ORAL DAILY
Qty: 90 TABLET | Refills: 3 | Status: SHIPPED | OUTPATIENT
Start: 2024-03-11

## 2024-03-11 RX ORDER — BUSPIRONE HYDROCHLORIDE 15 MG/1
15 TABLET ORAL 2 TIMES DAILY
Qty: 180 TABLET | Refills: 3 | Status: SHIPPED | OUTPATIENT
Start: 2024-03-11

## 2024-03-11 ASSESSMENT — ANXIETY QUESTIONNAIRES
7. FEELING AFRAID AS IF SOMETHING AWFUL MIGHT HAPPEN: NOT AT ALL
GAD7 TOTAL SCORE: 2
1. FEELING NERVOUS, ANXIOUS, OR ON EDGE: SEVERAL DAYS
3. WORRYING TOO MUCH ABOUT DIFFERENT THINGS: SEVERAL DAYS
5. BEING SO RESTLESS THAT IT IS HARD TO SIT STILL: NOT AT ALL
2. NOT BEING ABLE TO STOP OR CONTROL WORRYING: NOT AT ALL
GAD7 TOTAL SCORE: 2
6. BECOMING EASILY ANNOYED OR IRRITABLE: NOT AT ALL
IF YOU CHECKED OFF ANY PROBLEMS ON THIS QUESTIONNAIRE, HOW DIFFICULT HAVE THESE PROBLEMS MADE IT FOR YOU TO DO YOUR WORK, TAKE CARE OF THINGS AT HOME, OR GET ALONG WITH OTHER PEOPLE: NOT DIFFICULT AT ALL

## 2024-03-11 ASSESSMENT — PATIENT HEALTH QUESTIONNAIRE - PHQ9
SUM OF ALL RESPONSES TO PHQ QUESTIONS 1-9: 2
5. POOR APPETITE OR OVEREATING: NOT AT ALL

## 2024-03-11 ASSESSMENT — ACTIVITIES OF DAILY LIVING (ADL): CURRENT_FUNCTION: NEEDS ASSISTANCE

## 2024-03-11 NOTE — PATIENT INSTRUCTIONS
Continue current medications  Prescriptions refilled on file.    Labs today as ordered   Would recommend RSV vaccination. Get at a pharmacy  Continue use of walker with ambulation   See me  annnually  or earlier as needed  Bone density (DEXA). This [facility] be done at the [facility]. Call [phone]  to schedule.   Pt was informed regarding extra E&M billing for management of new or established medical issues not related to today's wellness/screening visit

## 2024-03-11 NOTE — PROGRESS NOTES
ASSESSMENT:    1. Medicare annual wellness visit, subsequent  Screening labs ordered.  RSV vaccination recommended.  Patient given shopping done by her son.  Sister comes by most days of the week to assist with other home activities.  Patient no longer driving.  Patient feels safe in her current living environment  - Comprehensive metabolic panel; Future  - Comprehensive metabolic panel    2. Osteoporosis without current pathological fracture, unspecified osteoporosis type  Previous alendronate therapy but patient stopped this 2021 after taking again for 3 years when failed prior drug holiday.  Needs bone density follow-up  - DX Hip/Pelvis/Spine; Future    3. Essential hypertension, benign  Controlled overall for age.  Continue current medication.  Labs as ordered  - amLODIPine (NORVASC) 5 MG tablet; Take 1 tablet (5 mg) by mouth daily  Dispense: 90 tablet; Refill: 3  - irbesartan (AVAPRO) 300 MG tablet; Take 1 tablet (300 mg) by mouth daily  Dispense: 90 tablet; Refill: 3  - Comprehensive metabolic panel; Future  - Comprehensive metabolic panel    4. Anxiety  Controlled. GAD7  = 2.    - busPIRone (BUSPAR) 15 MG tablet; Take 1 tablet (15 mg) by mouth 2 times daily  Dispense: 180 tablet; Refill: 3  - FLUoxetine (PROZAC) 20 MG capsule; Take 1 capsule (20 mg) by mouth daily  Dispense: 90 capsule; Refill: 3    5. Mild major depression (H24)  Controlled.  PHQ-9 = 2.  Continue current medication  - FLUoxetine (PROZAC) 20 MG capsule; Take 1 capsule (20 mg) by mouth daily  Dispense: 90 capsule; Refill: 3      PLAN:  Continue current medications  Prescriptions refilled on file.    Labs today as ordered   Would recommend RSV vaccination. Get at a pharmacy  Continue use of walker with ambulation   See me  annnually  or earlier as needed  Bone density (DEXA). [facility] be [facility] at the [facility]. Call [phone]  to schedule.   Pt was informed regarding extra E&M billing for management of new or established  medical issues not related to today's wellness/screening visit           Lenny Chavez is a 88 year old, presenting for the following health issues:  Hypertension and Wellness Visit    HPI       Hypertension Follow-up    Do you check your blood pressure regularly outside of the clinic? Yes   Are you following a low salt diet? No  Are your blood pressures ever more than 140 on the top number (systolic) OR more   than 90 on the bottom number (diastolic), for example 140/90? No    Annual Wellness Visit     Patient has been advised of split billing requirements and indicates understanding: Yes        Health Care Directive  Patient does not have a Health Care Directive or Living Will: Discussed advance care planning with patient; information given to patient to review.  In general, how would you rate your overall physical health? good  Do you have a special diet?  Regular (no restrictions)         3/11/2024   Exercise, Social Connection, Stress   Days per week of moderate/strenous exercise 0 days   Average minutes spent exercising at this level 0 min   Frequency of gathering with friends or relatives More than 3   Feel stress (tense, anxious, or unable to sleep) Only a littl     Do you see a dentist two times every year?  (!) NO  The patient was instructed to see the dentist every 6 months.   Have you been more tired than usual lately?  No  If you drink alcohol do you typically have >3 drinks per day or >7 drinks per week? No  Do you have a current opioid prescription? No  Do you use any other controlled substances or medications that are not prescribed by a provider? None    Social History     Tobacco Use    Smoking status: Never    Smokeless tobacco: Never   Vaping Use    Vaping Use: Never used   Substance Use Topics    Alcohol use: Yes     Alcohol/week: 0.0 standard drinks of alcohol     Comment: seldom    Drug use: No        Needs assistance for the following daily activities: (!) TRANSPORTATION, (!) SHOPPING,  and (!) LAUNDRY  Which of the following safety concerns are present in your home?  none identified   Do you (or your family members) have any concerns about your safety while driving?  No  Do you have any of the following hearing concerns?: No hearing concerns  In the past 6 months, have you been bothered by leaking of urine? (!) YES   Information on urinary incontinence and treatment options given to patient.            3/11/2024   Fall Risk   Fallen 2 or more times in the past year? No   Trouble with walking or balance? No          Today's PHQ-9 Score:       9/11/2023     1:33 PM   PHQ-9 SCORE   PHQ-9 Total Score 0          Mammogram Screening - Mammography discussed and declined      History of abnormal Pap smear: NO - age 65 - see link Cervical Cytology Screening Guidelines        6/13/2011    11:37 AM 5/29/2008    12:00 AM 4/23/2007    12:00 AM   PAP / HPV   PAP (Historical) NIL  NIL  NIL               Current providers sharing in care for this patient include:  Patient Care Team:  Rio Ospina MD as PCP - General  Rio Ospina MD as Assigned PCP    The following health maintenance items are reviewed in Epic and correct as of today:  Health Maintenance   Topic Date Due    ANNUAL REVIEW OF HM ORDERS  Never done    RSV VACCINE (Pregnancy & 60+) (1 - 1-dose 60+ series) Never done    MEDICARE ANNUAL WELLNESS VISIT  02/21/2024    PHQ-9  03/11/2024    FALL RISK ASSESSMENT  03/11/2025    DTAP/TDAP/TD IMMUNIZATION (2 - Td or Tdap) 10/05/2026    ADVANCE CARE PLANNING  02/26/2028    DEPRESSION ACTION PLAN  Completed    INFLUENZA VACCINE  Completed    Pneumococcal Vaccine: 65+ Years  Completed    ZOSTER IMMUNIZATION  Completed    COVID-19 Vaccine  Completed    IPV IMMUNIZATION  Aged Out    HPV IMMUNIZATION  Aged Out    MENINGITIS IMMUNIZATION  Aged Out    RSV MONOCLONAL ANTIBODY  Aged Out       Appropriate preventive services were discussed with this patient, including applicable screening as appropriate for fall  "prevention, nutrition, physical activity, Tobacco-use cessation, weight loss and cognition.  Checklist reviewing preventive services available has been given to the patient.           3/11/2024   Mini Cog   Clock Draw Score 0 Abnormal   3 Item Recall 0 objects recalled   Mini Cog Total Score 0                  Review of Systems  CONSTITUTIONAL: NEGATIVE for fever, chills. Weight up 2 pounds  INTEGUMENTARY/SKIN: NEGATIVE for worrisome rashes, moles or lesions  EYES: NEGATIVE for vision changes or irritation. Has glasses. Due for eye exam   ENT/MOUTH: NEGATIVE for ear, mouth and throat problems  RESP: NEGATIVE for significant cough or SOB  BREAST: NEGATIVE for masses, tenderness or discharge  CV: NEGATIVE for chest pain, palpitations or peripheral edema  GI: NEGATIVE for nausea, abdominal pain, heartburn, or change in bowel habits  : NEGATIVE for frequency, dysuria, or hematuria. Rare incontinence or urine. Occ use of pad. Has 3 cups coffee and not bothersome now  MUSCULOSKELETAL: NEGATIVE for significant arthralgias or myalgia that limits overall activity. Using a walker  NEURO: NEGATIVE for weakness, dizziness or paresthesias. No recent falls  ENDOCRINE: NEGATIVE for temperature intolerance.   HEME: NEGATIVE for bleeding problems  PSYCHIATRIC: NEGATIVE for changes in mood or affect      Objective    BP (!) 142/70   Pulse 91   Temp 98.4  F (36.9  C) (Temporal)   Resp 16   Ht 1.473 m (4' 10\")   Wt 60.5 kg (133 lb 4.8 oz)   SpO2 95%   BMI 27.86 kg/m    Body mass index is 27.86 kg/m .  Physical Exam   General appearance -  alert, no distress.   Calm affect today  Skin - No rashes or lesions.  Long toenails bilaterally.  Following verbal consent, toenails were trimmed using a standard clipper without complication  Head - normocephalic, atraumatic  Eyes - VALENTE, EOMI, fundi exam with nondilated pupils negative.  Ears - External ears normal. Canal clear right side and obstructing cerumen left.  Following verbal " consent, most of the cerumen was removed from the left canal by MD without complication using ring tipped probe.  TM's normal.  Nose/Sinuses - Nares normal. Septum midline. Mucosa normal. No drainage or sinus tenderness.  Oropharynx - No erythema, no adenopathy, no exudates.  Neck - Supple without adenopathy or thyromegaly. No bruits.  Lungs - Clear to auscultation without wheezes/rhonchi.  Heart - Regular rate and rhythm without murmurs, clicks, or gallops.  Nodes - No supraclavicular, axillary, or inguinal adenopathy palpable.  Breasts - deferred  Abdomen - Abdomen soft, non-tender. BS normal. No masses or hepatosplenomegaly palpable. No bruits.  Extremities -No cyanosis, clubbing or edema.    Musculoskeletal - Spine ROM normal. Muscular strength intact.  Bilateral bunions present first MTP joints.  Left worse than right  Peripheral pulses - radial=4/4, femoral=4/4, posterior tibial=4/4, dorsalis pedis=4/4,  Neuro - Gait slower but stable with use of a walker reflexes normal and symmetric. Sensation grossly WNL.  Genital/Rectal - deferred              Signed Electronically by: Rio Ospina MD

## 2024-03-11 NOTE — COMMUNITY RESOURCES LIST (ENGLISH)
03/11/2024   [facility]  N/A  For additional resource needs, please contact your health insurance member services or your primary care team.  Phone: [phone]   Email: N/A   Address: [address] [place] [id number]   Hours: N/A        Exercise and Recreation       Gym or workout facility  1  [employer] [facility] Distance: 1.46 miles      In-Person   [address] [place] [address]  Language: English  Hours: Mon - Sun Open 24 Hours  Fees: Insurance, Self Pay, Sliding Fee   Phone: [phone] Website: [email or web address]     2  [facility] Distance: 1.49 miles      In-Person   [address] [place] [address]  Language: English  Hours: Mon - Fri 5:00 AM - 9:00 PM , Sat - Sun 7:00 AM - 5:00 PM  Fees: Free, Insurance, Self Pay, Sliding Fee   Phone: [phone] Email: [email or web address] Website: [email or web address]          Important Numbers & Websites       [address]  Poison Control   (130) 684-1057 [place]  Suicide and Crisis Lifeline   [address]askiline.org  Childhelp National Child Abuse Hotline   950.789.7334 Childhelphotline.org  National Sexual Assault Hotline   (675) 532-1705 (HOPE) Rainn.org  National Runaway Safeline   (289) 577-1127 (RUNAWAY) [place] Healthrunaway.org  Pregnancy & Postpartum Support [place]   Call/text 968-639-0024 Ppsupport[place].org  Substance Abuse National Helpline ([facility]   801-819-HELP (2761) Findtreatment.gov  Emergency Services   911

## 2024-03-12 ENCOUNTER — TELEPHONE (OUTPATIENT)
Dept: INTERNAL MEDICINE | Facility: CLINIC | Age: 89
End: 2024-03-12
Payer: COMMERCIAL

## 2024-03-12 NOTE — TELEPHONE ENCOUNTER
-Patient contacted clinic stating that she had received a call from an unknown number and did not know if this call was relating to a recent visit with Dr. Ospina.      -Writer offered to transfer patient to scheduling as writer did not see any recent notes pertaining to a patient call attempt.  Patient states that she will just call tomorrow morning.

## 2024-04-04 DIAGNOSIS — F41.9 ANXIETY: ICD-10-CM

## 2024-04-04 RX ORDER — LORAZEPAM 0.5 MG/1
0.5 TABLET ORAL DAILY PRN
Qty: 30 TABLET | Refills: 5 | Status: SHIPPED | OUTPATIENT
Start: 2024-04-04

## 2024-06-06 ENCOUNTER — TELEPHONE (OUTPATIENT)
Dept: INTERNAL MEDICINE | Facility: CLINIC | Age: 89
End: 2024-06-06
Payer: COMMERCIAL

## 2024-06-06 NOTE — TELEPHONE ENCOUNTER
Prior Authorization Retail Medication Request    Medication/Dose: Lorazepam 0.5mg   Diagnosis and ICD code (if different than what is on RX):  [F41.9   New/renewal/insurance change PA/secondary ins. PA: renewal      Insurance   Primary:[employer]   Insurance ID:  [id number]       Pharmacy Information (if different than what is on RX)  Name:  [facility] [address]  Phone:  [phone]  Fax:[phone]

## 2024-06-10 DIAGNOSIS — F41.9 ANXIETY: ICD-10-CM

## 2024-06-10 RX ORDER — LORAZEPAM 0.5 MG/1
0.5 TABLET ORAL DAILY PRN
Qty: 30 TABLET | Refills: 5 | OUTPATIENT
Start: 2024-06-10

## 2024-06-14 ENCOUNTER — TELEPHONE (OUTPATIENT)
Dept: INTERNAL MEDICINE | Facility: CLINIC | Age: 89
End: 2024-06-14
Payer: COMMERCIAL

## 2024-06-14 NOTE — TELEPHONE ENCOUNTER
"Per pharmacy    \" Plan does not cover LORazepam (ATIVAN) 0.5 MG tablet.  Please call plan at [phone] to begin PA or call/fax pharmacy to change med. \"    PT ID  [id number]    [facility] [address]  "

## 2024-06-14 NOTE — TELEPHONE ENCOUNTER
Prior Authorization Approval    Medication: LORAZEPAM 0.5 MG PO TABS  Authorization Effective Date: 5/15/2024  Authorization Expiration Date: 6/14/2025  Approved Dose/Quantity:       Reference #:     Insurance Company: [employer] Non-Specialty PA's - Phone [phone] Fax [phone]  Expected CoPay: $    CoPay Card Available:      Financial Assistance Needed:   Which Pharmacy is filling the prescription: [id number] - [place], [place] - [address]  Pharmacy Notified: YES  Patient Notified: Instructed pharmacy to notify patient once order is ready.

## 2024-10-23 DIAGNOSIS — F41.9 ANXIETY: ICD-10-CM

## 2024-10-23 NOTE — TELEPHONE ENCOUNTER
Patient requesting lorazepam refill. Refill pended for provider consideration.     Thank you-    Swapna Vallecillo RN

## 2024-10-24 RX ORDER — LORAZEPAM 0.5 MG/1
0.5 TABLET ORAL DAILY PRN
Qty: 30 TABLET | Refills: 5 | Status: SHIPPED | OUTPATIENT
Start: 2024-10-24

## 2024-12-17 DIAGNOSIS — I10 ESSENTIAL HYPERTENSION, BENIGN: ICD-10-CM

## 2024-12-17 RX ORDER — IRBESARTAN 300 MG/1
300 TABLET ORAL DAILY
Qty: 90 TABLET | Refills: 1 | Status: SHIPPED | OUTPATIENT
Start: 2024-12-17

## 2025-01-18 DIAGNOSIS — F41.9 ANXIETY: ICD-10-CM

## 2025-01-18 DIAGNOSIS — F32.0 MILD MAJOR DEPRESSION: ICD-10-CM

## 2025-01-20 RX ORDER — BUSPIRONE HYDROCHLORIDE 15 MG/1
15 TABLET ORAL 2 TIMES DAILY
Qty: 180 TABLET | Refills: 3 | OUTPATIENT
Start: 2025-01-20

## 2025-02-05 ENCOUNTER — APPOINTMENT (OUTPATIENT)
Dept: CT IMAGING | Facility: CLINIC | Age: 89
End: 2025-02-05
Attending: EMERGENCY MEDICINE
Payer: COMMERCIAL

## 2025-02-05 ENCOUNTER — HOSPITAL ENCOUNTER (INPATIENT)
Facility: CLINIC | Age: 89
End: 2025-02-05
Attending: EMERGENCY MEDICINE | Admitting: INTERNAL MEDICINE
Payer: COMMERCIAL

## 2025-02-05 DIAGNOSIS — F41.9 ANXIETY: ICD-10-CM

## 2025-02-05 DIAGNOSIS — I10 ESSENTIAL HYPERTENSION, BENIGN: Primary | ICD-10-CM

## 2025-02-05 DIAGNOSIS — K58.1 IRRITABLE BOWEL SYNDROME WITH CONSTIPATION: ICD-10-CM

## 2025-02-05 DIAGNOSIS — E87.6 HYPOKALEMIA: ICD-10-CM

## 2025-02-05 DIAGNOSIS — N30.00 ACUTE CYSTITIS WITHOUT HEMATURIA: ICD-10-CM

## 2025-02-05 PROBLEM — E87.29 INCREASED ANION GAP METABOLIC ACIDOSIS: Status: ACTIVE | Noted: 2025-02-05

## 2025-02-05 PROBLEM — N39.0 URINARY TRACT INFECTION: Status: ACTIVE | Noted: 2025-02-05

## 2025-02-05 PROBLEM — E87.1 HYPONATREMIA: Status: ACTIVE | Noted: 2020-08-17

## 2025-02-05 PROBLEM — R11.2 NAUSEA & VOMITING: Status: ACTIVE | Noted: 2025-02-05

## 2025-02-05 LAB
ALBUMIN SERPL BCG-MCNC: 4.1 G/DL (ref 3.5–5.2)
ALBUMIN UR-MCNC: 30 MG/DL
ALP SERPL-CCNC: 63 U/L (ref 40–150)
ALT SERPL W P-5'-P-CCNC: 11 U/L (ref 0–50)
ANION GAP SERPL CALCULATED.3IONS-SCNC: 18 MMOL/L (ref 7–15)
APPEARANCE UR: CLEAR
AST SERPL W P-5'-P-CCNC: 24 U/L (ref 0–45)
ATRIAL RATE - MUSE: 100 BPM
BASE EXCESS BLDV CALC-SCNC: 3 MMOL/L (ref -3–3)
BASOPHILS # BLD AUTO: 0 10E3/UL (ref 0–0.2)
BASOPHILS NFR BLD AUTO: 0 %
BILIRUB SERPL-MCNC: 0.9 MG/DL
BILIRUB UR QL STRIP: NEGATIVE
BUN SERPL-MCNC: 13 MG/DL (ref 8–23)
CALCIUM SERPL-MCNC: 9.1 MG/DL (ref 8.8–10.4)
CHLORIDE SERPL-SCNC: 90 MMOL/L (ref 98–107)
COLOR UR AUTO: YELLOW
CPB POCT: YES
CREAT SERPL-MCNC: 0.51 MG/DL (ref 0.51–0.95)
DIASTOLIC BLOOD PRESSURE - MUSE: NORMAL MMHG
EGFRCR SERPLBLD CKD-EPI 2021: 90 ML/MIN/1.73M2
EOSINOPHIL # BLD AUTO: 0 10E3/UL (ref 0–0.7)
EOSINOPHIL NFR BLD AUTO: 0 %
ERYTHROCYTE [DISTWIDTH] IN BLOOD BY AUTOMATED COUNT: 12.5 % (ref 10–15)
GLUCOSE SERPL-MCNC: 105 MG/DL (ref 70–99)
GLUCOSE UR STRIP-MCNC: NEGATIVE MG/DL
HCO3 BLDV-SCNC: 25 MMOL/L (ref 21–28)
HCO3 SERPL-SCNC: 23 MMOL/L (ref 22–29)
HCT VFR BLD AUTO: 42.6 % (ref 35–47)
HCT VFR BLD CALC: 45 % (ref 35–47)
HGB BLD-MCNC: 15.2 G/DL (ref 11.7–15.7)
HGB BLD-MCNC: 15.3 G/DL (ref 11.7–15.7)
HGB UR QL STRIP: ABNORMAL
IMM GRANULOCYTES # BLD: 0 10E3/UL
IMM GRANULOCYTES NFR BLD: 0 %
INTERPRETATION ECG - MUSE: NORMAL
KETONES UR STRIP-MCNC: 10 MG/DL
LEUKOCYTE ESTERASE UR QL STRIP: ABNORMAL
LIPASE SERPL-CCNC: 33 U/L (ref 13–60)
LYMPHOCYTES # BLD AUTO: 1.2 10E3/UL (ref 0.8–5.3)
LYMPHOCYTES NFR BLD AUTO: 13 %
MCH RBC QN AUTO: 29.4 PG (ref 26.5–33)
MCHC RBC AUTO-ENTMCNC: 35.7 G/DL (ref 31.5–36.5)
MCV RBC AUTO: 82 FL (ref 78–100)
MONOCYTES # BLD AUTO: 1 10E3/UL (ref 0–1.3)
MONOCYTES NFR BLD AUTO: 11 %
MUCOUS THREADS #/AREA URNS LPF: PRESENT /LPF
NEUTROPHILS # BLD AUTO: 7 10E3/UL (ref 1.6–8.3)
NEUTROPHILS NFR BLD AUTO: 76 %
NITRATE UR QL: NEGATIVE
NRBC # BLD AUTO: 0 10E3/UL
NRBC BLD AUTO-RTO: 0 /100
P AXIS - MUSE: 52 DEGREES
PCO2 BLDV: 30 MM HG (ref 40–50)
PH BLDV: 7.54 [PH] (ref 7.32–7.43)
PH UR STRIP: 6 [PH] (ref 5–7)
PLATELET # BLD AUTO: 262 10E3/UL (ref 150–450)
PO2 BLDV: 56 MM HG (ref 25–47)
POTASSIUM BLD-SCNC: 2.9 MMOL/L (ref 3.4–5.3)
POTASSIUM SERPL-SCNC: 3.1 MMOL/L (ref 3.4–5.3)
PR INTERVAL - MUSE: 186 MS
PROT SERPL-MCNC: 6.8 G/DL (ref 6.4–8.3)
QRS DURATION - MUSE: 70 MS
QT - MUSE: 340 MS
QTC - MUSE: 438 MS
R AXIS - MUSE: 48 DEGREES
RBC # BLD AUTO: 5.17 10E6/UL (ref 3.8–5.2)
RBC URINE: 1 /HPF
SAO2 % BLDV: 92 % (ref 70–75)
SODIUM BLD-SCNC: 130 MMOL/L (ref 135–145)
SODIUM SERPL-SCNC: 131 MMOL/L (ref 135–145)
SP GR UR STRIP: 1.01 (ref 1–1.03)
SQUAMOUS EPITHELIAL: <1 /HPF
SYSTOLIC BLOOD PRESSURE - MUSE: NORMAL MMHG
T AXIS - MUSE: 15 DEGREES
UROBILINOGEN UR STRIP-MCNC: NORMAL MG/DL
VENTRICULAR RATE- MUSE: 100 BPM
WBC # BLD AUTO: 9.3 10E3/UL (ref 4–11)
WBC URINE: 90 /HPF

## 2025-02-05 PROCEDURE — 96374 THER/PROPH/DIAG INJ IV PUSH: CPT | Mod: 59

## 2025-02-05 PROCEDURE — 258N000003 HC RX IP 258 OP 636: Performed by: INTERNAL MEDICINE

## 2025-02-05 PROCEDURE — 83690 ASSAY OF LIPASE: CPT | Performed by: EMERGENCY MEDICINE

## 2025-02-05 PROCEDURE — 99291 CRITICAL CARE FIRST HOUR: CPT | Mod: 25

## 2025-02-05 PROCEDURE — 85004 AUTOMATED DIFF WBC COUNT: CPT | Performed by: EMERGENCY MEDICINE

## 2025-02-05 PROCEDURE — 82374 ASSAY BLOOD CARBON DIOXIDE: CPT | Performed by: EMERGENCY MEDICINE

## 2025-02-05 PROCEDURE — 84155 ASSAY OF PROTEIN SERUM: CPT | Performed by: EMERGENCY MEDICINE

## 2025-02-05 PROCEDURE — G0378 HOSPITAL OBSERVATION PER HR: HCPCS

## 2025-02-05 PROCEDURE — 250N000011 HC RX IP 250 OP 636: Performed by: EMERGENCY MEDICINE

## 2025-02-05 PROCEDURE — 87088 URINE BACTERIA CULTURE: CPT | Performed by: EMERGENCY MEDICINE

## 2025-02-05 PROCEDURE — 70450 CT HEAD/BRAIN W/O DYE: CPT

## 2025-02-05 PROCEDURE — 250N000009 HC RX 250: Performed by: EMERGENCY MEDICINE

## 2025-02-05 PROCEDURE — 82803 BLOOD GASES ANY COMBINATION: CPT

## 2025-02-05 PROCEDURE — 51798 US URINE CAPACITY MEASURE: CPT

## 2025-02-05 PROCEDURE — 250N000013 HC RX MED GY IP 250 OP 250 PS 637: Performed by: EMERGENCY MEDICINE

## 2025-02-05 PROCEDURE — 96375 TX/PRO/DX INJ NEW DRUG ADDON: CPT

## 2025-02-05 PROCEDURE — 74177 CT ABD & PELVIS W/CONTRAST: CPT

## 2025-02-05 PROCEDURE — 93005 ELECTROCARDIOGRAM TRACING: CPT

## 2025-02-05 PROCEDURE — 99223 1ST HOSP IP/OBS HIGH 75: CPT | Performed by: INTERNAL MEDICINE

## 2025-02-05 PROCEDURE — 36415 COLL VENOUS BLD VENIPUNCTURE: CPT | Performed by: EMERGENCY MEDICINE

## 2025-02-05 PROCEDURE — 81003 URINALYSIS AUTO W/O SCOPE: CPT | Performed by: EMERGENCY MEDICINE

## 2025-02-05 RX ORDER — AMOXICILLIN 250 MG
1 CAPSULE ORAL 2 TIMES DAILY PRN
Status: DISCONTINUED | OUTPATIENT
Start: 2025-02-05 | End: 2025-02-19 | Stop reason: HOSPADM

## 2025-02-05 RX ORDER — ONDANSETRON 2 MG/ML
4 INJECTION INTRAMUSCULAR; INTRAVENOUS EVERY 6 HOURS PRN
Status: DISCONTINUED | OUTPATIENT
Start: 2025-02-05 | End: 2025-02-19 | Stop reason: HOSPADM

## 2025-02-05 RX ORDER — CEFTRIAXONE 1 G/1
1 INJECTION, POWDER, FOR SOLUTION INTRAMUSCULAR; INTRAVENOUS EVERY 24 HOURS
Status: DISCONTINUED | OUTPATIENT
Start: 2025-02-06 | End: 2025-02-09

## 2025-02-05 RX ORDER — AMOXICILLIN 250 MG
2 CAPSULE ORAL 2 TIMES DAILY PRN
Status: DISCONTINUED | OUTPATIENT
Start: 2025-02-05 | End: 2025-02-19 | Stop reason: HOSPADM

## 2025-02-05 RX ORDER — POTASSIUM CHLORIDE 1.5 G/1.58G
40 POWDER, FOR SOLUTION ORAL ONCE
Status: COMPLETED | OUTPATIENT
Start: 2025-02-05 | End: 2025-02-05

## 2025-02-05 RX ORDER — ONDANSETRON 4 MG/1
4 TABLET, ORALLY DISINTEGRATING ORAL EVERY 6 HOURS PRN
Status: DISCONTINUED | OUTPATIENT
Start: 2025-02-05 | End: 2025-02-19 | Stop reason: HOSPADM

## 2025-02-05 RX ORDER — ONDANSETRON 2 MG/ML
4 INJECTION INTRAMUSCULAR; INTRAVENOUS EVERY 30 MIN PRN
Status: DISCONTINUED | OUTPATIENT
Start: 2025-02-05 | End: 2025-02-05

## 2025-02-05 RX ORDER — CEFTRIAXONE 1 G/1
1 INJECTION, POWDER, FOR SOLUTION INTRAMUSCULAR; INTRAVENOUS ONCE
Status: COMPLETED | OUTPATIENT
Start: 2025-02-05 | End: 2025-02-05

## 2025-02-05 RX ORDER — LIDOCAINE 40 MG/G
CREAM TOPICAL
Status: DISCONTINUED | OUTPATIENT
Start: 2025-02-05 | End: 2025-02-19 | Stop reason: HOSPADM

## 2025-02-05 RX ORDER — PROCHLORPERAZINE MALEATE 5 MG/1
5 TABLET ORAL EVERY 6 HOURS PRN
Status: DISCONTINUED | OUTPATIENT
Start: 2025-02-05 | End: 2025-02-19 | Stop reason: HOSPADM

## 2025-02-05 RX ORDER — ACETAMINOPHEN 325 MG/1
650 TABLET ORAL EVERY 4 HOURS PRN
Status: DISCONTINUED | OUTPATIENT
Start: 2025-02-05 | End: 2025-02-19 | Stop reason: HOSPADM

## 2025-02-05 RX ORDER — LORAZEPAM 2 MG/ML
0.5 INJECTION INTRAMUSCULAR ONCE
Status: COMPLETED | OUTPATIENT
Start: 2025-02-05 | End: 2025-02-05

## 2025-02-05 RX ORDER — IOPAMIDOL 755 MG/ML
67 INJECTION, SOLUTION INTRAVASCULAR ONCE
Status: COMPLETED | OUTPATIENT
Start: 2025-02-05 | End: 2025-02-05

## 2025-02-05 RX ORDER — ACETAMINOPHEN 650 MG/1
650 SUPPOSITORY RECTAL EVERY 4 HOURS PRN
Status: DISCONTINUED | OUTPATIENT
Start: 2025-02-05 | End: 2025-02-19 | Stop reason: HOSPADM

## 2025-02-05 RX ADMIN — CEFTRIAXONE SODIUM 1 G: 1 INJECTION, POWDER, FOR SOLUTION INTRAMUSCULAR; INTRAVENOUS at 18:22

## 2025-02-05 RX ADMIN — IOPAMIDOL 67 ML: 755 INJECTION, SOLUTION INTRAVENOUS at 14:14

## 2025-02-05 RX ADMIN — LORAZEPAM 0.5 MG: 2 INJECTION INTRAMUSCULAR; INTRAVENOUS at 14:02

## 2025-02-05 RX ADMIN — SODIUM CHLORIDE 500 ML: 9 INJECTION, SOLUTION INTRAVENOUS at 21:02

## 2025-02-05 RX ADMIN — POTASSIUM CHLORIDE 40 MEQ: 1.5 POWDER, FOR SOLUTION ORAL at 15:58

## 2025-02-05 RX ADMIN — ONDANSETRON 4 MG: 2 INJECTION, SOLUTION INTRAMUSCULAR; INTRAVENOUS at 13:19

## 2025-02-05 RX ADMIN — SODIUM CHLORIDE 64 ML: 9 INJECTION, SOLUTION INTRAVENOUS at 14:14

## 2025-02-05 ASSESSMENT — ACTIVITIES OF DAILY LIVING (ADL)
ADLS_ACUITY_SCORE: 41
ADLS_ACUITY_SCORE: 45
ADLS_ACUITY_SCORE: 52
ADLS_ACUITY_SCORE: 45
ADLS_ACUITY_SCORE: 45
ADLS_ACUITY_SCORE: 52
ADLS_ACUITY_SCORE: 45

## 2025-02-05 NOTE — ED TRIAGE NOTES
Patient BIBA from home. Family called 911 for chronic vomiting. Patient was evaluated by EMS yesterday but refused transport to ED. Patient has dementia, agitated. Patient is managing her own medications currently. Patient had multiple pills of lorazepam, had 30 pills a few days ago and now only has 1 pill left. Unsure where medications are going.    Glucose for EMS: 104    Hypertensive for EMS with systolic BPs in 200s.

## 2025-02-05 NOTE — H&P
"[facility]    History and Physical - Hospitalist Service       Date of Admission:  2/5/2025    Assessment & Plan      Kathy Braswell is a 89 year old female with history of hypertension, anxiety, depression, osteoporosis who presents to the emergency department with persistent nausea and vomiting.  Labs show hypokalemia and UTI.    Principal Problem:    Nausea & vomiting  Etiology unclear.  Possibly due to infectious/viral gastroenteritis.  Family attributes her symptoms to worsening anxiety.    [place] to observation  Supportive care with IV fluids, antiemetics  Add PPI  If symptoms persist, consider GI consult, endoscopy versus empiric Rx with PPI    Active Problems:    Hyponatremia    Hypokalemia  Likely hypovolemic hyponatremia.  Give NS bolus  Replace potassium per protocol  Recheck BMP      Essential hypertension  Continue PTA amlodipine 5 mg daily, PTA irbesartan 300 mg daily      Urinary tract infection  Received ceftriaxone in emergency department, continue ceftriaxone 1 g IV every 24  Follow-up urine culture results      Anxiety    Mild major depression  Continue PTA buspirone 15 mg twice daily, PTI lorazepam 0.5 mg daily as needed  Could increase Buspar to TID if uncontrolled anxiety persists       Possible cognitive impairment  Patient acknowledges, \"problems with my memory\"  Routine noncontrast head CT has no acute findings, does show brain atrophy and presumed chronic microvascular ischemic changes  PT, OT, social work consults requested.  May benefit from home care.        Diet:  Regular  DVT Prophylaxis: Pneumatic Compression Devices  Vargas Catheter: Not present  Lines: None     Cardiac Monitoring: None  Code Status:  Full code, unable to discussed with patient due to confusion, continue until advanced directive or legal decision maker available    Clinically Significant Risk Factors Present on Admission        # Hypokalemia: Lowest K = 2.9 mmol/L in last 2 days, will replace " "as needed  # Hyponatremia: Lowest Na = 130 mmol/L in last 2 days, will monitor as appropriate  # Hypochloremia: Lowest Cl = 90 mmol/L in last 2 days, will monitor as appropriate          # Hypertension: Noted on problem list                    Disposition Plan     Medically Ready for Discharge: Anticipated Tomorrow       Nicole Tinoco MD  Hospitalist Service  [facility]  Securely message with [employer] (more info)  Text page via [facility] Paging/Directory     ______________________________________________________________________    Chief Complaint   \"I am having problems with my memory.\"      History of Present Illness   Kathy Braswell is a 89 year old female ith history of hypertension, anxiety, depression, osteoporosis who presents to the emergency department with persistent nausea and vomiting.    Please see comments in ED note, \"The patient's family report increasing intermittent nausea and vomiting over the past few weeks, which they attribute to anxiety. The patient was having emesis episodes yesterday, however, non today. She has had similar symptoms in the past; family states last month she became anxious when she was unable to figure out her phone which resulted in an emesis episode. Family also notes, progressively worsening memory issues over the past 6 months. Upon evaluation, patient endorses mild nausea and headaches. She uses a walker to ambulate but has been struggling with it lately with increase balance issues.\"    Here, workup includes labs showing mild hyponatremia, sodium 131, potassium 2.9, WBC 9.3, hemoglobin 15.2, platelet count 262,000.  ABG with respiratory alkalosis as follows: 7.5 4/30/56/92.  Urinalysis has 90 WBCs/hpf, large leukocyte esterase.  Noncontrast head CT shows no acute intracranial process.  CT of the abdomen pelvis shows cholelithiasis without cholecystitis, no acute findings.    Kathy was given Ativan 0.5 mg IV x 1 for anxiety.  She says her nausea and " "anxiety have improved but she is confused.  She knows that this is [facility], cannot name the city (\"[facility]\"), month or year, stated that the season is, \"fall.\"  When I prompted her by telling her that there is snow on the ground she says, \"yes, I call that fall.\"  She said Eric told her to come to the hospital, initially thought Eric was her , then corrected herself, \"that is my dear little boy.\"  She seems flustered and says, \"that's not going to look good on the report, is it?\"  She is registered to observation.      Past Medical History    Past Medical History:   Diagnosis Date    Allergic rhinitis, cause unspecified     Closed compression fracture of L1 lumbar vertebral body 05/01/2015    after a fall    Esophageal reflux     Essential hypertension, benign     Generalized osteoarthrosis, unspecified site     HX COLON POLYP     Insomnia, unspecified     Irritable bowel syndrome 02/28/2015    Labyrinthitis, unspecified     Mild major depression     Nasal/sinus dis NEC     OSTEOPENIA     Pubic ramus fracture (H) 07/01/2014    Nondisplaced right superior and inferior pubic ramus fractures    Unspecified closed fracture of ankle        Past Surgical History   Past Surgical History:   Procedure Laterality Date    PHACOEMULSIFICATION CLEAR CORNEA WITH STANDARD INTRAOCULAR LENS IMPLANT  5/15/2013    Procedure: PHACOEMULSIFICATION CLEAR CORNEA WITH STANDARD INTRAOCULAR LENS IMPLANT;  RIGHT PHACOEMULSIFICATION CLEAR CORNEA WITH STANDARD INTRAOCULAR LENS IMPLANT  ;  Surgeon: Soy Hall MD;  Location: [facility]    PHACOEMULSIFICATION CLEAR CORNEA WITH STANDARD INTRAOCULAR LENS IMPLANT  10/30/2013    Procedure: PHACOEMULSIFICATION CLEAR CORNEA WITH STANDARD INTRAOCULAR LENS IMPLANT;  LEFT PHACOEMULSIFICATION CLEAR CORNEA WITH STANDARD INTRAOCULAR LENS IMPLANT;  Surgeon: Soy Hall MD;  Location: [facility]    ZC NONSPECIFIC PROCEDURE      excision of cyst     Z NONSPECIFIC PROCEDURE      " D&C x 2 after miscarriage and menometrorrhagia    [facility] NONSPECIFIC PROCEDURE  2/99    colonoscopy       Prior to Admission Medications   Prior to Admission Medications   Prescriptions Last Dose Informant Patient Reported? Taking?   FLUoxetine (PROZAC) 20 MG capsule   No No   Sig: Take 1 capsule (20 mg) by mouth daily   LORazepam (ATIVAN) 0.5 MG tablet   No No   Sig: Take 1 tablet (0.5 mg) by mouth daily as needed for anxiety.   acetaminophen (TYLENOL) 325 MG tablet   Yes No   Sig: Take 1-2 tablets (325-650 mg) by mouth every 6 hours as needed for mild pain   amLODIPine (NORVASC) 5 MG tablet   No No   Sig: Take 1 tablet (5 mg) by mouth daily   busPIRone (BUSPAR) 15 MG tablet   No No   Sig: Take 1 tablet (15 mg) by mouth 2 times daily   cholecalciferol (VITAMIN D) 1000 UNIT tablet   Yes No   Sig: Take 1 tablet by mouth daily.   irbesartan (AVAPRO) 300 MG tablet   No No   Sig: TAKE 1 TABLET(300 MG) BY MOUTH DAILY      Facility-Administered Medications: None           Physical Exam   Vital Signs: Temp: 98.5  F (36.9  C) Temp src: Temporal BP: (!) 146/80 Pulse: 110   Resp: 18 SpO2: 96 % O2 Device: None (Room air)    Weight: 0 lbs 0 oz    Constitutional: Awake, alert, cooperative, no apparent distress  Respiratory: Clear to auscultation bilaterally, no crackles or wheezing  Cardiovascular: irregular rhythm   GI: Normal bowel sounds, soft, non-distended, non-tender  Skin/Integumen: No rash on exposed skin.  No lower extremity edema  Other: Mood seems flustered      Medical Decision Making       75 MINUTES SPENT BY ME on the date of service doing chart review, history, exam, documentation & further activities per the note.      Data     I have personally reviewed the following data over the past 24 hrs:    9.3  \   15.3   / 262     130 (L) 90 (L) 13.0 /  105 (H)   2.9 (L) 23 0.51 \     ALT: 11 AST: 24 AP: 63 TBILI: 0.9   ALB: 4.1 TOT PROTEIN: 6.8 LIPASE: 33       Imaging results reviewed over the past 24 hrs:   Recent  Results (from the past 24 hours)   CT Head w/o Contrast    Narrative    EXAM: CT HEAD W/O CONTRAST  LOCATION: [facility]  DATE: 2/5/2025    INDICATION: Headache, vomiting.  COMPARISON: None.  TECHNIQUE: Routine CT Head without IV contrast. Multiplanar reformats. Dose reduction techniques were used.    FINDINGS:  INTRACRANIAL CONTENTS: No intracranial hemorrhage, extraaxial collection, or mass effect. No CT evidence of acute infarct. Moderate presumed chronic small vessel ischemic changes. Mild to moderate generalized volume loss. No hydrocephalus. Corpus   callosum is normal. Cerebellar tonsillar position is satisfactory. No sella or suprasellar mass/hemorrhage. Vascular calcification.     VISUALIZED ORBITS/SINUSES/MASTOIDS: Prior bilateral cataract surgery. Visualized portions of the orbits are otherwise unremarkable. No paranasal sinus mucosal disease. No middle ear or mastoid effusion.    BONES/SOFT TISSUES: No scalp hematoma. No skull fracture. Degenerative changes both TMJs. Demineralization of the skull base. Nasopharynx is patent.      Impression    IMPRESSION:  1.  No CT evidence for acute intracranial process.    2.  Brain atrophy and presumed chronic microvascular ischemic changes as above.   CT Abdomen Pelvis w Contrast    Narrative    EXAM: CT ABDOMEN PELVIS W CONTRAST  LOCATION: [facility]  DATE: 2/5/2025    INDICATION: Vomiting, new, persistent.  COMPARISON: 12/17/2014  TECHNIQUE: CT scan of the abdomen and pelvis was performed following injection of IV contrast. Multiplanar reformats were obtained. Dose reduction techniques were used.  CONTRAST: 67mL Isovue 370.    FINDINGS:   LOWER CHEST: Minimal scattered atelectasis and/or fibrosis. Tiny hiatal hernia.    HEPATOBILIARY: Normal contour with no significant mass. No bile duct dilatation. Calcified gallstones.    PANCREAS: No significant mass, duct dilatation, or inflammatory change.    SPLEEN: Normal  size.    ADRENAL GLANDS: Mild thickening bilaterally.    KIDNEYS/BLADDER: No significant mass, stones, or hydronephrosis. There are simple or benign cysts. No follow up is needed. Low-attenuation subcentimeter renal lesion(s) compatible with benign cysts or other benign lesions. No routine follow-up is   recommended in a low-risk patient.    BOWEL: No obstruction or inflammatory change.    LYMPH NODES: No lymphadenopathy.    VASCULATURE: There are mild atherosclerotic changes of the visualized aorta and its branches. There is no evidence of aortic dissection or aneurysm.    PELVIC ORGANS: No pelvic masses.    MUSCULOSKELETAL: No frankly destructive bony lesions. Age-indeterminate compression deformities of L1 and L3. Posttraumatic deformities of the right superior and inferior pubic ramus.      Impression    IMPRESSION:   1.  Cholelithiasis without cholecystitis.  2.  No bowel obstruction.

## 2025-02-05 NOTE — ED PROVIDER NOTES
Emergency Department Note      History of Present Illness     Chief Complaint   Nausea & Vomiting and Altered Mental Status      HPI   Kathy Braswell is a 89 year old female with a history of hypertension and depression presenting with an altered mental status, nausea and vomiting. The patient's family report increasing intermittent nausea and vomiting over the past few weeks, which they attribute to anxiety. The patient was having emesis episodes yesterday, however, non today. She has had similar symptoms in the past; family states last month she became anxious when she was unable to figure out her phone which resulted in an emesis episode. Family also notes, progressively worsening memory issues over the past 6 months. Upon evaluation, patient endorses mild nausea and headaches. She uses a walker to ambulate but has been struggling with it lately with increase balance issues. She denies chest pain, abdominal pain, hematemesis, diarrhea, fevers, cough or rhinorrhea. She does not drink or smoke. Of note, her  passed away in 2012.     Independent Historian   Family as detailed above.    Review of External Notes   Internal medicine office visit on 9/11/23 for an overview of the patients medical history     Past Medical History     Medical History and Problem List   Allergic rhinitis  Closed compression fracture of L1 lumbar vertebral body  Esophageal reflux  Essential hypertension,   Generalized osteoarthrosis  Colon polyps  Insomnia, unspecified  Irritable bowel syndrome  Labyrinthitis  Mild major depression  Pubic ramus fracture   Closed fracture of ankle    Medications   Amlodipine  Buspirone   Fluoxetine   Irbesartan   Lorazepam     Surgical History   Phacoemulsification clear cornea with standard intraocular lens implant     Excision of cyst   D&C x 2 after miscarriage and menometrorrhagia   Colonoscopy    Physical Exam     Patient Vitals for the past 24 hrs:   BP Temp Temp src Pulse Resp SpO2   02/05/25  1500 (!) 146/80 -- -- 110 -- 96 %   02/05/25 1400 138/68 -- -- 97 -- 96 %   02/05/25 1228 (!) 185/84 98.5  F (36.9  C) Temporal 95 18 97 %     Physical Exam  Constitutional: Well developed, nontox appearance  Head: Atraumatic.   Mouth/Throat: Oropharynx is clear and moist.   Neck:  no stridor  Eyes: no scleral icterus  Cardiovascular: RRR, 2+ bilat radial pulses  Pulmonary/Chest: nml resp effort, Clear BS bilat  Abdominal: ND, soft, NT, no rebound or guarding   Ext: Warm, well perfused, no edema  Neurological: A&O, symmetric facies, moves ext x4  Skin: Skin is warm and dry.   Psychiatric: Anxious, minimally tearful  Nursing note and vitals reviewed.      Diagnostics     Lab Results   Labs Ordered and Resulted from Time of ED Arrival to Time of ED Departure   COMPREHENSIVE METABOLIC PANEL - Abnormal       Result Value    Sodium 131 (*)     Potassium 3.1 (*)     Carbon Dioxide (CO2) 23      Anion Gap 18 (*)     Urea Nitrogen 13.0      Creatinine 0.51      GFR Estimate 90      Calcium 9.1      Chloride 90 (*)     Glucose 105 (*)     Alkaline Phosphatase 63      AST 24      ALT 11      Protein Total 6.8      Albumin 4.1      Bilirubin Total 0.9     ISTAT GASES ELECTROLYTES VENOUS POCT - Abnormal    CPB Applied Yes      Hematocrit POCT 45      Bicarbonate Venous POCT 25      Hemoglobin POCT 15.3      Potassium POCT 2.9 (*)     Sodium POCT 130 (*)     pCO2 Venous POCT 30 (*)     pH Venous POCT 7.54 (*)     pO2 Venous POCT 56 (*)     O2 Sat, Venous POCT 92 (*)     Base Excess/Deficit (+/-) POCT 3.0     ROUTINE UA WITH MICROSCOPIC REFLEX TO CULTURE - Abnormal    Color Urine Yellow      Appearance Urine Clear      Glucose Urine Negative      Bilirubin Urine Negative      Ketones Urine 10 (*)     Specific Gravity Urine 1.010      Blood Urine Trace (*)     pH Urine 6.0      Protein Albumin Urine 30 (*)     Urobilinogen Urine Normal      Nitrite Urine Negative      Leukocyte Esterase Urine Large (*)     Mucus Urine Present (*)      RBC Urine 1      WBC Urine 90 (*)     Squamous Epithelials Urine <1     LIPASE - Normal    Lipase 33     CBC WITH PLATELETS AND DIFFERENTIAL    WBC Count 9.3      RBC Count 5.17      Hemoglobin 15.2      Hematocrit 42.6      MCV 82      MCH 29.4      MCHC 35.7      RDW 12.5      Platelet Count 262      % Neutrophils 76      % Lymphocytes 13      % Monocytes 11      % Eosinophils 0      % Basophils 0      % Immature Granulocytes 0      NRBCs per 100 WBC 0      Absolute Neutrophils 7.0      Absolute Lymphocytes 1.2      Absolute Monocytes 1.0      Absolute Eosinophils 0.0      Absolute Basophils 0.0      Absolute Immature Granulocytes 0.0      Absolute NRBCs 0.0     ISTAT CREATININE POCT   URINE CULTURE       Imaging   CT Abdomen Pelvis w Contrast   Final Result   IMPRESSION:    1.  Cholelithiasis without cholecystitis.   2.  No bowel obstruction.      CT Head w/o Contrast   Final Result   IMPRESSION:   1.  No CT evidence for acute intracranial process.      2.  Brain atrophy and presumed chronic microvascular ischemic changes as above.            Independent Interpretation   CT head negative for intracranial hemorrhage    ED Course      Medications Administered   Medications   ondansetron (ZOFRAN) injection 4 mg (4 mg Intravenous $Given 2/5/25 1319)   cefTRIAXone (ROCEPHIN) 1 g vial to attach to  mL bag for ADULTS or NS 50 mL bag for PEDS (has no administration in time range)   LORazepam (ATIVAN) injection 0.5 mg (0.5 mg Intravenous $Given 2/5/25 1402)   iopamidol (ISOVUE-370) solution 67 mL (67 mLs Intravenous $Given 2/5/25 1414)   Saline flush (64 mLs Intravenous $Given 2/5/25 1414)   potassium chloride (KLOR-CON) Packet 40 mEq (40 mEq Oral $Given 2/5/25 2858)       Procedures   Procedures     Discussion of Management   Admitting Hospitalist, Earnest    ED Course   ED Course as of 02/05/25 1726   Wed Feb 05, 2025   1247 I obtained the history and examined the patient as noted above.         Additional  Documentation  Social determinants include age increasing risk for presentation to the emergency department    Medical Decision Making / Diagnosis     CMS Diagnoses: IV Antibiotics given and/or elevated Lactate of 0 and no sepsis note found - Delete this reminder and enter the sepsis note or '.edcms' before signing chart.>>>None    MIPS       None    MDM   Kathy Braswell is a 89 year old female presenting with nausea and vomiting, increased anxiety    Differential diagnosis includes panic attack, generalized anxiety disorder, obstruction, constipation, electrolyte abnormality, UTI, generalized weakness NOS.  Labs significant for UA consistent with infection, mild hypokalemia possibly secondary to respiratory alkalosis due to severe anxiety.  Patient given medications as noted above with improvement in anxiety.  Given increased weakness and concern for right management at home in context of an acute UTI, patient was admitted to the hospital service for further evaluation and management.  Antibiotics given as noted above.  Patient and family were counseled on the results, diagnosis and disposition prior to admission.  They are understanding and agreeable to plan.  Presentation seems consistent with acute anxiety and UTI.  Patient may have underlying dementia which may be contributing to her anxiety.    Disposition   The patient was admitted to the hospital.     Diagnosis     ICD-10-CM    1. Acute cystitis without hematuria  N30.00       2. Hypokalemia  E87.6            Discharge Medications   New Prescriptions    No medications on file         Scribe Disclosure:  I, Heather Lau, am serving as a scribe at 1:01 PM on 2/5/2025 to document services personally performed by Jaiden Carreon MD based on my observations and the provider's statements to me.        Jaiden Carreon MD  02/05/25 0605

## 2025-02-06 ENCOUNTER — APPOINTMENT (OUTPATIENT)
Dept: OCCUPATIONAL THERAPY | Facility: CLINIC | Age: 89
End: 2025-02-06
Attending: INTERNAL MEDICINE
Payer: COMMERCIAL

## 2025-02-06 ENCOUNTER — APPOINTMENT (OUTPATIENT)
Dept: PHYSICAL THERAPY | Facility: CLINIC | Age: 89
End: 2025-02-06
Attending: INTERNAL MEDICINE
Payer: COMMERCIAL

## 2025-02-06 ENCOUNTER — APPOINTMENT (OUTPATIENT)
Dept: SPEECH THERAPY | Facility: CLINIC | Age: 89
End: 2025-02-06
Attending: PHYSICIAN ASSISTANT
Payer: COMMERCIAL

## 2025-02-06 VITALS
BODY MASS INDEX: 26.95 KG/M2 | WEIGHT: 128.97 LBS | SYSTOLIC BLOOD PRESSURE: 167 MMHG | DIASTOLIC BLOOD PRESSURE: 71 MMHG | HEART RATE: 80 BPM | TEMPERATURE: 98.4 F | OXYGEN SATURATION: 96 % | RESPIRATION RATE: 16 BRPM

## 2025-02-06 PROBLEM — R41.0 DELIRIUM: Status: ACTIVE | Noted: 2025-02-06

## 2025-02-06 LAB
ANION GAP SERPL CALCULATED.3IONS-SCNC: 18 MMOL/L (ref 7–15)
ATRIAL RATE - MUSE: 102 BPM
BACTERIA UR CULT: NORMAL
BUN SERPL-MCNC: 10.3 MG/DL (ref 8–23)
CALCIUM SERPL-MCNC: 9.1 MG/DL (ref 8.8–10.4)
CHLORIDE SERPL-SCNC: 95 MMOL/L (ref 98–107)
CREAT SERPL-MCNC: 0.57 MG/DL (ref 0.51–0.95)
DIASTOLIC BLOOD PRESSURE - MUSE: NORMAL MMHG
EGFRCR SERPLBLD CKD-EPI 2021: 87 ML/MIN/1.73M2
ERYTHROCYTE [DISTWIDTH] IN BLOOD BY AUTOMATED COUNT: 12.7 % (ref 10–15)
GLUCOSE SERPL-MCNC: 98 MG/DL (ref 70–99)
HCO3 SERPL-SCNC: 22 MMOL/L (ref 22–29)
HCT VFR BLD AUTO: 43.7 % (ref 35–47)
HGB BLD-MCNC: 15.5 G/DL (ref 11.7–15.7)
INTERPRETATION ECG - MUSE: NORMAL
MAGNESIUM SERPL-MCNC: 2 MG/DL (ref 1.7–2.3)
MCH RBC QN AUTO: 29.6 PG (ref 26.5–33)
MCHC RBC AUTO-ENTMCNC: 35.5 G/DL (ref 31.5–36.5)
MCV RBC AUTO: 84 FL (ref 78–100)
P AXIS - MUSE: 68 DEGREES
PLATELET # BLD AUTO: 276 10E3/UL (ref 150–450)
POTASSIUM SERPL-SCNC: 3.3 MMOL/L (ref 3.4–5.3)
POTASSIUM SERPL-SCNC: 3.5 MMOL/L (ref 3.4–5.3)
PR INTERVAL - MUSE: 186 MS
QRS DURATION - MUSE: 80 MS
QT - MUSE: 364 MS
QTC - MUSE: 474 MS
R AXIS - MUSE: 60 DEGREES
RBC # BLD AUTO: 5.23 10E6/UL (ref 3.8–5.2)
SODIUM SERPL-SCNC: 135 MMOL/L (ref 135–145)
SYSTOLIC BLOOD PRESSURE - MUSE: NORMAL MMHG
T AXIS - MUSE: 49 DEGREES
TSH SERPL DL<=0.005 MIU/L-ACNC: 4.03 UIU/ML (ref 0.3–4.2)
VENTRICULAR RATE- MUSE: 102 BPM
WBC # BLD AUTO: 9.1 10E3/UL (ref 4–11)

## 2025-02-06 PROCEDURE — 97116 GAIT TRAINING THERAPY: CPT | Mod: GP | Performed by: PHYSICAL THERAPIST

## 2025-02-06 PROCEDURE — 85048 AUTOMATED LEUKOCYTE COUNT: CPT | Performed by: INTERNAL MEDICINE

## 2025-02-06 PROCEDURE — 93005 ELECTROCARDIOGRAM TRACING: CPT

## 2025-02-06 PROCEDURE — 120N000001 HC R&B MED SURG/OB

## 2025-02-06 PROCEDURE — 36415 COLL VENOUS BLD VENIPUNCTURE: CPT | Performed by: PHYSICIAN ASSISTANT

## 2025-02-06 PROCEDURE — 92610 EVALUATE SWALLOWING FUNCTION: CPT | Mod: GN | Performed by: SPEECH-LANGUAGE PATHOLOGIST

## 2025-02-06 PROCEDURE — 83735 ASSAY OF MAGNESIUM: CPT | Performed by: PHYSICIAN ASSISTANT

## 2025-02-06 PROCEDURE — 250N000011 HC RX IP 250 OP 636: Performed by: INTERNAL MEDICINE

## 2025-02-06 PROCEDURE — 97535 SELF CARE MNGMENT TRAINING: CPT | Mod: GO

## 2025-02-06 PROCEDURE — 85018 HEMOGLOBIN: CPT | Performed by: INTERNAL MEDICINE

## 2025-02-06 PROCEDURE — 97530 THERAPEUTIC ACTIVITIES: CPT | Mod: GP | Performed by: PHYSICAL THERAPIST

## 2025-02-06 PROCEDURE — 250N000011 HC RX IP 250 OP 636: Performed by: HOSPITALIST

## 2025-02-06 PROCEDURE — G0378 HOSPITAL OBSERVATION PER HR: HCPCS

## 2025-02-06 PROCEDURE — 99233 SBSQ HOSP IP/OBS HIGH 50: CPT | Performed by: PHYSICIAN ASSISTANT

## 2025-02-06 PROCEDURE — 97161 PT EVAL LOW COMPLEX 20 MIN: CPT | Mod: GP | Performed by: PHYSICAL THERAPIST

## 2025-02-06 PROCEDURE — 36415 COLL VENOUS BLD VENIPUNCTURE: CPT | Performed by: INTERNAL MEDICINE

## 2025-02-06 PROCEDURE — 96375 TX/PRO/DX INJ NEW DRUG ADDON: CPT

## 2025-02-06 PROCEDURE — 250N000013 HC RX MED GY IP 250 OP 250 PS 637: Performed by: PHYSICIAN ASSISTANT

## 2025-02-06 PROCEDURE — 84132 ASSAY OF SERUM POTASSIUM: CPT | Performed by: PHYSICIAN ASSISTANT

## 2025-02-06 PROCEDURE — 80048 BASIC METABOLIC PNL TOTAL CA: CPT | Performed by: INTERNAL MEDICINE

## 2025-02-06 PROCEDURE — 258N000001 HC RX 258: Performed by: PHYSICIAN ASSISTANT

## 2025-02-06 PROCEDURE — 97165 OT EVAL LOW COMPLEX 30 MIN: CPT | Mod: GO

## 2025-02-06 PROCEDURE — 250N000009 HC RX 250: Performed by: INTERNAL MEDICINE

## 2025-02-06 PROCEDURE — 84443 ASSAY THYROID STIM HORMONE: CPT | Performed by: PHYSICIAN ASSISTANT

## 2025-02-06 PROCEDURE — 99418 PROLNG IP/OBS E/M EA 15 MIN: CPT | Performed by: PHYSICIAN ASSISTANT

## 2025-02-06 PROCEDURE — 93010 ELECTROCARDIOGRAM REPORT: CPT | Performed by: INTERNAL MEDICINE

## 2025-02-06 RX ORDER — HYDRALAZINE HYDROCHLORIDE 20 MG/ML
10 INJECTION INTRAMUSCULAR; INTRAVENOUS EVERY 4 HOURS PRN
Status: DISCONTINUED | OUTPATIENT
Start: 2025-02-06 | End: 2025-02-19 | Stop reason: HOSPADM

## 2025-02-06 RX ORDER — POTASSIUM CHLORIDE 1500 MG/1
40 TABLET, EXTENDED RELEASE ORAL ONCE
Status: COMPLETED | OUTPATIENT
Start: 2025-02-06 | End: 2025-02-06

## 2025-02-06 RX ORDER — AMLODIPINE BESYLATE 5 MG/1
5 TABLET ORAL DAILY
Status: DISCONTINUED | OUTPATIENT
Start: 2025-02-06 | End: 2025-02-19 | Stop reason: HOSPADM

## 2025-02-06 RX ORDER — IRBESARTAN 150 MG/1
300 TABLET ORAL DAILY
Status: DISCONTINUED | OUTPATIENT
Start: 2025-02-06 | End: 2025-02-19 | Stop reason: HOSPADM

## 2025-02-06 RX ORDER — CARVEDILOL 3.12 MG/1
6.25 TABLET ORAL 2 TIMES DAILY WITH MEALS
Status: DISCONTINUED | OUTPATIENT
Start: 2025-02-06 | End: 2025-02-19 | Stop reason: HOSPADM

## 2025-02-06 RX ORDER — DEXTROSE MONOHYDRATE, SODIUM CHLORIDE, AND POTASSIUM CHLORIDE 50; 1.49; 9 G/1000ML; G/1000ML; G/1000ML
INJECTION, SOLUTION INTRAVENOUS ONCE
Status: COMPLETED | OUTPATIENT
Start: 2025-02-06 | End: 2025-02-06

## 2025-02-06 RX ORDER — BUSPIRONE HYDROCHLORIDE 15 MG/1
15 TABLET ORAL 2 TIMES DAILY
Status: DISCONTINUED | OUTPATIENT
Start: 2025-02-06 | End: 2025-02-07

## 2025-02-06 RX ADMIN — IRBESARTAN 300 MG: 150 TABLET ORAL at 09:39

## 2025-02-06 RX ADMIN — FLUOXETINE HYDROCHLORIDE 20 MG: 20 CAPSULE ORAL at 09:39

## 2025-02-06 RX ADMIN — BUSPIRONE HYDROCHLORIDE 15 MG: 15 TABLET ORAL at 09:39

## 2025-02-06 RX ADMIN — CEFTRIAXONE SODIUM 1 G: 1 INJECTION, POWDER, FOR SOLUTION INTRAMUSCULAR; INTRAVENOUS at 17:46

## 2025-02-06 RX ADMIN — POTASSIUM CHLORIDE 40 MEQ: 1500 TABLET, EXTENDED RELEASE ORAL at 05:53

## 2025-02-06 RX ADMIN — POTASSIUM CHLORIDE, DEXTROSE MONOHYDRATE AND SODIUM CHLORIDE: 150; 5; 900 INJECTION, SOLUTION INTRAVENOUS at 10:25

## 2025-02-06 RX ADMIN — AMLODIPINE BESYLATE 5 MG: 5 TABLET ORAL at 09:39

## 2025-02-06 RX ADMIN — CARVEDILOL 6.25 MG: 3.12 TABLET, FILM COATED ORAL at 17:46

## 2025-02-06 RX ADMIN — HYDRALAZINE HYDROCHLORIDE 10 MG: 20 INJECTION INTRAMUSCULAR; INTRAVENOUS at 07:02

## 2025-02-06 RX ADMIN — PANTOPRAZOLE SODIUM 40 MG: 40 INJECTION, POWDER, FOR SOLUTION INTRAVENOUS at 08:10

## 2025-02-06 RX ADMIN — BUSPIRONE HYDROCHLORIDE 15 MG: 15 TABLET ORAL at 20:50

## 2025-02-06 ASSESSMENT — ACTIVITIES OF DAILY LIVING (ADL)
ADLS_ACUITY_SCORE: 74
ADLS_ACUITY_SCORE: 52
ADLS_ACUITY_SCORE: 70
ADLS_ACUITY_SCORE: 51
ADLS_ACUITY_SCORE: 74
PREVIOUS_RESPONSIBILITIES: MEAL PREP;HOUSEKEEPING;LAUNDRY;MEDICATION MANAGEMENT;FINANCES
ADLS_ACUITY_SCORE: 70
ADLS_ACUITY_SCORE: 65
ADLS_ACUITY_SCORE: 51
ADLS_ACUITY_SCORE: 70
ADLS_ACUITY_SCORE: 74
ADLS_ACUITY_SCORE: 74
ADLS_ACUITY_SCORE: 50
ADLS_ACUITY_SCORE: 51
ADLS_ACUITY_SCORE: 74
ADLS_ACUITY_SCORE: 52
ADLS_ACUITY_SCORE: 74
ADLS_ACUITY_SCORE: 50
ADLS_ACUITY_SCORE: 70
ADLS_ACUITY_SCORE: 74
ADLS_ACUITY_SCORE: 74
ADLS_ACUITY_SCORE: 51
ADLS_ACUITY_SCORE: 74
ADLS_ACUITY_SCORE: 74

## 2025-02-06 NOTE — PROGRESS NOTES
02/06/25 1000   Appointment Info   Signing Clinician's Name / Credentials (OT) Cole Granda OTR/L   Quick Adds   Quick Adds Certification   Living Environment   People in Home sibling(s)  (Sister)   Current Living Arrangements house   Home Accessibility stairs to enter home;stairs within home   Number of Stairs, Main Entrance 3   Stair Railings, Main Entrance railings safe and in good condition   Number of Stairs, Within Home, Primary eight  (Pt does not navigate often)   Transportation Anticipated family or friend will provide   Living Environment Comments Pt reports living in house w/ sister w/ 3 VADIM. Tub shower.   Self-Care   Usual Activity Tolerance moderate   Current Activity Tolerance fair   Equipment Currently Used at Home walker, rolling   Fall history within last six months no   Activity/Exercise/Self-Care Comment Pt reports being IND w/ all ADL's at baseline prior to admission.   Instrumental Activities of Daily Living (IADL)   Previous Responsibilities meal prep;housekeeping;laundry;medication management;finances   IADL Comments Pt reports being IND w/ all IADL's at baseline. Pt does not drive.   General Information   Onset of Illness/Injury or Date of Surgery 02/05/25   Referring Physician Nicole Tinoco MD   Patient/Family Therapy Goal Statement (OT) Return to home.   Additional Occupational Profile Info/Pertinent History of Current Problem Kathy Braswell is a 89 year old female with history of hypertension, anxiety, depression, osteoporosis who presents to the emergency department with persistent nausea and vomiting.  Labs show hypokalemia and UTI.   Existing Precautions/Restrictions fall   Cognitive Status Examination   Orientation Status person;place  (Not oriented to year)   Visual Perception   Visual Impairment/Limitations corrective lenses for reading   Sensory   Sensory Quick Adds sensation intact   Pain Assessment   Patient Currently in Pain No   Range of Motion Comprehensive   General  Range of Motion no range of motion deficits identified   Strength Comprehensive (MMT)   Comment, General Manual Muscle Testing (MMT) Assessment Generalized weakness noted bilaterally   Bed Mobility   Bed Mobility supine-sit;sit-supine   Sit-Supine [place] (Bed Mobility) contact guard   Comment (Bed Mobility) SBA   Transfers   Transfers sit-stand transfer;toilet transfer   Sit-Stand Transfer   Assistive Device (Sit-Stand Transfers) walker, front-wheeled   Sit/Stand Transfer Comments SBA   Toilet Transfer   Type (Toilet Transfer) stand-sit;sit-stand   Assistive Device (Toilet Transfer) walker, front-wheeled   Toilet Transfer Comments SBA   Activities of Daily Living   BADL Assessment/Intervention lower body dressing;grooming;toileting   Lower Body Dressing Assessment/Training   Position (Lower Body Dressing) edge of bed sitting   [place] Level (Lower Body Dressing) supervision   Grooming Assessment/Training   Position (Grooming) sink side   Comment, (Grooming) SBA   Toileting   Comment, (Toileting) SBA   Clinical Impression   Criteria for Skilled Therapeutic Interventions Met (OT) Yes, treatment indicated   OT Diagnosis Decreased ADL independence and activity tolerance   OT Problem List-Impairments impacting ADL problems related to;activity tolerance impaired;cognition;strength   Assessment of Occupational Performance 1-3 Performance Deficits   Identified Performance Deficits Home mgmt, standing g/h   Planned Therapy Interventions (OT) ADL retraining;IADL retraining;cognition;home program guidelines;progressive activity/exercise;risk factor education;strengthening   Clinical Decision Making Complexity (OT) problem focused assessment/low complexity   Risk & Benefits of therapy have been explained evaluation/treatment results reviewed;care plan/treatment goals reviewed;risks/benefits reviewed;current/potential barriers reviewed;patient   OT Total Evaluation Time   OT Eval, Low Complexity Minutes (83663) 10  "  Therapy Certification   Medical Diagnosis Decreased ADL independence   Start of Care Date 02/06/25   Certification date from 02/06/25   Certification date to 02/13/25   OT Goals   Therapy Frequency (OT) Daily   OT Predicted Duration/Target Date for Goal Attainment 02/13/25   OT Goals Hygiene/Grooming;Upper Body Dressing;Lower Body Dressing;Cognition   OT: Hygiene/Grooming modified independent;while standing   OT: Upper Body Dressing Modified independent;including set-up/clothing retrieval   OT: Lower Body Dressing Modified independent;including set-up/clothing retrieval   OT: Cognitive Patient/caregiver will verbalize understanding of cognitive assessment results/recommendations as needed for safe discharge planning   Self-Care/Home Management   Self-Care/Home Mgmt/ADL, Compensatory, Meal Prep Minutes (03459) 18   Symptoms Noted During/After Treatment (Meal Preparation/Planning Training) fatigue   Treatment Detail/Skilled Intervention Pt supine in bed w/ nursing assistant present in room; agreeable for OT evaluation. Pt pleasantly confused and was attempting to call her sister throughout the session. Pt not oriented to year and at times stating to writer, \"I will show you my bedroom after this.\" SBA for pt complete sup > sit EOB w/ head of bed elevated. Pt able to scoot self forward towards EOB w/ increased time. SBA sit > stand edge of bed w/ use of FWW and frequent cues and [place] assist to safely positioning BUE's on walker and bed. Once standing, pt ambulated to/from bathroom and completed toilet transfer w/ SBA. Pt then stood and ambulated to sink to complete 1 g/h task w/ SBA for safety and education on safe walker placement. Pt then returned to room and seated EOB. CGA to complete sit > sup w/ bed flat. Pt remained supine in bed w/ all needs met and bed alarm activated. RN present in room. \   OT Discharge Planning   OT Plan Monitor cog and screen as appropriate, sequencing ADL's, morning routine   OT " Discharge Recommendation (DC Rec) home with assist;home with home care occupational therapy   OT Rationale for DC Rec Pt is currently functioning below baseline d/t decreased activity tolerance and cognition. Pt pleasantly confused. Pt reports living w/ sister in house and being IND w/ all I/ADL's at baseline. Pt currently requiring assist x 1 for safety w/ all mobility and ADL's. If pt can have 24/7 supervision at home, then anticipate pt will be safe to return home w/ home therapies. If this assist can not be provided, then pt may benefit from a more supportive living environement such as jail/Memory Care.   OT Brief overview of current status Goals of therapy will be to address safe mobility and make recs for d/c to next level of care. Pt and RN will continue to follow all falls risk precautions as documented by RN staff while hospitalized.   OT Total Distance Amb During Session (feet) 50   Total Session Time   Timed Code Treatment Minutes 18   Total Session Time (sum of timed and untimed services) 28   [facility]                                                                                   OUTPATIENT OCCUPATIONAL THERAPY    PLAN OF TREATMENT FOR OUTPATIENT REHABILITATION   Patient's Last Name, First Name, Kathy Cordova YOB: 1935   Provider's Name   [facility]   Medical Record No.  [id number]     Onset Date: 02/05/25 Start of Care Date: 02/06/25     Medical Diagnosis:  Decreased ADL independence               OT Diagnosis:  Decreased ADL independence and activity tolerance Certification Dates:  From: 02/06/25  To: 02/13/25     See note for plan of treatment, functional goals, and certification details.    I CERTIFY THE NEED FOR THESE SERVICES FURNISHED UNDER        THIS PLAN OF TREATMENT AND WHILE UNDER MY CARE (Physician co-signature of this document indicates review and certification of the therapy plan).

## 2025-02-06 NOTE — PROGRESS NOTES
[facility]                                                                                   OUTPATIENT SPEECH LANGUAGE PATHOLOGY    PLAN OF TREATMENT FOR OUTPATIENT REHABILITATION   Patient's Last Name, First Name, Kathy Cordova YOB: 1935   Provider's Name   [facility]   Medical Record No.  [id number]     Onset Date: 02/05/25 Start of Care Date: (P) 02/06/25     Medical Diagnosis:  (P) dysphagia               SLP Diagnosis:  Moderate oral and pharyngeal dysphagia Certification Dates:  From: (P) 02/06/25  To: (P) 03/06/25     See note for plan of treatment, functional goals, and certification details.    I CERTIFY THE NEED FOR THESE SERVICES FURNISHED UNDER        THIS PLAN OF TREATMENT AND WHILE UNDER MY CARE (Physician co-signature of this document indicates review and certification of the therapy plan).                 02/06/25 3748   Appointment Info   Signing Clinician's Name / Credentials (SLP) Anusha Celaya MS CCC SLP   Quick Adds Certification   General Information   Onset of Illness/Injury or Date of Surgery 02/05/25   Referring Physician Nicole Tinoco MD   Patient/Family Therapy Goal Statement (SLP) Patient did not state   Pertinent History of Current Problem Kathy Braswell is a 89 year old female with history of hypertension, anxiety, depression, osteoporosis who presents to the emergency department with persistent nausea and vomiting.  Labs show hypokalemia and UTI.   General Observations Pleasant, confused with anxiety.   Type of Evaluation   Type of Evaluation Swallow Evaluation   Oral Motor   Oral Musculature unable to assess due to poor participation/comprehension   Structural Abnormalities none present   Mucosal Quality adequate   Dentition (Oral Motor)   Dentition (Oral Motor) natural dentition;some missing teeth   General Swallowing Observations   Past History of Dysphagia No documented history    Respiratory Support room air   Current Diet/Method of Nutritional Intake (General Swallowing Observations, NIS) regular diet;thin liquids (level 0)   Swallowing Evaluation Clinical swallow evaluation   Clinical Swallow Evaluation   Feeding Assistance frequent cues/help required   Clinical Swallow Evaluation Textures Trialed thin liquids;pureed;solid foods   Clinical Swallow Eval: Thin Liquid Texture Trial   Mode of Presentation, Thin Liquids cup;straw;self-fed   Volume of Liquid or Food Presented 4 swallows   Oral Phase of Swallow premature pharyngeal entry   Pharyngeal Phase of Swallow impaired;repeated swallows   Diagnostic Statement No immediate or delayed sx of aspiration.   Clinical Swallow Evaluation: Puree Solid Texture Trial   Mode of Presentation, Puree spoon;fed by clinician   Volume of Puree Presented 2 bites of pudding   Oral Phase, Puree delayed AP movement   Pharyngeal Phase, Puree impaired;repeated swallows   Diagnostic Statement No sx of aspiration   Clinical Swallow Evaluation: Solid Food Texture Trial   Mode of Presentation self-fed   Volume Presented 1/2 wilberto cracker   Oral Phase impaired mastication;delayed AP movement;residue in oral cavity;premature pharyngeal entry   Pharyngeal Phase impaired;coughing/choking   Diagnostic Statement Mastication prolonged with reduced bolus control and loss of the bolus with coughing choking episode.   Esophageal Phase of Swallow   Patient reports or presents with symptoms of esophageal dysphagia Yes   Esophageal comments Burping/belching question reflux as well or stricture can not be ruled out.   Swallowing Recommendations   Diet Consistency Recommendations full liquid diet;thin liquids (level 0)   Supervision Level for Intake 1:1 supervision needed   Mode of Delivery Recommendations bolus size, small;slow rate of intake   Postural Recommendations none   Swallowing Maneuver Recommendations alternate food and liquid intake   Monitoring/Assistance Required  (Eating/Swallowing) stop eating activities when fatigue is present;monitor for cough or change in vocal quality with intake   Recommended Feeding/Eating Techniques (Swallow Eval) maintain upright sitting position for eating;maintain upright posture during/after eating for 30 minutes;minimize distractions during oral intake;provide assist with feeding;set-up and prepare tray   Medication Administration Recommendations, Swallowing (SLP) Crushed in apple sauce   Instrumental Assessment Recommendations VFSS (videofluoroscopic swallowing study)   Comment, Swallowing Recommendations To fully assess pharyngeal function.   General Therapy Interventions   Planned Therapy Interventions Dysphagia Treatment   Dysphagia treatment Modified diet education;Instruction of safe swallow strategies   Clinical Impression   Criteria for Skilled Therapeutic Interventions Met (SLP Eval) Yes, treatment indicated   SLP Diagnosis Moderate oral and pharyngeal dysphagia   Risks & Benefits of therapy have been explained evaluation/treatment results reviewed;care plan/treatment goals reviewed;risks/benefits reviewed;current/potential barriers reviewed;participants voiced agreement with care plan;participants included;patient   Clinical Impression Comments Patient presents with moderate oral and pharyngeal dysphagia at bedside. Patient with limited food acceptance. She demonstrated premature entry with thin liquids but no overt sx of aspiration. Mastication was mildly prolonged with loss of the bolus and started to cough/choke and but able to speak. Question some esophageal dysfunction as well. Recommend: 1. downgrade diet to a full liquid diet and thin. 1:1 assistance smalll sips/bites, slow pace and alternate liquids/solids as needed. Hold diet if not tolerating. 2. Will complete a video swallow study 2/7/25 to fully assess pharyngeal and determine aspiration risk.   SLP Total Evaluation Time   Eval: oral/pharyngeal swallow function, clinical  swallow Minutes (12955) 20   Therapy Certification   Start of Care Date 02/06/25   Certification date from 02/06/25   Certification date to 03/06/25   Medical Diagnosis dysphagia   SLP Goals   Therapy Frequency (SLP Eval) 4 times/week   SLP Predicted Duration/Target Date for Goal Attainment 02/20/25   SLP Goals Swallow   SLP: Safely tolerate diet without signs/symptoms of aspiration Soft & bite sized diet;Thin liquids;With use of swallow precautions;With assistance/supervision   SLP Discharge Planning   SLP Plan Video   SLP Discharge Recommendation home with home health   SLP Rationale for DC Rec Patient's swallow function is below her baseline.   SLP Brief overview of current status  Moderate dysphagia and suspect esophageal component.

## 2025-02-06 NOTE — PROGRESS NOTES
RECEIVING UNIT ED HANDOFF REVIEW    ED Nurse Handoff Report was reviewed by: Yulissa Marks RN on February 5, 2025 at 10:18 PM

## 2025-02-06 NOTE — PROGRESS NOTES
Date/Time: 2/6/25 0700 - 1500    Patient vital signs are at baseline: Yes  Patient able to ambulate as they were prior to admission or with assist devices provided by therapies during their stay:  Yes  Patient MUST void prior to discharge:  Yes  Patient able to tolerate oral intake:  Yes  Pain has adequate pain control using Oral analgesics:  Yes  Does patient have an identified :  Yes  Has goal D/C date and time been discussed with patient:  Yes     Mental Status: Alert to self  Activity/dangle: Ast of 1 GB/W  Diet: Regular diet  Pain: Denies pain  [place]/Voiding: Voiding in the bathroom  Tele/Restraints/Iso: Tele - SR  02/LDA: Room air. IV saline locked  D/C Date: TBD  Other Info: Potassium protocol, recheck scheduled for 2/7/25.

## 2025-02-06 NOTE — PLAN OF CARE
Pt admitted from ED due to Nausea and Vomiting, Hypokalemia and UTI. Pt is alert to self, disoriented x3. Very Confused. Self transfer multiple time during the shift. Up with assist of 1 GB and walker. Elevated BP and HR noted during the shift. PRN Hydralazine given. Bladder scanned 479 ml straight cath 600 ml. Regular diet. PIV SL.

## 2025-02-06 NOTE — PROGRESS NOTES
MD Notification    Notified Person: MD    Notified Person Name: Yoni Condon    Notification Date/Time: 2/6/2025 0631    Notification Interaction: [employer]    Purpose of Notification:  BP at 194/109, recheck 184/104 and 180/92. HR at 110 and 109.       Orders Received: PRN Hydralazine order

## 2025-02-06 NOTE — PROGRESS NOTES
02/06/25 1200   Appointment Info   Signing Clinician's Name / Credentials (PT) Krunal Gleason DPT   Quick Adds   Quick Adds Certification       Present no   Living Environment   People in Home sibling(s)  (Sister)   Current Living Arrangements house   Home Accessibility stairs to enter home;stairs within home   Number of Stairs, Main Entrance 3   Stair Railings, Main Entrance railings safe and in good condition   Number of Stairs, Within Home, Primary eight   Transportation Anticipated family or friend will provide   Living Environment Comments Pt is a poor historian, follow-up will be needed to determine accuracy of responses. Pt reports living in a house with her sister. Stairs to enter and within the home. Pt reports her sister will pick her up upon discharge and provide assist.   Self-Care   Usual Activity Tolerance moderate   Current Activity Tolerance fair   Equipment Currently Used at Home walker, rolling   Fall history within last six months no   Activity/Exercise/Self-Care Comment Pt reports being IND at baseline with all ADLs but her sister will help her intermittently if needed. Pt reports using a FWW. Pt does not drive.   General Information   Onset of Illness/Injury or Date of Surgery 02/06/25   Referring Physician Nicole Tinoco MD   Patient/Family Therapy Goals Statement (PT) Pt did not state   Pertinent History of Current Problem (include personal factors and/or comorbidities that impact the POC) Per Chart: Kathy Braswell is a 89 year old female with history of hypertension, anxiety, depression, osteoporosis who presents to the emergency department with persistent nausea and vomiting.  Labs show hypokalemia and UTI.   Existing Precautions/Restrictions fall   Weight-Bearing Status - LLE full weight-bearing   Weight-Bearing Status - RLE full weight-bearing   Cognition   Orientation Status (Cognition) disoriented to;place;time;situation   Cognitive Status Comments Pt alert  to self only, distractible, able to follow commands   Pain Assessment   Patient Currently in Pain No   Integumentary/Edema   Integumentary/Edema no deficits were identifed   Posture    Posture Forward head position;Protracted shoulders   Range of Motion (ROM)   Range of Motion ROM is WFL   Strength (Manual Muscle Testing)   Strength (Manual Muscle Testing) Able to perform L SLR;Able to perform R SLR   Bed Mobility   Comment, (Bed Mobility) Supine>sit w/ SBA   Transfers   Comment, (Transfers) Sit>stand w/ FWW and SBA   Gait/Stairs (Locomotion)   [place] Level (Gait) supervision   Assistive Device (Gait) walker, front-wheeled   Distance in Feet (Gait) 5'   Balance   Balance Comments Adequate static sitting balance; pt ambulates w/ a FWW   Sensory Examination   Sensory Perception patient reports no sensory changes   Clinical Impression   Criteria for Skilled Therapeutic Intervention Yes, treatment indicated   PT Diagnosis (PT) Impaired gait   Influenced by the following impairments Decreased activity tolerance; Impaired cognition   Functional limitations due to impairments Impaired functional mobility   Clinical Presentation (PT Evaluation Complexity) stable   Clinical Presentation Rationale Clinical judgement   Clinical Decision Making (Complexity) low complexity   Planned Therapy Interventions (PT) balance training;bed mobility training;gait training;patient/family education;stair training;strengthening;transfer training;progressive activity/exercise   Risk & Benefits of therapy have been explained evaluation/treatment results reviewed;care plan/treatment goals reviewed;risks/benefits reviewed;current/potential barriers reviewed;participants voiced agreement with care plan;participants included;patient   PT Total Evaluation Time   PT Eval, Low Complexity Minutes (36141) 10   Therapy Certification   Start of care date 02/06/25   Certification date from 02/06/25   Certification date to 02/12/25   Medical Diagnosis  UTI   Physical Therapy Goals   PT Frequency Daily   PT Predicted Duration/Target Date for Goal Attainment 02/14/25   PT Goals Bed Mobility;Transfers;Gait;Stairs   PT: Bed Mobility Supervision/stand-by assist;Supine to/from sit;Goal Met   PT: Transfers Supervision/stand-by assist;Sit to/from stand;Assistive device;Goal Met   PT: Gait Supervision/stand-by assist;Assistive device;150 feet   PT: Stairs Supervision/stand-by assist;8 stairs;Rail on right   Interventions   Interventions Quick Adds Gait Training;Therapeutic Activity   Therapeutic Activity   Therapeutic Activities: dynamic activities to improve functional performance Minutes (20105) 13   Symptoms Noted During/After Treatment Fatigue   Treatment Detail/Skilled Intervention Greeted pt supine in bed, agreed to PT. VSS on RA throughout session. Pt alert to self only, able to follow commands but is easily distractible throughout session. Also, pt reports feeling anxious with OOB activity, improves with breathing exercises. Pt performed supine>sit w/ SBA. Once in sitting, pt able to scoot self to EOB and sit unsupported without LOB. Pt performed sit>stand x 8 w/ FWW and SBA, cues for hand placement. After ambulation, pt returned to supine in bed w/ SBA, demonstrating ability to safely lift BLEs back into bed and reposition self. Pt ended session supine in bed, with all needs met and CNA present.   Gait Training   Gait Training Minutes (44736) 15   Symptoms Noted During/After Treatment (Gait Training) fatigue   Treatment Detail/Skilled Intervention Pt ambulated w/ FWW and SBA. Pt ambulated with decreased gait speed, downward gaze, and shuffled gait. Verbal cues for upright gaze and posture, to increase step length, and pacing. Repeated cues throughout as pt often reverts back to original gait pattern. Pt easily distractible throughout session, often stopping during ambulation to ask about objects in the room. Pt appears to be able to ambulate further but declines,  wanting to go back to bed. No LOB noted.   Distance in Feet 40'   PT Discharge Planning   PT Plan Progress gait w/ FWW; trial stairs as able   PT Discharge Recommendation (DC Rec) home with home care physical therapy   PT Rationale for DC Rec Pt is below baseline but is moving well. Pt currently performing all functional mobility w/ FWW and SBA. Anticipate at time of discharge pt will be able to safely return home at discharge. Pt would benefit from continued skilled PT services via HHPT to address deficits in activity tolernance, balance and improve IND with safety and functional mobility. Recommend pt have 24/7 supervision due to impaired cognition.   PT Brief overview of current status Goals of therapy will be to address safe mobility and make recs for d/c to next level of care. Pt and RN will continue to follow all falls risk precautions as documented by RN staff while hospitalized. SBA w/ FWW   PT Total Distance Amb During Session (feet) 40   Physical Therapy Time and Intention   Timed Code Treatment Minutes 28   Total Session Time (sum of timed and untimed services) 38                                                                                     [facility]      OUTPATIENT PHYSICAL THERAPY EVALUATION  PLAN OF TREATMENT FOR OUTPATIENT REHABILITATION  (COMPLETE FOR INITIAL CLAIMS ONLY)  Patient's Last Name, First Name, M.I.  YOB: 1935  MichaelleKathy  AMOR                        Provider's Name  [facility] Medical Record No.  [id number]                               Onset Date:  02/06/25   Start of Care Date:  02/06/25      Type:     _X_PT   ___OT   ___SLP Medical Diagnosis:  UTI                        PT Diagnosis:  Impaired gait   Visits from SOC:  1   _________________________________________________________________________________  Plan of Treatment/Functional Goals    Planned Interventions: balance training, bed mobility training,  gait training, patient/family education, stair training, strengthening, transfer training, progressive activity/exercise     Goals: See Physical Therapy Goals on Care Plan in Epic electronic health record.    Therapy Frequency: Daily  Predicted Duration of Therapy Intervention: 02/14/25  _________________________________________________________________________________    I CERTIFY THE NEED FOR THESE SERVICES FURNISHED UNDER        THIS PLAN OF TREATMENT AND WHILE UNDER MY CARE     (Physician attestation of this document indicates review and certification of the therapy plan).              Certification date from: 02/06/25, Certification date to: 02/12/25    Referring Physician: Nicole Tinoco MD            Initial Assessment        See Physical Therapy evaluation dated 02/06/25 in Epic electronic health record.

## 2025-02-06 NOTE — PROGRESS NOTES
[facility]  ED Nurse Handoff Report    ED Chief complaint: Nausea & Vomiting and Altered Mental Status      ED Diagnosis:   Final diagnoses:   Acute cystitis without hematuria   Hypokalemia       Code Status: sees signed and held      Allergies:   Allergies   Allergen Reactions    Citalopram      Nausea      Duloxetine Hcl      dizziness    Lisinopril      cough    Metoprolol Succinate      toprol xl results in nausea, upper abd. pain    Morphine And Codeine      vomiting    Trazodone Hcl      Caused dizziness and nightmares    Valsartan      diovan results in nause , upper abd. pain       Patient Story: Patient BIBA from home. Family called 911 for chronic vomiting. Patient was evaluated by EMS yesterday but refused transport to ED. Patient has dementia, agitated. Patient is managing her own medications currently. Patient had multiple pills of lorazepam, had 30 pills a few days ago and now only has 1 pill left. Unsure where medications are going.   Focused Assessment:  Pt very anxious, forgetful but A/O x 3. Easily reoriented. No SOB. Purewick in place. Straight cathed x 1 for bladder scan of 420 per DR verbal order. Up with suyapa costello and 2 people to bedside commode. BM in the BSC.     Treatments and/or interventions provided: labs, straight cath, potassium replacement, zofran, ativan, CT  Patient's response to treatments and/or interventions: improved    To be done/followed up on inpatient unit:  see signed and held    Does this patient have any cognitive concerns?: Short term memory loss    Activity level - Baseline/Home:  Walker  Activity Level - Current:    2 and suyapa costello    Patient's Preferred language: English   Needed?: No    Isolation: None  Infection: Not Applicable  Patient tested for COVID 19 prior to admission: NO  Bariatric?: No    Vital Signs:   Vitals:    02/05/25 1228 02/05/25 1400 02/05/25 1500 02/05/25 1700   BP: (!) 185/84 138/68 (!) 146/80 (!) 138/96   Pulse: 95  97 110 94   Resp: 18      Temp: 98.5  F (36.9  C)      TempSrc: Temporal      SpO2: 97% 96% 96%        Cardiac Rhythm:     Was the PSS-3 completed:   Yes  What interventions are required if any?               Family Comments: Son at the bedside  OBS brochure/video discussed/provided to patient/family: N/A                  For the majority of the shift this patient's behavior was Green.   Behavioral interventions performed were NA.    ED NURSE PHONE NUMBER: [phone]

## 2025-02-06 NOTE — CONSULTS
Care Management Initial Consult    General Information  Assessment completed with:  ,         Primary Care Provider verified and updated as needed:     Readmission within the last 30 days:           Advance Care Planning:            Communication Assessment  Patient's communication style: spoken language (English or Bilingual)             Cognitive  Cognitive/Neuro/Behavioral: .WDL except  Level of Consciousness: confused, alert  Arousal Level: opens eyes spontaneously  Orientation: disoriented to, place, time, situation  Mood/Behavior: calm, cooperative  Best Language: 0 - No aphasia  Speech: clear, spontaneous    Living Environment:   People in home:       Current living Arrangements:        Able to return to prior arrangements:         Family/Social Support:  Care provided by:    Provides care for:       Support system:            Description of Support System:           Current Resources:   Patient receiving home care services:          Community Resources:    Equipment currently used at home: walker, rolling  Supplies currently used at home:      Employment/Financial:  Employment Status:          Financial Concerns:             Does the patient's insurance plan have a 3 day qualifying hospital stay waiver?  No    Lifestyle & Psychosocial Needs:  Social Drivers of Health     Food Insecurity: Not on file   Depression: Not at risk (3/11/2024)    PHQ-2     PHQ-2 Score: 0   Housing Stability: Not on file   Tobacco Use: Low Risk  (3/11/2024)    Patient History     Smoking Tobacco Use: Never     Smokeless Tobacco Use: Never     Passive Exposure: Not on file   Financial Resource Strain: Not on file   Alcohol Use: Not on file   Transportation Needs: Not on file   Physical Activity: Inactive (3/11/2024)    Exercise Vital Sign     Days of Exercise per Week: 0 days     Minutes of Exercise per Session: 0 min   Interpersonal Safety: Not on file   Stress: No Stress Concern Present (3/11/2024)    [facility]  Occupational Health - Occupational Stress Questionnaire     Feeling of Stress : Only a little   Social Connections: Unknown (3/11/2024)    Social Connection and Isolation Panel [NHANES]     Frequency of Communication with Friends and Family: Not on file     Frequency of Social Gatherings with Friends and Family: More than three times a week     Attends Muslim Services: Not on file     Active Member of Clubs or Organizations: Not on file     Attends Club or Organization Meetings: Not on file     Marital Status: Not on file   Health Literacy: Not on file       Functional Status:  Prior to admission patient needed assistance:              Mental Health Status:          Chemical Dependency Status:                Values/Beliefs:  Spiritual, Cultural Beliefs, Muslim Practices, Values that affect care:                 Discussed  Partnership in Safe Discharge Planning  document with patient/family: No    Additional Information:  Writer met with patient in her room and introduced self and role.  Patient is very pleasantly confused and has a sitter in room.  Patient stated she lives with her sister and is okay for writer to call sisjorgito.    Writer called rachel Crum and she did not answer.  Writer called patients son Eric and LVM: needing PIEDRA and complete consult    Next Steps: contact sister or son for consult, plan home PT at discharge.    Lynda Hansen RN

## 2025-02-06 NOTE — PROGRESS NOTES
[facility]    Medicine Progress Note - Hospitalist Service    Date of Admission:  2/5/2025    Assessment & Plan   Kathy Braswell is a 88 yo F with HTN, anxiety disorder, depression, GERD, and IBS with several weeks of increased confusion and intermittent vomiting. Work-up notable for sodium 131, potassium 3.1, chloride 90, anion gap 18.  Renal function and hepatic panel within normal limits.  CBC within normal limits.  UA mildly abnormal but not clearly infected.  CT head negative for acute process.  CT abdomen pelvis with contrast also negative for acute process, no bowel obstruction.  EKG showed sinus tachycardia but otherwise negative.  Patient empirically started on 1 g ceftriaxone.  Admitted for further evaluation.    Vomiting  Etiology unclear.  No episodes in house.  Per son, may be related to episodes of anxiety.  No abdominal pain to raise concerns for gastritis or PUD. Hemoglobin stable arguing against a UGIB.  CT abdomen negative for bowel obstruction.  Exam negative for abdominal tenderness.  Query underlying esophageal disorder such as achalasia or a Zenker's diverticulum.   - SLP consult, appreciate assistance  - Antiemetics as needed  - Consider GI consult  - Doubt UTI but will finish 3 courses of ABx just in case (UCx pending)    Tachycardia  HTN  Asymptomatic, nonspecific. Etiology unclear.  Patient has known hypertension for which she is on an ACE inhibitor and amlodipine.  Despite this, heart rates into the 130s and were felt to be irregular. Exam notable for soft murmur.  Query runs of AFib/flutter?   - Unable to see tele strips on Smart Disclosure, will message RN to ensure it is accurately connected  - Add carvedilol 12.5 BID with hold parameters  - Echo   - Tele  - Continue PTA amlodipine and irbesartan    Anxiety, Worsening Dementia  Vulnerable adult  Patient has longstanding history of anxiety managed with Prozac and daily Ativan.  It appears that she was recently  started on BuSpar.  On exam, her mood and affect are rapidly transversing the spectrum of emotions from happiness to tearful to fear.  It is difficult to pin down what exactly she is feeling.  Moreover, she appears to have advanced dementia, something is not formally diagnosed but family notes has been progressive for several years.  Lives at home with her 90-year-old sister who helps care for patient but will be unable to do so much longer.  -Query delirium, specifically hypodelirium.  Uncertain which medications to add to assist with mood and possible hallucinations  - Psych consult  - Continue PTA prozac, buspar, and PRN ativan  - Uncertain if would benefit from change in selective serotonin reuptake inhibitor to celexa (to improve depression and appetite?) or if another med should be added for synergy.  - Son agrees that patient cannot return home.  Will likely need a memory care  - Discussed CODE STATUS with son and he agrees that his mom should be DNR/DNI.  CODE STATUS updated in chart.  - Social work consult to discuss discharge plan       Observation Goals: -diagnostic tests and consults completed and resulted, -tolerating oral intake to maintain hydration, Nurse to notify provider when observation goals have been met and patient is ready for discharge.    Diet: Full Liquid Diet Thin Liquids (level 0) or as per SLP    DVT Prophylaxis: Pneumatic Compression Devices  Vargas Catheter: Not present  Lines: None     Cardiac Monitoring: None  Code Status: No CPR- Do NOT Intubate      Clinically Significant Risk Factors Present on Admission        # Hypokalemia: Lowest K = 2.9 mmol/L in last 2 days, will replace as needed  # Hyponatremia: Lowest Na = 130 mmol/L in last 2 days, will monitor as appropriate  # Hypochloremia: Lowest Cl = 90 mmol/L in last 2 days, will monitor as appropriate          # Hypertension: Noted on problem list                      Social Drivers of Health    Physical Activity: Inactive  "(3/11/2024)    Exercise Vital Sign     Days of Exercise per Week: 0 days     Minutes of Exercise per Session: 0 min   Social Connections: Unknown (3/11/2024)    Social Connection and Isolation Panel [NHANES]     Frequency of Social Gatherings with Friends and Family: More than three times a week          Disposition Plan     Medically Ready for Discharge: Anticipated Tomorrow       The patient's care was discussed with the Attending Physician, Dr. Luke, Care Coordinator/, Patient, and Patient's Family.    Bing Gleason PA-C  Hospitalist Service  [facility]  Securely message with [employer] (more info), search \"Bing Gleason\"  ______________________________________________________________________    Interval History   No overnight events. Ongoing tachycardia. Able to eat some this AM but poor appetite. No vomiting. Bedside attendant in place as patient highly impulsive.     Physical Exam   Vital Signs: Temp: 97.6  F (36.4  C) Temp src: Oral BP: 129/63 Pulse: (!) 136 (Nurse notified)   Resp: 20 SpO2: 96 % O2 Device: None (Room air)    Weight: 128 lbs 15.51 oz    GENERAL:  Pleasant but tearful and obviously disoriented to all but herself. Cooperative, alert. NAD. Nontoxic. Rapid mood changes.   HEENT: Normocephalic, atraumatic.  Extra occular mm intact.  Sclera clear. PER.  Mucous membranes moist.  Poor dentition  PULMONOLOGY: Clear to auscultation bilaterally    CARDIAC: irreg irreg on exam with soft murmur  ABDOMEN: Soft, nontender, + bowel tones  MUSCULOSKELETAL:  Moving x 4 spontaneously with CMS intact x4.  Normal bulk and tone. No obvious LE edema. No gross impairments   NEURO: Alert and oriented to self only. Labile mood. Very repetitive and extremely poor memory.  CN II-XII grossly intact and symmetric.  No ataxia when working with therapies but has a hunched, unsteady gait requiring frequent queuing. High fall risk. Shuffles.       Medical Decision Making       75 " MINUTES SPENT BY ME on the date of service doing chart review, history, exam, documentation & further activities per the note.      Data     I have personally reviewed the following data over the past 24 hrs:    9.1  \   15.5   / 276     135 95 (L) 10.3 /  98   3.3 (L) 22 0.57 \     ALT: 11 AST: 24 AP: 63 TBILI: 0.9   ALB: 4.1 TOT PROTEIN: 6.8 LIPASE: 33       Imaging results reviewed over the past 24 hrs:   Recent Results (from the past 24 hours)   CT Head w/o Contrast    Narrative    EXAM: CT HEAD W/O CONTRAST  LOCATION: [facility]  DATE: 2/5/2025    INDICATION: Headache, vomiting.  COMPARISON: None.  TECHNIQUE: Routine CT Head without IV contrast. Multiplanar reformats. Dose reduction techniques were used.    FINDINGS:  INTRACRANIAL CONTENTS: No intracranial hemorrhage, extraaxial collection, or mass effect. No CT evidence of acute infarct. Moderate presumed chronic small vessel ischemic changes. Mild to moderate generalized volume loss. No hydrocephalus. Corpus   callosum is normal. Cerebellar tonsillar position is satisfactory. No sella or suprasellar mass/hemorrhage. Vascular calcification.     VISUALIZED ORBITS/SINUSES/MASTOIDS: Prior bilateral cataract surgery. Visualized portions of the orbits are otherwise unremarkable. No paranasal sinus mucosal disease. No middle ear or mastoid effusion.    BONES/SOFT TISSUES: No scalp hematoma. No skull fracture. Degenerative changes both TMJs. Demineralization of the skull base. Nasopharynx is patent.      Impression    IMPRESSION:  1.  No CT evidence for acute intracranial process.    2.  Brain atrophy and presumed chronic microvascular ischemic changes as above.   CT Abdomen Pelvis w Contrast    Narrative    EXAM: CT ABDOMEN PELVIS W CONTRAST  LOCATION: [facility]  DATE: 2/5/2025    INDICATION: Vomiting, new, persistent.  COMPARISON: 12/17/2014  TECHNIQUE: CT scan of the abdomen and pelvis was performed following  injection of IV contrast. Multiplanar reformats were obtained. Dose reduction techniques were used.  CONTRAST: 67mL Isovue 370.    FINDINGS:   LOWER CHEST: Minimal scattered atelectasis and/or fibrosis. Tiny hiatal hernia.    HEPATOBILIARY: Normal contour with no significant mass. No bile duct dilatation. Calcified gallstones.    PANCREAS: No significant mass, duct dilatation, or inflammatory change.    SPLEEN: Normal size.    ADRENAL GLANDS: Mild thickening bilaterally.    KIDNEYS/BLADDER: No significant mass, stones, or hydronephrosis. There are simple or benign cysts. No follow up is needed. Low-attenuation subcentimeter renal lesion(s) compatible with benign cysts or other benign lesions. No routine follow-up is   recommended in a low-risk patient.    BOWEL: No obstruction or inflammatory change.    LYMPH NODES: No lymphadenopathy.    VASCULATURE: There are mild atherosclerotic changes of the visualized aorta and its branches. There is no evidence of aortic dissection or aneurysm.    PELVIC ORGANS: No pelvic masses.    MUSCULOSKELETAL: No frankly destructive bony lesions. Age-indeterminate compression deformities of L1 and L3. Posttraumatic deformities of the right superior and inferior pubic ramus.      Impression    IMPRESSION:   1.  Cholelithiasis without cholecystitis.  2.  No bowel obstruction.

## 2025-02-06 NOTE — PHARMACY-ADMISSION MEDICATION HISTORY
Pharmacist Admission Medication History    Admission medication history is complete. The information provided in this note is only as accurate as the sources available at the time of the update.    Information Source(s): Patient, Family member, and CareEverywhere/SureScripts via in-person and phone    Pertinent Information: spoke with patient only briefly.     Patient states sister helps ensure she takes medications day to day, and son helps  prescriptions. Called sister and unable to leave message, patient states sister is unable to read pill bottles due to poor vision. Called son Eric who did not have med list available to verify but says to go on most recent fill history. Son states he can bring in home supply of pills if patient is staying beyond 2/6 AM, H&P states patient may discharge at that point.    Patient overall admits to being poor historian but states taking meds last AM 2/4/25.     Changes made to PTA medication list:  Added: None  Deleted: None  Changed: None    Allergies reviewed with patient and updates made in EHR: no    Medication History Completed By: Winter Haider [facility] 2/5/2025 7:19 PM    PTA Med List   Medication Sig Note Last Dose/Taking    acetaminophen (TYLENOL) 325 MG tablet Take 1-2 tablets (325-650 mg) by mouth every 6 hours as needed for mild pain  Unknown    amLODIPine (NORVASC) 5 MG tablet Take 1 tablet (5 mg) by mouth daily 2/5/2025: Filled 11/22/24 #90ds #90 2/4/2025 Morning    busPIRone (BUSPAR) 15 MG tablet Take 1 tablet (15 mg) by mouth 2 times daily 2/5/2025: Filled 1/16/25 #90ds #180 2/4/2025 Morning    cholecalciferol (VITAMIN D) 1000 UNIT tablet Take 1 tablet by mouth daily.  2/4/2025    FLUoxetine (PROZAC) 20 MG capsule Take 1 capsule (20 mg) by mouth daily 2/5/2025: Filled 1/15/25 #90ds #90 2/4/2025 Morning    irbesartan (AVAPRO) 300 MG tablet TAKE 1 TABLET(300 MG) BY MOUTH DAILY 2/5/2025: Filled 12/13/24 #90ds #90 2/4/2025 Morning    LORazepam (ATIVAN) 0.5 MG  tablet Take 1 tablet (0.5 mg) by mouth daily as needed for anxiety. 2/5/2025: Filed 1/24/25 #30ds #30 Unknown

## 2025-02-07 ENCOUNTER — APPOINTMENT (OUTPATIENT)
Dept: OCCUPATIONAL THERAPY | Facility: CLINIC | Age: 89
End: 2025-02-07
Payer: COMMERCIAL

## 2025-02-07 ENCOUNTER — APPOINTMENT (OUTPATIENT)
Dept: GENERAL RADIOLOGY | Facility: CLINIC | Age: 89
End: 2025-02-07
Attending: INTERNAL MEDICINE
Payer: COMMERCIAL

## 2025-02-07 ENCOUNTER — APPOINTMENT (OUTPATIENT)
Dept: PHYSICAL THERAPY | Facility: CLINIC | Age: 89
End: 2025-02-07
Payer: COMMERCIAL

## 2025-02-07 ENCOUNTER — APPOINTMENT (OUTPATIENT)
Dept: CARDIOLOGY | Facility: CLINIC | Age: 89
DRG: 884 | End: 2025-02-07
Attending: PHYSICIAN ASSISTANT
Payer: COMMERCIAL

## 2025-02-07 ENCOUNTER — APPOINTMENT (OUTPATIENT)
Dept: SPEECH THERAPY | Facility: CLINIC | Age: 89
End: 2025-02-07
Payer: COMMERCIAL

## 2025-02-07 LAB
ATRIAL RATE - MUSE: 102 BPM
BACTERIA UR CULT: ABNORMAL
DIASTOLIC BLOOD PRESSURE - MUSE: NORMAL MMHG
INTERPRETATION ECG - MUSE: NORMAL
LVEF ECHO: NORMAL
P AXIS - MUSE: 68 DEGREES
POTASSIUM SERPL-SCNC: 3.3 MMOL/L (ref 3.4–5.3)
POTASSIUM SERPL-SCNC: 3.4 MMOL/L (ref 3.4–5.3)
PR INTERVAL - MUSE: 186 MS
QRS DURATION - MUSE: 80 MS
QT - MUSE: 364 MS
QTC - MUSE: 474 MS
R AXIS - MUSE: 60 DEGREES
SYSTOLIC BLOOD PRESSURE - MUSE: NORMAL MMHG
T AXIS - MUSE: 49 DEGREES
VENTRICULAR RATE- MUSE: 102 BPM

## 2025-02-07 PROCEDURE — 97530 THERAPEUTIC ACTIVITIES: CPT | Mod: GP

## 2025-02-07 PROCEDURE — 97535 SELF CARE MNGMENT TRAINING: CPT | Mod: GO

## 2025-02-07 PROCEDURE — 84132 ASSAY OF SERUM POTASSIUM: CPT | Performed by: PHYSICIAN ASSISTANT

## 2025-02-07 PROCEDURE — 93306 TTE W/DOPPLER COMPLETE: CPT | Mod: 26 | Performed by: INTERNAL MEDICINE

## 2025-02-07 PROCEDURE — 99222 1ST HOSP IP/OBS MODERATE 55: CPT

## 2025-02-07 PROCEDURE — 97116 GAIT TRAINING THERAPY: CPT | Mod: GP

## 2025-02-07 PROCEDURE — 250N000013 HC RX MED GY IP 250 OP 250 PS 637: Performed by: INTERNAL MEDICINE

## 2025-02-07 PROCEDURE — 120N000001 HC R&B MED SURG/OB

## 2025-02-07 PROCEDURE — 99232 SBSQ HOSP IP/OBS MODERATE 35: CPT | Performed by: PHYSICIAN ASSISTANT

## 2025-02-07 PROCEDURE — 92526 ORAL FUNCTION THERAPY: CPT | Mod: GN

## 2025-02-07 PROCEDURE — 92611 MOTION FLUOROSCOPY/SWALLOW: CPT | Mod: GN

## 2025-02-07 PROCEDURE — 250N000013 HC RX MED GY IP 250 OP 250 PS 637: Performed by: PHYSICIAN ASSISTANT

## 2025-02-07 PROCEDURE — 250N000013 HC RX MED GY IP 250 OP 250 PS 637

## 2025-02-07 PROCEDURE — 250N000011 HC RX IP 250 OP 636: Performed by: INTERNAL MEDICINE

## 2025-02-07 PROCEDURE — 999N000208 ECHOCARDIOGRAM COMPLETE

## 2025-02-07 PROCEDURE — 255N000002 HC RX 255 OP 636: Performed by: PHYSICIAN ASSISTANT

## 2025-02-07 PROCEDURE — 74230 X-RAY XM SWLNG FUNCJ C+: CPT

## 2025-02-07 PROCEDURE — 250N000009 HC RX 250: Performed by: INTERNAL MEDICINE

## 2025-02-07 PROCEDURE — C8929 TTE W OR WO FOL WCON,DOPPLER: HCPCS

## 2025-02-07 PROCEDURE — 36415 COLL VENOUS BLD VENIPUNCTURE: CPT | Performed by: PHYSICIAN ASSISTANT

## 2025-02-07 RX ORDER — POTASSIUM CHLORIDE 1.5 G/1.58G
40 POWDER, FOR SOLUTION ORAL ONCE
Status: COMPLETED | OUTPATIENT
Start: 2025-02-07 | End: 2025-02-07

## 2025-02-07 RX ORDER — BARIUM SULFATE 400 MG/ML
SUSPENSION ORAL ONCE
Status: DISCONTINUED | OUTPATIENT
Start: 2025-02-07 | End: 2025-02-07

## 2025-02-07 RX ORDER — LABETALOL HYDROCHLORIDE 5 MG/ML
5 INJECTION, SOLUTION INTRAVENOUS EVERY 4 HOURS PRN
Status: DISCONTINUED | OUTPATIENT
Start: 2025-02-07 | End: 2025-02-19 | Stop reason: HOSPADM

## 2025-02-07 RX ORDER — MIRTAZAPINE 15 MG/1
15 TABLET, FILM COATED ORAL AT BEDTIME
Status: DISCONTINUED | OUTPATIENT
Start: 2025-02-07 | End: 2025-02-19 | Stop reason: HOSPADM

## 2025-02-07 RX ADMIN — PERFLUTREN 10 ML: 6.52 INJECTION, SUSPENSION INTRAVENOUS at 15:22

## 2025-02-07 RX ADMIN — ONDANSETRON 4 MG: 2 INJECTION, SOLUTION INTRAMUSCULAR; INTRAVENOUS at 09:39

## 2025-02-07 RX ADMIN — BUSPIRONE HYDROCHLORIDE 15 MG: 15 TABLET ORAL at 08:25

## 2025-02-07 RX ADMIN — POTASSIUM CHLORIDE 40 MEQ: 1.5 POWDER, FOR SOLUTION ORAL at 09:27

## 2025-02-07 RX ADMIN — CARVEDILOL 6.25 MG: 3.12 TABLET, FILM COATED ORAL at 18:15

## 2025-02-07 RX ADMIN — PANTOPRAZOLE SODIUM 40 MG: 40 INJECTION, POWDER, FOR SOLUTION INTRAVENOUS at 08:26

## 2025-02-07 RX ADMIN — AMLODIPINE BESYLATE 5 MG: 5 TABLET ORAL at 08:26

## 2025-02-07 RX ADMIN — MIRTAZAPINE 15 MG: 15 TABLET, FILM COATED ORAL at 21:01

## 2025-02-07 RX ADMIN — FLUOXETINE HYDROCHLORIDE 20 MG: 20 CAPSULE ORAL at 08:25

## 2025-02-07 RX ADMIN — CEFTRIAXONE SODIUM 1 G: 1 INJECTION, POWDER, FOR SOLUTION INTRAMUSCULAR; INTRAVENOUS at 18:16

## 2025-02-07 RX ADMIN — IRBESARTAN 300 MG: 150 TABLET ORAL at 08:25

## 2025-02-07 RX ADMIN — POTASSIUM CHLORIDE 40 MEQ: 1.5 POWDER, FOR SOLUTION ORAL at 20:03

## 2025-02-07 RX ADMIN — CARVEDILOL 6.25 MG: 3.12 TABLET, FILM COATED ORAL at 08:25

## 2025-02-07 ASSESSMENT — ACTIVITIES OF DAILY LIVING (ADL)
ADLS_ACUITY_SCORE: 74
ADLS_ACUITY_SCORE: 70
DEPENDENT_IADLS:: CLEANING;COOKING;LAUNDRY;SHOPPING;MEAL PREPARATION;MEDICATION MANAGEMENT;MONEY MANAGEMENT;TRANSPORTATION
ADLS_ACUITY_SCORE: 74
ADLS_ACUITY_SCORE: 71
ADLS_ACUITY_SCORE: 70
ADLS_ACUITY_SCORE: 74
ADLS_ACUITY_SCORE: 74
ADLS_ACUITY_SCORE: 71
ADLS_ACUITY_SCORE: 74
ADLS_ACUITY_SCORE: 70
ADLS_ACUITY_SCORE: 71
ADLS_ACUITY_SCORE: 71

## 2025-02-07 NOTE — PROVIDER NOTIFICATION
MD Notification    Notified Person: MD    Notified Person Name: MARISA Alberto     Notification Date/Time: 1750 2/7/25    Notification Interaction: page    Purpose of Notification: Pt's hr is 116 but at times is in the 120s when sitting. Monitoring for hr sustaining over 120, could we have a prn IV or PO medication in case      Orders Received: prn hydralazine and labetalol ordered    Comments:

## 2025-02-07 NOTE — PROGRESS NOTES
[facility]    Medicine Progress Note - Hospitalist Service    Date of Admission:  2/5/2025    Assessment & Plan   Kathy Braswell is a 89 yo F with HTN, anxiety disorder, depression, GERD, and IBS with several weeks of increased confusion and intermittent vomiting. Work-up notable for sodium 131, potassium 3.1, chloride 90, anion gap 18.  Renal function and hepatic panel within normal limits.  CBC within normal limits.  UA mildly abnormal but not clearly infected.  CT head negative for acute process.  CT abdomen pelvis with contrast also negative for acute process, no bowel obstruction.  EKG showed sinus tachycardia but otherwise negative.  Patient empirically started on 1 g ceftriaxone.  Admitted for further evaluation.    Vomiting  Etiology unclear.  No episodes since admission but has had some nausea.  Per son, may be related to episodes of anxiety.  No abdominal pain to raise concerns for gastritis or PUD. Hemoglobin stable arguing against a UGIB.  CT abdomen negative for bowel obstruction.  Exam negative for abdominal tenderness.  Query underlying esophageal disorder such as achalasia or a Zenker's diverticulum.   - SLP consult, appreciate assistance. Swallow eval today  - Antiemetics as needed  - Consider GI consult  - Doubt UTI but will finish 3 courses of ABx just in case (UCx pending)    Tachycardia  HTN  Asymptomatic, nonspecific. Etiology unclear.  Patient has known hypertension for which she is on an ACE inhibitor and amlodipine.  Despite this, heart rates into the 130s and were felt to be irregular. Exam notable for soft murmur.  Query runs of AFib/flutter?   - Tele suggests possible sinus dysrhythmia with PACs; no obvious AFib/Flutter  - Carvedilol 12.5 BID added 2/6 with hold parameters and HR slightly better, down into 90s.   - Echo ordered, pending  - Tele  - Continue PTA amlodipine and irbesartan    Anxiety, Worsening Dementia  Vulnerable adult  Patient has longstanding  history of anxiety managed with Prozac and daily Ativan.  It appears that she was recently started on BuSpar.  On exam, her mood and affect are rapidly transversing the spectrum of emotions from happiness to tearful to fear.  It is difficult to pin down what exactly she is feeling.  Moreover, she appears to have advanced dementia, something is not formally diagnosed but family notes has been progressive for several years.  Lives at home with her 90-year-old sister who helps care for patient but will be unable to do so much longer.  -Query delirium, specifically hypodelirium.  Uncertain which medications to add to assist with mood and possible hallucinations  - Psych consult  - Continue PTA prozac, buspar, and PRN ativan   - Uncertain if would benefit from change in selective serotonin reuptake inhibitor to celexa (to improve depression and appetite?) or if another med should be added for synergy.  - Son agrees that patient cannot return home.  Will likely need a memory care  - Discussed CODE STATUS with son and he agrees that his mom should be DNR/DNI.  CODE STATUS updated in chart.  - Social work spoke with son today; following            Diet: Full Liquid Diet Thin Liquids (level 0) or as per SLP    DVT Prophylaxis: Pneumatic Compression Devices  Vargas Catheter: Not present  Lines: None     Cardiac Monitoring: ACTIVE order. Indication: Tachyarrhythmias, acute (48 hours)  Code Status: No CPR- Do NOT Intubate      Clinically Significant Risk Factors Present on Admission        # Hypokalemia: Lowest K = 2.9 mmol/L in last 2 days, will replace as needed  # Hyponatremia: Lowest Na = 130 mmol/L in last 2 days, will monitor as appropriate  # Hypochloremia: Lowest Cl = 90 mmol/L in last 2 days, will monitor as appropriate          # Hypertension: Noted on problem list                      Social Drivers of Health    Physical Activity: Inactive (3/11/2024)    Exercise Vital Sign     Days of Exercise per Week: 0 days      "Minutes of Exercise per Session: 0 min   Social Connections: Unknown (3/11/2024)    Social Connection and Isolation Panel [NHANES]     Frequency of Social Gatherings with Friends and Family: More than three times a week          Disposition Plan     Medically Ready for Discharge: Anticipated in 2-4 Days       The patient's care was discussed with the Attending Physician, Dr. Luke, Care Coordinator/, Patient, and Patient's Family.    Bing Gleason PA-C  Hospitalist Service  [facility]  Securely message with [employer] (more info), search \"Bing Keilanamell Victorino\"  ______________________________________________________________________    Interval History   No overnight events. Impulsive and requiring a 1-to-1. Very labile mood, quickly ranging from happy to tearful. Denies pain. Still quite confused.     Physical Exam   Vital Signs: Temp: 97.7  F (36.5  C) Temp src: Oral BP: (!) 168/91 Pulse: 101   Resp: 16 SpO2: 94 % O2 Device: None (Room air)    Weight: 128 lbs 15.51 oz    GENERAL:  Pleasant and alert but confused. Oriented to self only. Cooperative. Very labile mood - quickly vacillating between happy and tearful. NAD. Nontoxic.    HEENT: NCAT.  Extra occular mm grossly intact.  Sclera clear. PER.  Mucous membranes moist.  Poor dentition  PULMONOLOGY: Clear    CARDIAC: irreg irreg with soft murmur  ABDOMEN: Soft, nontender, nondistended  MUSCULOSKELETAL:  Moving x 4 spontaneously with CMS grossly intact x4.  Full power, no focal deficits or asymmetry  NEURO: Alert and oriented to self only. Continue to be very repetitive and extremely forgetful.  CN II-XII grossly intact and symmetric.  Shuffle gait. Requires frequent queuing. Moving independently in bed.       Medical Decision Making       75 MINUTES SPENT BY ME on the date of service doing chart review, history, exam, documentation & further activities per the note.      Data     I have personally reviewed the following data " over the past 24 hrs:    N/A  \   N/A   / N/A     N/A N/A N/A /  N/A   3.3 (L) N/A N/A \       Imaging results reviewed over the past 24 hrs:   No results found for this or any previous visit (from the past 24 hours).

## 2025-02-07 NOTE — CONSULTS
Initial Psychiatric Consult   Consult date: February 7, 2025         Reason for Consult, requesting source:    Suspected severe dementia with depression; query hypodelirium. On prozac, buspar (new). Uncertain what to change or add.  Requesting source: Nicole Tinoco    Labs and imaging reviewed. Patient seen and evaluated by SHWETA Bonner CNP          HPI:   Kathy Braswell is a 89 year old female with a history of hypertension and depression presenting with an altered mental status, nausea and vomiting. The patient's family report increasing intermittent nausea and vomiting over the past few weeks, which they attribute to anxiety. She has had similar symptoms in the past; family states last month she became anxious when she was unable to figure out her phone which resulted in an emesis episode. Family also notes, progressively worsening memory issues over the past 6 months. She uses a walker to ambulate but has been struggling with it lately with increase balance issues. She denies chest pain, abdominal pain, hematemesis, diarrhea, fevers, cough or rhinorrhea. She does not drink or smoke. Of note, her  passed away in 2012.     Psychiatry was consulted on 2/7/2025. The patient was awake and alert upon entering the room, though she was disoriented to place, time, and situation and was unable to recognize that she was in the hospital. The patient was pleasant and appeared aware of her confusion, reporting recent memory loss. She was alert to self, distractible, but able to follow commands. The patient endorsed a longstanding history of depression, stating that it has been a persistent issue. When asked about anxiety and the episodes of vomiting that led to her admission, she confirmed these have been significant problems for her. The patient reported good sleep overall but endorsed poor appetite. Pt endorsed feelings of wishing she was dead. She was noted to be a poor historian, unable to note what  makes her anxious.         Past Psychiatric History:   History of anxiety, depression managed by her PCP (Dr. Ospina). Has been on combination of prozac, buspar, ativan for 10+ years. Max dose of prozac looks to be 40mg in 2015 but looks like concerns for tolerability         Substance Use and History:   No substance use history per chart review        Past Medical History:   PAST MEDICAL HISTORY:   Past Medical History:   Diagnosis Date    Allergic rhinitis, cause unspecified     Closed compression fracture of L1 lumbar vertebral body 05/01/2015    after a fall    Esophageal reflux     Essential hypertension, benign     Generalized osteoarthrosis, unspecified site     HX COLON POLYP     Insomnia, unspecified     Irritable bowel syndrome 02/28/2015    Labyrinthitis, unspecified     Mild major depression     Nasal/sinus dis NEC     OSTEOPENIA     Pubic ramus fracture (H) 07/01/2014    Nondisplaced right superior and inferior pubic ramus fractures    Unspecified closed fracture of ankle        PAST SURGICAL HISTORY:   Past Surgical History:   Procedure Laterality Date    PHACOEMULSIFICATION CLEAR CORNEA WITH STANDARD INTRAOCULAR LENS IMPLANT  5/15/2013    Procedure: PHACOEMULSIFICATION CLEAR CORNEA WITH STANDARD INTRAOCULAR LENS IMPLANT;  RIGHT PHACOEMULSIFICATION CLEAR CORNEA WITH STANDARD INTRAOCULAR LENS IMPLANT  ;  Surgeon: Soy Hall MD;  Location: [facility]    PHACOEMULSIFICATION CLEAR CORNEA WITH STANDARD INTRAOCULAR LENS IMPLANT  10/30/2013    Procedure: PHACOEMULSIFICATION CLEAR CORNEA WITH STANDARD INTRAOCULAR LENS IMPLANT;  LEFT PHACOEMULSIFICATION CLEAR CORNEA WITH STANDARD INTRAOCULAR LENS IMPLANT;  Surgeon: Soy Hall MD;  Location: [facility]    ZZC NONSPECIFIC PROCEDURE      excision of cyst     ZZC NONSPECIFIC PROCEDURE      D&C x 2 after miscarriage and menometrorrhagia    ZZC NONSPECIFIC PROCEDURE  2/99    colonoscopy             Family History:   FAMILY HISTORY:   Family History   Problem Relation  Age of Onset    Hypertension Mother     Prostate Cancer Father        Family Psychiatric History: unknown         Social History:   SOCIAL HISTORY:   Social History     Tobacco Use    Smoking status: Never    Smokeless tobacco: Never   Substance Use Topics    Alcohol use: Yes     Alcohol/week: 0.0 standard drinks of alcohol     Comment: seldom       Lives at home with 90 year old sister.         Physical ROS:   The 10 point Review of Systems is negative other than noted in the HPI or here.           Medications:     Current Facility-Administered Medications   Medication Dose Route Frequency Provider Last Rate Last Admin    amLODIPine (NORVASC) tablet 5 mg  5 mg Oral Daily Bing Gleason PA-C   5 mg at 02/07/25 0826    busPIRone (BUSPAR) tablet 15 mg  15 mg Oral BID Bing Gleason PA-C   15 mg at 02/07/25 0825    carvedilol (COREG) tablet 6.25 mg  6.25 mg Oral BID w/meals Bing Gleason PA-C   6.25 mg at 02/07/25 0825    cefTRIAXone (ROCEPHIN) 1 g vial to attach to  mL bag for ADULTS or NS 50 mL bag for PEDS  1 g Intravenous Q24H Nicole Tinoco MD   1 g at 02/06/25 1746    FLUoxetine (PROzac) capsule 20 mg  20 mg Oral Daily Bing Gleason PA-C   20 mg at 02/07/25 0825    irbesartan (AVAPRO) tablet 300 mg  300 mg Oral Daily Bing Gleason PA-C   300 mg at 02/07/25 0825    pantoprazole (PROTONIX) IV push injection 40 mg  40 mg Intravenous Daily with breakfast Nicole Tinoco MD   40 mg at 02/07/25 0826    sodium chloride (PF) 0.9% PF flush 3 mL  3 mL Intracatheter Q8H Nicole Tinoco MD   3 mL at 02/07/25 0513              Allergies:     Allergies   Allergen Reactions    Citalopram      Nausea      Duloxetine Hcl      dizziness    Lisinopril      cough    Metoprolol Succinate      toprol xl results in nausea, upper abd. pain    Morphine And Codeine      vomiting    Trazodone Hcl      Caused dizziness and nightmares    Valsartan      diovan results in nause , upper  abd. pain          Labs:     Recent Results (from the past 48 hours)   Comprehensive metabolic panel    Collection Time: 02/05/25  1:08 PM   Result Value Ref Range    Sodium 131 (L) 135 - 145 mmol/L    Potassium 3.1 (L) 3.4 - 5.3 mmol/L    Carbon Dioxide (CO2) 23 22 - 29 mmol/L    Anion Gap 18 (H) 7 - 15 mmol/L    Urea Nitrogen 13.0 8.0 - 23.0 mg/dL    Creatinine 0.51 0.51 - 0.95 mg/dL    GFR Estimate 90 >60 mL/min/1.73m2    Calcium 9.1 8.8 - 10.4 mg/dL    Chloride 90 (L) 98 - 107 mmol/L    Glucose 105 (H) 70 - 99 mg/dL    Alkaline Phosphatase 63 40 - 150 U/L    AST 24 0 - 45 U/L    ALT 11 0 - 50 U/L    Protein Total 6.8 6.4 - 8.3 g/dL    Albumin 4.1 3.5 - 5.2 g/dL    Bilirubin Total 0.9 <=1.2 mg/dL   Lipase    Collection Time: 02/05/25  1:08 PM   Result Value Ref Range    Lipase 33 13 - 60 U/L   CBC with platelets and differential    Collection Time: 02/05/25  1:08 PM   Result Value Ref Range    WBC Count 9.3 4.0 - 11.0 10e3/uL    RBC Count 5.17 3.80 - 5.20 10e6/uL    Hemoglobin 15.2 11.7 - 15.7 g/dL    Hematocrit 42.6 35.0 - 47.0 %    MCV 82 78 - 100 fL    MCH 29.4 26.5 - 33.0 pg    MCHC 35.7 31.5 - 36.5 g/dL    RDW 12.5 10.0 - 15.0 %    Platelet Count 262 150 - 450 10e3/uL    % Neutrophils 76 %    % Lymphocytes 13 %    % Monocytes 11 %    % Eosinophils 0 %    % Basophils 0 %    % Immature Granulocytes 0 %    NRBCs per 100 WBC 0 <1 /100    Absolute Neutrophils 7.0 1.6 - 8.3 10e3/uL    Absolute Lymphocytes 1.2 0.8 - 5.3 10e3/uL    Absolute Monocytes 1.0 0.0 - 1.3 10e3/uL    Absolute Eosinophils 0.0 0.0 - 0.7 10e3/uL    Absolute Basophils 0.0 0.0 - 0.2 10e3/uL    Absolute Immature Granulocytes 0.0 <=0.4 10e3/uL    Absolute NRBCs 0.0 10e3/uL   iStat Gases Electrolytes Venous, POCT    Collection Time: 02/05/25  1:16 PM   Result Value Ref Range    CPB Applied Yes     Hematocrit POCT 45 35 - 47 %    Bicarbonate Venous POCT 25 21 - 28 mmol/L    Hemoglobin POCT 15.3 11.7 - 15.7 g/dL    Potassium POCT 2.9 (L) 3.4 - 5.3  mmol/L    Sodium POCT 130 (L) 135 - 145 mmol/L    pCO2 Venous POCT 30 (L) 40 - 50 mm Hg    pH Venous POCT 7.54 (H) 7.32 - 7.43    pO2 Venous POCT 56 (H) 25 - 47 mm Hg    O2 Sat, Venous POCT 92 (H) 70 - 75 %    Base Excess/Deficit (+/-) POCT 3.0 -3.0 - 3.0 mmol/L   UA with Microscopic reflex to Culture    Collection Time: 02/05/25  4:27 PM    Specimen: Urine, Catheter   Result Value Ref Range    Color Urine Yellow Colorless, Straw, Light Yellow, Yellow    Appearance Urine Clear Clear    Glucose Urine Negative Negative mg/dL    Bilirubin Urine Negative Negative    Ketones Urine 10 (A) Negative mg/dL    Specific Gravity Urine 1.010 1.003 - 1.035    Blood Urine Trace (A) Negative    pH Urine 6.0 5.0 - 7.0    Protein Albumin Urine 30 (A) Negative mg/dL    Urobilinogen Urine Normal Normal, 2.0 mg/dL    Nitrite Urine Negative Negative    Leukocyte Esterase Urine Large (A) Negative    Mucus Urine Present (A) None Seen /LPF    RBC Urine 1 <=2 /HPF    WBC Urine 90 (H) <=5 /HPF    Squamous Epithelials Urine <1 <=1 /HPF   Urine Culture    Collection Time: 02/05/25  4:27 PM    Specimen: Urine, Catheter   Result Value Ref Range    Culture 50,000-100,000 CFU/mL Streptococcus anginosus (A)    EKG 12-lead, tracing only    Collection Time: 02/05/25  6:19 PM   Result Value Ref Range    Systolic Blood Pressure  mmHg    Diastolic Blood Pressure  mmHg    Ventricular Rate 100 BPM    Atrial Rate 100 BPM    AK Interval 186 ms    QRS Duration 70 ms     ms    QTc 438 ms    P Axis 52 degrees    R AXIS 48 degrees    T Axis 15 degrees    Interpretation ECG       Sinus rhythm  Nonspecific ST abnormality  Abnormal ECG  When compared with ECG of 10-Feb-2015 11:52,  No significant change was found  Confirmed by GENERATED REPORT, COMPUTER ([id number]),  HERBER TANG ([id number]) on 2/5/2025 6:39:14 PM     Basic metabolic panel    Collection Time: 02/06/25  4:13 AM   Result Value Ref Range    Sodium 135 135 - 145 mmol/L    Potassium 3.3 (L) 3.4 - 5.3  mmol/L    Chloride 95 (L) 98 - 107 mmol/L    Carbon Dioxide (CO2) 22 22 - 29 mmol/L    Anion Gap 18 (H) 7 - 15 mmol/L    Urea Nitrogen 10.3 8.0 - 23.0 mg/dL    Creatinine 0.57 0.51 - 0.95 mg/dL    GFR Estimate 87 >60 mL/min/1.73m2    Calcium 9.1 8.8 - 10.4 mg/dL    Glucose 98 70 - 99 mg/dL   CBC with platelets    Collection Time: 02/06/25  4:13 AM   Result Value Ref Range    WBC Count 9.1 4.0 - 11.0 10e3/uL    RBC Count 5.23 (H) 3.80 - 5.20 10e6/uL    Hemoglobin 15.5 11.7 - 15.7 g/dL    Hematocrit 43.7 35.0 - 47.0 %    MCV 84 78 - 100 fL    MCH 29.6 26.5 - 33.0 pg    MCHC 35.5 31.5 - 36.5 g/dL    RDW 12.7 10.0 - 15.0 %    Platelet Count 276 150 - 450 10e3/uL   Magnesium    Collection Time: 02/06/25  4:13 AM   Result Value Ref Range    Magnesium 2.0 1.7 - 2.3 mg/dL   TSH with free T4 reflex    Collection Time: 02/06/25  4:13 AM   Result Value Ref Range    TSH 4.03 0.30 - 4.20 uIU/mL   EKG 12-lead, tracing only    Collection Time: 02/06/25  8:46 AM   Result Value Ref Range    Systolic Blood Pressure  mmHg    Diastolic Blood Pressure  mmHg    Ventricular Rate 102 BPM    Atrial Rate 102 BPM    MN Interval 186 ms    QRS Duration 80 ms     ms    QTc 474 ms    P Axis 68 degrees    R AXIS 60 degrees    T Axis 49 degrees    Interpretation ECG       Sinus tachycardia  Possible Left atrial enlargement  Borderline ECG  When compared with ECG of 05-Feb-2025 18:19,  No significant change was found     Potassium    Collection Time: 02/06/25 11:31 AM   Result Value Ref Range    Potassium 3.5 3.4 - 5.3 mmol/L   Potassium    Collection Time: 02/07/25  6:54 AM   Result Value Ref Range    Potassium 3.3 (L) 3.4 - 5.3 mmol/L          Physical and Psychiatric Examination:     BP (!) 168/91   Pulse 101   Temp 97.7  F (36.5  C) (Oral)   Resp 16   Wt 58.5 kg (128 lb 15.5 oz)   SpO2 94%   BMI 26.95 kg/m    Weight is 128 lbs 15.51 oz  Body mass index is 26.95 kg/m .    Physical Exam:  I have reviewed the physical exam as documented  by by the medical team and agree with findings and assessment and have no additional findings to add at this time.    Mental Status Exam:    Appearance: awake, alert  Attitude:  cooperative  Eye Contact:  good  Mood:  good  Affect:  bright   Speech:  clear, coherent  Language: Fluent in english   Psychomotor Behavior:  no evidence of tardive dyskinesia, dystonia, or tics  Thought Process:  logical  Associations:  no loose associations  Thought Content:  no evidence of suicidal ideation or homicidal ideation and passive suicidal ideation present  Insight:  poor due to cognitive decline  Judgement:  poor due to cognitive decline  Oriented to:  self only   Attention Span and Concentration:  fair  Recent and Remote Memory:  poor  Fund of Knowledge: Appropriate   Gait and Station:                DSM-5 Diagnosis:   Probable Major Neurocognitive Impairment   JUANPABLO     Unspecified depressive disorder            Assessment:   Kathy is an 89-year-old female with a known history of depression and anxiety, which appear to be chronic and persistent. She was admitted due to altered mental status, nausea, and vomiting. The progressive cognitive decline and memory difficulties do suggest cognitive impairment - only oriented to self on exam today. Patient endorsed passive suicidal ideation intermittently, no safety risks identified today, not actively suicidal. Due to her poor historianship, it is essential to closely monitor her cognitive and emotional status during her hospital stay to assess for any further decline and ensure appropriate management of her psychiatric and medical conditions. I am opting to replace buspar with Remeron for treatment of her ongoing anxiety, this may also help any nausea given 5ht3 antagonism as well as aid in sleep, appetite. Given memory impairment, fall risk etc, recommend against ativan.           Summary of Recommendations:   Continue Prozac 20 mg  Start Remeron 15 mg at night  Discontinue Buspar,  do not recommend restarting Ativan   Disposition per primary team       Jeni Ratliff, PMHNP-BC  Consult/Liaison Psychiatry   [facility]

## 2025-02-07 NOTE — PROGRESS NOTES
Care Management Follow Up    Length of Stay (days): 1    Expected Discharge Date: 02/10/2025     Concerns to be Addressed: discharge planning     Patient plan of care discussed at interdisciplinary rounds: Yes    Anticipated Discharge Disposition: Transitional Care              Anticipated Discharge Services:    Anticipated Discharge DME:      Patient/family educated on Medicare website which has current facility and service quality ratings: yes  Education Provided on the Discharge Plan: Yes  Patient/Family in Agreement with the Plan: yes    Referrals Placed by CM/SW: Post Acute Facilities  Private pay costs discussed:  Discussed that memory care will be private pay and provided info to patients  son Eric.    Discussed  Partnership in Safe Discharge Planning  document with patient/family: No     Handoff Completed: No, handoff not indicated or clinically appropriate    Additional Information:  Writer met with patient and her son and DIL in her room.  We discussed patient may be able to go to TCU at discharge for physical rehab.  Provided the TCU list and choices were made and sent for TCUs.   Eric indicated his mom  cannot continue to live on her own in her house.   Her older sister has been  taking care of her most of the time, but has her own  home.  Sister older and weighs about 90 lbs and cannot physically keep caring for  Kathy.  Eric would like to find a memory care halfway for Kathy.  Writer provided  the Sr. Directory and  Sr. Linkage Line to Eric.  Eric agreed to start working on Memory care for when Kathy is done at TCU.  Referrals  sent via [facility]    Next Steps: accepting TCU    Lynda Hansen RN

## 2025-02-07 NOTE — PLAN OF CARE
Goal Outcome Evaluation:  Neuro- x1 forgetful sitter at bedside   Most Recent Vitals- .BP (!) 167/79   Pulse 96   Temp 98.4  F (36.9  C) (Oral)   Resp 18   Wt 58.5 kg (128 lb 15.5 oz)   SpO2 94%   BMI 26.95 kg/m    Coreg dose increased this evening for better blood pressure control   Resp- RA denies sob/candelaria   Activity- sba - 1A   Pain- denies   Drips- intermittent iv abx   GI/- due to void. Bladder scan at 1500 184 and 1900 354. Attempted to urinate with no results, no symptoms with retention. Continue to follow voiding protocol   Diet: Full Liquid Diet Thin Liquids (level 0)  pills crushed in apple sauce   Plan- continue iv abx, XR video swallow for dysphagia and Psychiatry consulted for tomorrow

## 2025-02-07 NOTE — PROGRESS NOTES
"Video Fluoroscopic Swallow Study (VFSS)     02/07/25 1040   Appointment Info   Signing Clinician's Name / Credentials (SLP) Josie Arora MS CCC-SLP   General Information   Onset of Illness/Injury or Date of Surgery 02/05/25   Referring Physician Nicole Tinoco MD   Patient/Family Therapy Goal Statement (SLP) To assess swallowing/swallow safely   Pertinent History of Current Problem   Per provider \"Kathy Braswell is a 88 yo F with HTN, anxiety disorder, depression, GERD, and IBS with several weeks of increased confusion and intermittent vomiting. Work-up notable for sodium 131, potassium 3.1, chloride 90, anion gap 18.  Renal function and hepatic panel within normal limits.  CBC within normal limits.  UA mildly abnormal but not clearly infected.  CT head negative for acute process.  CT abdomen pelvis with contrast also negative for acute process, no bowel obstruction.  EKG showed sinus tachycardia but otherwise negative.  Patient empirically started on 1 g ceftriaxone.  Admitted for further evaluation.\"     SLP clinically assessed pt 2/6: rec'd full liquids/thin liquids and VFSS for further assessment of oropharyngeal swallow mechanism.     General Observations Pt alert, upright in chair, very pleasant and cooperative.   Type of Evaluation   Type of Evaluation Swallow Evaluation   General Swallowing Observations   Swallowing Evaluation Videofluoroscopic swallow study (VFSS)   VFSS Evaluation   Radiologist Dr. Benedict   Views Taken left lateral   Physical Location of Procedure [facility], radiology dept, fluoroscopy suite   VFSS Textures Trialed thin liquids;pureed;solid foods   VFSS Eval: Thin Liquid Texture Trial   Mode of Presentation, Thin Liquid cup;straw;self-fed   Order of Presentation 1, 2, 3, 6   Preparatory Phase poor bolus control   Oral Phase, Thin Liquid premature pharyngeal entry   Bolus Location When Swallow Triggered pyriforms   Pharyngeal Phase, Thin Liquid impaired " hyolaryngeal excursion;impaired epiglottic movement;impaired tongue base retraction;impaired pharyngoesophageal segment opening   Rosenbek's Penetration Aspiration Scale: Thin Liquid Trial Results 2 - contrast enters airway, remains above the vocal cords, no residue remains (penetration)   Diagnostic Statement Flash before/during laryngeal penetration 2/2 reduced oral control, premature bolus spillage to the pyriform sinuses, reduced/delayed laryngeal closure. Penetration fully clears laryngeal vestibule upon swallow completion.   VFSS Evaluation: Puree Solid Texture Trial   Mode of Presentation, Puree spoon   Order of Presentation 4   Preparatory Phase WFL   Oral Phase, Puree WFL   Bolus Location When Swallow Triggered valleculae   Pharyngeal Phase, Puree WFL   Rosenbek's Penetration Aspiration Scale: Puree Food Trial Results 1 - no aspiration, contrast does not enter airway   VFSS Evaluation: Solid Food Texture Trial   Mode of Presentation, Solid spoon   Order of Presentation 5   Preparatory Phase WFL   Oral Phase, Solid WFL   Bolus Location When Swallow Triggered valleculae   Pharyngeal Phase, Solid WFL   Rosenbek's Penetration Aspiration Scale: Solid Food Trial Results 1 - no aspiration, contrast does not enter airway   Esophageal Phase of Swallow   Patient reports or presents with symptoms of esophageal dysphagia Yes   Esophageal comments prominence of cricopharyngeus, which does not impede bolus transit into upper esophagus; esophageal sweep sitting in lateral view with no retention   Swallowing Recommendations   Diet Consistency Recommendations regular diet;thin liquids (level 0)   Medication Administration Recommendations, Swallowing (SLP) per pt preference   General Therapy Interventions   Planned Therapy Interventions Dysphagia Treatment   Dysphagia treatment Instruction of safe swallow strategies   Clinical Impression   Criteria for Skilled Therapeutic Interventions Met (SLP Eval) Yes, treatment indicated    SLP Diagnosis minimal oropharyngeal dysphagia   Risks & Benefits of therapy have been explained evaluation/treatment results reviewed;care plan/treatment goals reviewed;risks/benefits reviewed;current/potential barriers reviewed;participants voiced agreement with care plan;participants included;patient;son   Clinical Impression Comments   Video fluoroscopic swallow study completed with thin liquids, puree solids, regular solids. Pt currently presents with minimal oropharyngeal dysphagia - overall WFL for regular/thin diet. Mastication mildly prolonged, but full oral clearnace with time. Mildly reduced oral control/premature bolus spillage to the pyriform sinuses with thin liquids; timely swallows at valleculae with solids. Hyolaryngeal elevation/excursion mildly reduced, epiglottic inversion initially incomplete (?dryness), though complete as trials progressed. Notable prominence of cricopharyngeus, but this did not impede bolus transit into upper esophagus. No oropharyngeal residue observed; full clearance through esophagus during sweep/no retention.     Flash before/during laryngeal penetration with thin liquids only 2/2 reduced oral control, premature bolus spillage to the pyriform sinuses, reduced/delayed laryngeal closure. Penetration fully clears laryngeal vestibule upon swallow completion- this is functional. No aspiration across study.     SLP Total Evaluation Time   Evaluation, videofluoroscopic eval of swallow function Minutes (44707) 15   SLP Goals   Therapy Frequency (SLP Eval) 3 times/week   SLP Predicted Duration/Target Date for Goal Attainment 02/14/25   SLP Goals Swallow   SLP: Safely tolerate diet without signs/symptoms of aspiration Regular diet;Thin liquids;With use of swallow precautions;Independently   Interventions   Interventions Quick Adds Swallowing Dysfunction   Swallowing Intervention   Treatment of Swallowing Dysfunction &/or Oral Function for Feeding Minutes (92570) 10   Symptoms Noted  During/After Treatment None   Treatment Detail/Skilled Intervention   Educated pt/family on results of VFSS in room following the study, including review of cine loop recordings, education re: general safe swallow preacutions. Pt clinically tolerated x3 small bites of regular wilberto cracker, IND with liquid wash given dryness of cracker. No overt clinical signs/sx aspiration noted.     SLP Discharge Planning   SLP Plan meal f/u x1-2   SLP Discharge Recommendation home;home with assist   SLP Rationale for DC Rec Pt swallow near baseline; don't anticipate post-acute SLP needs   SLP Brief overview of current status  VFSS completed: minimal deficits, WFL for regular/thin diet. Recommend upright positioning, slow rate, single bites/sips at a time, self-select softer/moister items as needed, liquid wash PRN.   SLP Time and Intention   Total Session Time (sum of timed and untimed services) 25

## 2025-02-07 NOTE — PLAN OF CARE
Goal Outcome Evaluation:      Date/Time: 2/6/2025 [id number]     Trauma/Ortho/Medical (Choose one) Medical     Diagnosis: Altered mental status. UTI. N/V  POD#: NA  Mental Status: Alert to self  Activity/dangle: 1 GB/walker  Diet: full liquid  Pain: Denied  Vargas/Voiding: BR  Tele/Restraints/Iso: Tele - Sinus Tachy  02/LDA: RA /PIV sl  D/C Date: TBD  Other Info:  K protocol. Sitter at bedside. Meds whole in applesauce.

## 2025-02-07 NOTE — PROGRESS NOTES
Orientations:  A & O to self.  Confused, restless/impulsive at times, sitter at bed side.   Vitals/Pain: stable on RA. Denies pain  Tele: SR  Lines/Drains: R PIV, SL.  Skin/Wounds: R hip surgical inc, dressing CDI. Redness to buttocks. scattered bruising.   GI/: active BS, no BM, passing flatus. Voided to the bathroom. Tolerating full liq diet.   Labs: Abnormal/Trends, Electrolyte Replacement- on K protocol.   Ambulation/Assist: Ax1 GBW.   Sleep Quality:   Plan: psych consult, video swallow with SLP.

## 2025-02-07 NOTE — PROVIDER NOTIFICATION
MD Notification    Notified Person: MD    Notified Person Name: MARISA Alberto    Notification Date/Time: 0955, 2/7/25    Notification Interaction: page    Purpose of Notification: FYI pt's BP is 175/114, gave scheduled BP medications and BP was 168/91. Pt was also nauseous this am, gave prn zofran x1    Orders Received:    Comments:

## 2025-02-08 LAB
POTASSIUM SERPL-SCNC: 3.8 MMOL/L (ref 3.4–5.3)
POTASSIUM SERPL-SCNC: 3.8 MMOL/L (ref 3.4–5.3)

## 2025-02-08 PROCEDURE — 84132 ASSAY OF SERUM POTASSIUM: CPT | Performed by: INTERNAL MEDICINE

## 2025-02-08 PROCEDURE — 36415 COLL VENOUS BLD VENIPUNCTURE: CPT | Performed by: INTERNAL MEDICINE

## 2025-02-08 PROCEDURE — 250N000009 HC RX 250: Performed by: INTERNAL MEDICINE

## 2025-02-08 PROCEDURE — 99232 SBSQ HOSP IP/OBS MODERATE 35: CPT | Performed by: HOSPITALIST

## 2025-02-08 PROCEDURE — 120N000001 HC R&B MED SURG/OB

## 2025-02-08 PROCEDURE — 250N000011 HC RX IP 250 OP 636: Performed by: INTERNAL MEDICINE

## 2025-02-08 PROCEDURE — 250N000013 HC RX MED GY IP 250 OP 250 PS 637

## 2025-02-08 PROCEDURE — 250N000013 HC RX MED GY IP 250 OP 250 PS 637: Performed by: PHYSICIAN ASSISTANT

## 2025-02-08 RX ADMIN — IRBESARTAN 300 MG: 150 TABLET ORAL at 08:23

## 2025-02-08 RX ADMIN — MIRTAZAPINE 15 MG: 15 TABLET, FILM COATED ORAL at 20:17

## 2025-02-08 RX ADMIN — CARVEDILOL 6.25 MG: 3.12 TABLET, FILM COATED ORAL at 08:23

## 2025-02-08 RX ADMIN — FLUOXETINE HYDROCHLORIDE 20 MG: 20 CAPSULE ORAL at 08:23

## 2025-02-08 RX ADMIN — AMLODIPINE BESYLATE 5 MG: 5 TABLET ORAL at 08:23

## 2025-02-08 RX ADMIN — CARVEDILOL 6.25 MG: 3.12 TABLET, FILM COATED ORAL at 17:30

## 2025-02-08 RX ADMIN — CEFTRIAXONE SODIUM 1 G: 1 INJECTION, POWDER, FOR SOLUTION INTRAMUSCULAR; INTRAVENOUS at 17:29

## 2025-02-08 RX ADMIN — PANTOPRAZOLE SODIUM 40 MG: 40 INJECTION, POWDER, FOR SOLUTION INTRAVENOUS at 08:23

## 2025-02-08 ASSESSMENT — ACTIVITIES OF DAILY LIVING (ADL)
ADLS_ACUITY_SCORE: 69
ADLS_ACUITY_SCORE: 74
ADLS_ACUITY_SCORE: 69
ADLS_ACUITY_SCORE: 69
ADLS_ACUITY_SCORE: 74
ADLS_ACUITY_SCORE: 69
ADLS_ACUITY_SCORE: 69
ADLS_ACUITY_SCORE: 74
ADLS_ACUITY_SCORE: 69
ADLS_ACUITY_SCORE: 74
ADLS_ACUITY_SCORE: 69
ADLS_ACUITY_SCORE: 74
ADLS_ACUITY_SCORE: 74
ADLS_ACUITY_SCORE: 69
ADLS_ACUITY_SCORE: 74
ADLS_ACUITY_SCORE: 74
ADLS_ACUITY_SCORE: 69
ADLS_ACUITY_SCORE: 74

## 2025-02-08 NOTE — PROGRESS NOTES
[facility]    Medicine Progress Note - Hospitalist Service    Date of Admission:  2/5/2025    Assessment & Plan   Kathy Braswell is a 89 yo F with HTN, anxiety disorder, depression, GERD, and IBS with several weeks of increased confusion and intermittent vomiting. Work-up notable for sodium 131, potassium 3.1, chloride 90, anion gap 18.  Renal function and hepatic panel within normal limits.  CBC within normal limits.  UA mildly abnormal but not clearly infected.  CT head negative for acute process.  CT abdomen pelvis with contrast also negative for acute process, no bowel obstruction.  EKG showed sinus tachycardia but otherwise negative.  Patient empirically started on 1 g ceftriaxone.  Admitted for further evaluation.    Vomiting  Etiology unclear.  No episodes since admission but has had some nausea.  Per son, may be related to episodes of anxiety.  No abdominal pain to raise concerns for gastritis or PUD. Hemoglobin stable arguing against a UGIB.  CT abdomen negative for bowel obstruction.  Exam negative for abdominal tenderness.  Query underlying esophageal disorder such as achalasia or a Zenker's diverticulum.   - SLP consult, appreciate assistance.  VFSS on 2/7 did not show any aspiration with any consistency.  Currently on regular diet, thin liquids with single sips.  SLP following.  - Antiemetics as needed  -Currently no significant nausea or vomiting in hospital.  - Doubt UTI playing significant role, urine culture with strep anginosus  K.  Complete 3-day course of antibiotic.    Intermittent sinus tachycardia  HTN  Asymptomatic, nonspecific. Etiology unclear.  Patient has known hypertension for which she is on an ACE inhibitor and amlodipine.  Noted heart rates intermittently into the 130s and were felt to be irregular. Exam notable for soft murmur.  Query runs of AFib/flutter?   - Tele suggests possible sinus dysrhythmia with PACs; no obvious AFib/Flutter  - Carvedilol 6.25  BID added 2/6 with hold parameters and HR slightly better, down into 90s.  Monitor for tolerance, discontinue if not needed.  - Echo 2/7: Normal EF 60-65%, normal LV, RV, valves.  - Tele  - Continue PTA amlodipine and irbesartan    Anxiety, Worsening Dementia  Vulnerable adult  Patient has longstanding history of anxiety managed with Prozac and daily Ativan.  It appears that she was recently started on BuSpar.  On exam, her mood and affect are rapidly transversing the spectrum of emotions from happiness to tearful to fear.  It is difficult to pin down what exactly she is feeling.  Moreover, she appears to have advanced dementia, not formally diagnosed but family notes has been progressive for several years.  Lives at home with her 90-year-old sister who helps care for patient but will be unable to do so much longer.  -Query delirium.  - Psych consult for medication adjustments  - Continue PTA prozac, started on Remeron 15 mg nightly, PTA Buspar discontinued.  - Son agrees that patient cannot return home.  Will likely need  memory care TCU.  - Discussed CODE STATUS with son and he agrees that his mom should be DNR/DNI.  CODE STATUS updated in chart.  - Social work following            Diet: Regular Diet Adult Thin Liquids (level 0) (upright, single sips, liquid wash PRN) or as per SLP    DVT Prophylaxis: Pneumatic Compression Devices  Vargas Catheter: Not present  Lines: None     Cardiac Monitoring: ACTIVE order. Indication: Tachyarrhythmias, acute (48 hours)  Code Status: No CPR- Do NOT Intubate      Clinically Significant Risk Factors        # Hypokalemia: Lowest K = 3.3 mmol/L in last 2 days, will replace as needed            # Hypertension: Noted on problem list                       Social Drivers of Health    Physical Activity: Inactive (3/11/2024)    Exercise Vital Sign     Days of Exercise per Week: 0 days     Minutes of Exercise per Session: 0 min   Social Connections: Unknown (3/11/2024)    Social Connection  "and Isolation Panel [NHANES]     Frequency of Social Gatherings with Friends and Family: More than three times a week          Disposition Plan     Medically Ready for Discharge: Anticipated in 2-4 Days, pending mental health stability, disposition arrangements       Carmen Glaser MD  Hospitalist Service  [facility]  Securely message with [employer] (more info), search \"Bign Gleason\"  ______________________________________________________________________    Interval History   No overnight events.  Patient was laying in bed, sitter at her side.  Patient tells me she is feeling very sad and wants to feel like herself/normal again.  Asks me what is wrong with her.  Tells me she is sad partly because no friends visit her.  We discussed awaiting psychiatry consult for medication adjustments.    Physical Exam   Vital Signs: Temp: 98  F (36.7  C) Temp src: Oral BP: (!) 158/80 Pulse: 76   Resp: 16 SpO2: 96 % O2 Device: None (Room air)    Weight: 128 lbs 15.51 oz    GENERAL: Appears depressed.  Alert, cooperative, answering simple questions  PULMONOLOGY: Clear    CARDIAC: RRR  ABDOMEN: Soft, nontender, nondistended  MUSCULOSKELETAL:  Moving x 4 spontaneously with CMS grossly intact x4.   NEURO: Alert and oriented to self only. Continue to be very repetitive and extremely forgetful.  CN II-XII grossly intact and symmetric.  Moving independently in bed.       Medical Decision Making       45 MINUTES SPENT BY ME on the date of service doing chart review, history, exam, documentation & further activities per the note.      Data     I have personally reviewed the following data over the past 24 hrs:    N/A  \   N/A   / N/A     N/A N/A N/A /  N/A   3.8 N/A N/A \       Imaging results reviewed over the past 24 hrs:   Recent Results (from the past 24 hours)   XR Video Swallow with SLP or OT    Narrative    EXAM: XR VIDEO SWALLOW WITH SLP OR OT  LOCATION: [facility]  DATE: " 2025    INDICATION: Difficulty swallowing.  COMPARISON: None.    TECHNIQUE: Routine swallow study with speech pathology using multiple barium thicknesses.    RADIATION DOSE: Total Air Kerma 2.4 mGy      Impression    IMPRESSION: Shallow intermittent thin liquid penetration. No aspiration with any consistency. Please see speech pathology note for further details.   Echocardiogram Complete   Result Value    LVEF  60-65%    Narrative    [id number]  [id number]  [id number]  [id number]^EFRAIN^ROOPA^ASHLEY     [facility]  Echocardiography Laboratory  [address] [place], [place] [address]     Name: RACHEL PATEL  MRN: [id number]  : 1935  Study Date: 2025 02:36 PM  Age: 89 yrs  Gender: Female  Patient Location: [facility]  Reason For Study: Tachycardia  Ordering Physician: ROOPA ZUNIGA  Performed By: Deann Montez     BSA: 1.5 m2  Height: 58 in  Weight: 128 lb  HR: 173  BP: 153/83 mmHg  ______________________________________________________________________________  Procedure  Echocardiogram with two-dimensional, color and spectral Doppler. Definity (NDC  #[id number]) given intravenously.  ______________________________________________________________________________  Interpretation Summary     Left ventricular systolic function is normal.  The visual ejection fraction is 60-65%.  Normal left ventricular wall motion  The right ventricle is normal in structure, function and size.  No significant valve dysfunction.  The inferior vena cava was normal in size with preserved respiratory  variability.  There is no pericardial effusion.     No prior study for comparison.  ______________________________________________________________________________  Left Ventricle  The left ventricle is normal in structure, function and size. There is normal  left ventricular wall thickness. Left ventricular systolic function is normal.  The visual ejection fraction is 60-65%. Grade I or early diastolic  dysfunction.  Normal left ventricular wall motion.     Right Ventricle  The right ventricle is normal in structure, function and size.     Atria  Normal left atrial size. Right atrial size is normal.     Mitral Valve  The mitral valve is normal in structure and function.     Tricuspid Valve  The tricuspid valve is normal in structure and function. Right ventricular  systolic pressure could not be approximated due to inadequate tricuspid  regurgitation.     Aortic Valve  The aortic valve is trileaflet with aortic valve sclerosis. There is mild (1+)  aortic regurgitation.     Pulmonic Valve  The pulmonic valve is normal in structure and function.     Vessels  The aortic root is normal size. Normal size ascending aorta. The inferior vena  cava was normal in size with preserved respiratory variability.     Pericardium  There is no pericardial effusion.     ______________________________________________________________________________  MMode/2D Measurements & Calculations  IVSd: 1.0 cm  LVIDd: 3.9 cm  LVIDs: 2.1 cm  LVPWd: 1.0 cm  FS: 46.2 %     LV mass(C)d: 122.1 grams  LV mass(C)dI: 81.1 grams/m2  Ao root diam: 3.0 cm  LA dimension: 3.4 cm  asc Aorta Diam: 3.0 cm  LA/Ao: 1.2  LVOT diam: 2.0 cm  LVOT area: 3.2 cm2  Ao root diam index Ht(cm/m): 2.0  Ao root diam index BSA (cm/m2): 2.0  Asc Ao diam index BSA (cm/m2): 2.0  Asc Ao diam index Ht(cm/m): 2.0  LA Volume (BP): 24.0 ml  LA Volume Index (BP): 15.9 ml/m2     RV Base: 2.5 cm  RWT: 0.51  TAPSE: 2.3 cm     Doppler Measurements & Calculations  MV E max satnam: 68.8 cm/sec  MV A max satnam: 116.2 cm/sec  MV E/A: 0.59  MV dec slope: 348.8 cm/sec2  MV dec time: 0.20 sec  Ao V2 max: 172.5 cm/sec  Ao max P.0 mmHg  Ao V2 mean: 136.4 cm/sec  Ao mean P.0 mmHg  Ao V2 VTI: 39.9 cm  RIO(I,D): 1.8 cm2  RIO(V,D): 1.7 cm2  AI P1/2t: 642.3 msec  LV V1 max PG: 3.3 mmHg  LV V1 max: 90.3 cm/sec  LV V1 VTI: 21.9 cm  SV(LVOT): 69.8 ml  SI(LVOT): 46.3 ml/m2  PA acc time: 0.13 sec  AV Satnam Ratio (DI):  0.52  RIO Index (cm2/m2): 1.2  E/E' av.3  Lateral E/e': 8.5     Medial E/e': 10.0  RV S Satnam: 16.0 cm/sec     ______________________________________________________________________________  Report approved by: Marce Cochran MD on 2025 04:30 PM

## 2025-02-08 NOTE — PLAN OF CARE
Goal Outcome Evaluation:      Plan of Care Reviewed With: patient    Overall Patient Progress: improvingOverall Patient Progress: improving    Orientation/Cognitive: A/Ox1  Mobility Level/Assist Equipment: Ax1 GB and walker  Fall Risk (Y/N): yes  Behavior Concerns: forgetful  Pain Management: no pain reported  Tele/VS/O2: VSS on RA ex HTN and tachycardic at times, prn hydralazine and labetalol ordered  ABNL Lab/BG: K replacement protocols, replaced this shift and recheck at 2341  Diet: Reg diet, poor appetite  Bowel/Bladder: voiding in bathroom frequently   Skin Concerns: pale, scattered bruising otherwise intact  Drains/Devices: Left PIV SL, intermittent antibiotics   Tests/Procedures for next shift: PT/OT,, psych, SW following  Anticipated DC date & active delays: tbd

## 2025-02-08 NOTE — PROGRESS NOTES
2/7 1900-0730     Diagnosis:Nausea/vomiting; anxiety w/worsening dementia; UTI     Mental Status:A&Ox1, forgetful, worsening dementia  Activity/dangle:Assistx1 GB/W  Diet:Regular   Pain:Denied   Vargas/Voiding:BR, frequent urination   Tele/Restraints/Iso:Tele was NSR  02/LDA:EFRA  D/C Date:tbd, TCU or memory care family deciding  Other Info:Patient has sitter in the room. VSS, but can be tachycardic at times, has PRN hydralazine and labetalol ordered. K protocol, 3.8 at 8870

## 2025-02-08 NOTE — PLAN OF CARE
Date/Time: 2/8/25 0700 - 1900  Diagnosis: N/V/AMS/ UTI  Mental Status: A&O to self  Activity/dangle: Ast 1 GB/W  Diet: Regular diet  Pain: Denies pain  Vargas/Voiding: Voiding in the bathroom, retaining BS at 1030 for 514, straight cath at 1141 for 500. Last bladder scanned at 1427 for 5.  Tele/Restraints/Iso: Tele - SR  02/LDA: Room air. IV saline locked  D/C Date: TBD

## 2025-02-09 ENCOUNTER — APPOINTMENT (OUTPATIENT)
Dept: PHYSICAL THERAPY | Facility: CLINIC | Age: 89
End: 2025-02-09
Payer: COMMERCIAL

## 2025-02-09 PROCEDURE — 250N000013 HC RX MED GY IP 250 OP 250 PS 637: Performed by: PHYSICIAN ASSISTANT

## 2025-02-09 PROCEDURE — 250N000013 HC RX MED GY IP 250 OP 250 PS 637

## 2025-02-09 PROCEDURE — 120N000001 HC R&B MED SURG/OB

## 2025-02-09 PROCEDURE — 250N000009 HC RX 250: Performed by: INTERNAL MEDICINE

## 2025-02-09 PROCEDURE — 97116 GAIT TRAINING THERAPY: CPT | Mod: GP | Performed by: PHYSICAL THERAPY ASSISTANT

## 2025-02-09 PROCEDURE — 97530 THERAPEUTIC ACTIVITIES: CPT | Mod: GP | Performed by: PHYSICAL THERAPY ASSISTANT

## 2025-02-09 PROCEDURE — 97530 THERAPEUTIC ACTIVITIES: CPT | Mod: GP

## 2025-02-09 PROCEDURE — 99232 SBSQ HOSP IP/OBS MODERATE 35: CPT | Performed by: HOSPITALIST

## 2025-02-09 PROCEDURE — 97116 GAIT TRAINING THERAPY: CPT | Mod: GP

## 2025-02-09 RX ADMIN — MIRTAZAPINE 15 MG: 15 TABLET, FILM COATED ORAL at 20:47

## 2025-02-09 RX ADMIN — AMLODIPINE BESYLATE 5 MG: 5 TABLET ORAL at 08:10

## 2025-02-09 RX ADMIN — IRBESARTAN 300 MG: 150 TABLET ORAL at 08:10

## 2025-02-09 RX ADMIN — PANTOPRAZOLE SODIUM 40 MG: 40 INJECTION, POWDER, FOR SOLUTION INTRAVENOUS at 08:10

## 2025-02-09 RX ADMIN — CARVEDILOL 6.25 MG: 3.12 TABLET, FILM COATED ORAL at 17:00

## 2025-02-09 RX ADMIN — CARVEDILOL 6.25 MG: 3.12 TABLET, FILM COATED ORAL at 08:10

## 2025-02-09 RX ADMIN — FLUOXETINE HYDROCHLORIDE 20 MG: 20 CAPSULE ORAL at 08:10

## 2025-02-09 ASSESSMENT — ACTIVITIES OF DAILY LIVING (ADL)
ADLS_ACUITY_SCORE: 69

## 2025-02-09 NOTE — PROGRESS NOTES
1/8 1900-0730     Diagnosis:Nausea/vomiting   Interm[place]net Sinus Tachycardia/HTN   Anxiety, worsening dementia    Mental Status:A&O to self   Activity/dangle: Assistx1 GB/W  Diet:Regular  Pain:Denied  Vargas/Voiding:BR, goes frequently   Tele/Restraints/Iso:Tele SR  02/LDA:GODWIN NICKERSON  D/C Date:TBD  Other Info:VSS. Denied any Nausea/discomfort

## 2025-02-09 NOTE — PROGRESS NOTES
[facility]    Medicine Progress Note - Hospitalist Service    Date of Admission:  2/5/2025    Assessment & Plan   Kathy Braswell is a 88 yo F with HTN, anxiety disorder, depression, GERD, and IBS with several weeks of increased confusion and intermittent vomiting. Work-up notable for sodium 131, potassium 3.1, chloride 90, anion gap 18.  Renal function and hepatic panel within normal limits.  CBC within normal limits.  UA mildly abnormal but not clearly infected.  CT head negative for acute process.  CT abdomen pelvis with contrast also negative for acute process, no bowel obstruction.  EKG showed sinus tachycardia but otherwise negative.  Patient empirically started on 1 g ceftriaxone.  Admitted for further evaluation.    Vomiting  Etiology unclear.  No episodes since admission but has had some nausea.  Per son, may be related to episodes of anxiety.  No abdominal pain to raise concerns for gastritis or PUD. Hemoglobin stable arguing against a UGIB.  CT abdomen negative for bowel obstruction.  Exam negative for abdominal tenderness.  Query underlying esophageal disorder such as achalasia or a Zenker's diverticulum.   - SLP consult, appreciate assistance.  VFSS on 2/7 did not show any aspiration with any consistency.  Currently on regular diet, thin liquids with single sips.  SLP following.  - Antiemetics as needed  -Currently no significant nausea or vomiting in hospital.  - Doubt UTI playing significant role, urine culture with strep anginosus  K.  Complete 3-day course of antibiotic.    Intermittent sinus tachycardia, resolved  HTN  Asymptomatic, nonspecific. Etiology unclear.  Patient has known hypertension for which she is on an ACE inhibitor and amlodipine.  Noted heart rates intermittently into the 130s and were felt to be irregular. Exam notable for soft murmur.  Query runs of AFib/flutter?   - Tele suggests possible sinus dysrhythmia with PACs; no obvious AFib/Flutter  -  Carvedilol 6.25 BID added 2/6 with hold parameters and HR slightly better, down into 90s.  Monitor for tolerance, discontinue if not needed.  - Echo 2/7: Normal EF 60-65%, normal LV, RV, valves.  - Tele  - Continue PTA amlodipine and irbesartan    Anxiety, Worsening Dementia  Vulnerable adult  Patient has longstanding history of anxiety managed with Prozac and daily Ativan.  It appears that she was recently started on BuSpar.  On exam, her mood and affect are rapidly transversing the spectrum of emotions from happiness to tearful to fear.  It is difficult to pin down what exactly she is feeling.  Moreover, she appears to have advanced dementia, not formally diagnosed but family notes has been progressive for several years.  Lives at home with her 90-year-old sister who helps care for patient but will be unable to do so much longer.  -Query delirium.  - Psych consult for medication adjustments  - Continue PTA prozac, started on Remeron 15 mg nightly, PTA Buspar discontinued.  -2/9: Patient much improved, cheerful spirits  - Son agrees that patient cannot return home.  Will likely need  memory care TCU.  - Discussed CODE STATUS with son and he agrees that his mom should be DNR/DNI.  CODE STATUS updated in chart.  - Social work following     Long discussion with son and family at bedside on 2/9 regarding disposition arrangements.  Also discussed with care coordinator.  Family apprehensive about patient going back home, her sister cannot provide 24/7 care, she herself is elderly.       Diet: Regular Diet Adult Thin Liquids (level 0) (upright, single sips, liquid wash PRN) or as per SLP    DVT Prophylaxis: Pneumatic Compression Devices  Vargas Catheter: Not present  Lines: None     Cardiac Monitoring: ACTIVE order. Indication: Tachyarrhythmias, acute (48 hours)  Code Status: No CPR- Do NOT Intubate      Clinically Significant Risk Factors                   # Hypertension: Noted on problem list                       Social  "Drivers of Health    Physical Activity: Inactive (3/11/2024)    Exercise Vital Sign     Days of Exercise per Week: 0 days     Minutes of Exercise per Session: 0 min   Social Connections: Unknown (3/11/2024)    Social Connection and Isolation Panel [NHANES]     Frequency of Social Gatherings with Friends and Family: More than three times a week          Disposition Plan     Medically Ready for Discharge: Ready Now, pending disposition arrangements       Carmen Glaser MD  Hospitalist Service  [facility]  Securely message with [employer] (more info), search \"Bing Gleason\"  ______________________________________________________________________    Interval History   No overnight events.  Patient was sitting up in chair, alert and interactive.  Very cheerful, family at her side.  Tells me she is feeling a lot better and did not understand what happened yesterday.  Is pleasantly confused/forgetful at baseline.  Tells me she would like to go home.  However family at her side apprehensive about her going back home.    Physical Exam   Vital Signs: Temp: 98  F (36.7  C) Temp src: Oral BP: (!) 175/87 Pulse: 72   Resp: 16 SpO2: 95 % O2 Device: None (Room air)    Weight: 128 lbs 15.51 oz    GENERAL:   Alert, cooperative, answering simple questions, brighter spirits today  PULMONOLOGY: Clear    CARDIAC: RRR  ABDOMEN: Soft, nontender, nondistended  MUSCULOSKELETAL:  Moving x 4 spontaneously with CMS grossly intact x4.   NEURO: Alert and oriented to self only. Continue to be very repetitive and extremely forgetful.  CN II-XII grossly intact and symmetric.  Moving independently in bed.       Medical Decision Making       45 MINUTES SPENT BY ME on the date of service doing chart review, history, exam, documentation & further activities per the note.      Data         Imaging results reviewed over the past 24 hrs:   No results found for this or any previous visit (from the past 24 hours).      "

## 2025-02-10 ENCOUNTER — APPOINTMENT (OUTPATIENT)
Dept: OCCUPATIONAL THERAPY | Facility: CLINIC | Age: 89
End: 2025-02-10
Payer: COMMERCIAL

## 2025-02-10 PROCEDURE — 99232 SBSQ HOSP IP/OBS MODERATE 35: CPT | Performed by: HOSPITALIST

## 2025-02-10 PROCEDURE — 250N000013 HC RX MED GY IP 250 OP 250 PS 637: Performed by: PHYSICIAN ASSISTANT

## 2025-02-10 PROCEDURE — 250N000013 HC RX MED GY IP 250 OP 250 PS 637

## 2025-02-10 PROCEDURE — 120N000001 HC R&B MED SURG/OB

## 2025-02-10 PROCEDURE — 250N000009 HC RX 250: Performed by: INTERNAL MEDICINE

## 2025-02-10 RX ORDER — LOPERAMIDE HYDROCHLORIDE 2 MG/1
2 CAPSULE ORAL 4 TIMES DAILY PRN
Status: DISCONTINUED | OUTPATIENT
Start: 2025-02-10 | End: 2025-02-19 | Stop reason: HOSPADM

## 2025-02-10 RX ADMIN — CARVEDILOL 6.25 MG: 3.12 TABLET, FILM COATED ORAL at 17:22

## 2025-02-10 RX ADMIN — CARVEDILOL 6.25 MG: 3.12 TABLET, FILM COATED ORAL at 08:27

## 2025-02-10 RX ADMIN — AMLODIPINE BESYLATE 5 MG: 5 TABLET ORAL at 08:27

## 2025-02-10 RX ADMIN — FLUOXETINE HYDROCHLORIDE 20 MG: 20 CAPSULE ORAL at 08:27

## 2025-02-10 RX ADMIN — MIRTAZAPINE 15 MG: 15 TABLET, FILM COATED ORAL at 20:07

## 2025-02-10 RX ADMIN — IRBESARTAN 300 MG: 150 TABLET ORAL at 08:27

## 2025-02-10 RX ADMIN — PANTOPRAZOLE SODIUM 40 MG: 40 INJECTION, POWDER, FOR SOLUTION INTRAVENOUS at 08:27

## 2025-02-10 ASSESSMENT — ACTIVITIES OF DAILY LIVING (ADL)
ADLS_ACUITY_SCORE: 65
ADLS_ACUITY_SCORE: 65
ADLS_ACUITY_SCORE: 71
ADLS_ACUITY_SCORE: 67
ADLS_ACUITY_SCORE: 65
ADLS_ACUITY_SCORE: 67
ADLS_ACUITY_SCORE: 67
ADLS_ACUITY_SCORE: 65
ADLS_ACUITY_SCORE: 71
ADLS_ACUITY_SCORE: 65
ADLS_ACUITY_SCORE: 71
ADLS_ACUITY_SCORE: 71
ADLS_ACUITY_SCORE: 67
ADLS_ACUITY_SCORE: 67
ADLS_ACUITY_SCORE: 65
ADLS_ACUITY_SCORE: 65
ADLS_ACUITY_SCORE: 71
ADLS_ACUITY_SCORE: 71
ADLS_ACUITY_SCORE: 65
ADLS_ACUITY_SCORE: 65
ADLS_ACUITY_SCORE: 71
ADLS_ACUITY_SCORE: 65
ADLS_ACUITY_SCORE: 71

## 2025-02-10 NOTE — PROGRESS NOTES
Care Management Follow Up    Length of Stay (days): 4    Expected Discharge Date: 02/11/2025     Concerns to be Addressed: discharge planning     Patient plan of care discussed at interdisciplinary rounds: Yes    Anticipated Discharge Disposition: Transitional Care      Anticipated Discharge Services:    Anticipated Discharge DME:      Patient/family educated on Medicare website which has current facility and service quality ratings: yes  Education Provided on the Discharge Plan: Yes  Patient/Family in Agreement with the Plan: yes    Referrals Placed by CM/SW: Post Acute Facilities  Private pay costs discussed: Not applicable    Discussed  Partnership in Safe Discharge Planning  document with patient/family: No     Handoff Completed: No, handoff not indicated or clinically appropriate    Additional Information:  Reviewed referrals. [facility]. Additional referrals sent to [facility], [facility], [facility], [facility] and [facility].     1000: Writer met with patient's family in [facility]. They asked for an update but also wanted to discuss that patient cannot return home. Her sister was present for this conversation and said that she is currently 88 years old and can no longer provide physical assist. Writer confirmed that if home is not a safe discharge plan, then she would stay here until we find placement. They asked if SW/RN and providers would also gently remind her when visiting that she can't go home at this time. Writer confirmed this.     Next Steps: Update family today     KRZYSZTOF Topete

## 2025-02-10 NOTE — PROGRESS NOTES
[facility]    Medicine Progress Note - Hospitalist Service    Date of Admission:  2/5/2025    Assessment & Plan   Kathy Braswell is a 89 yo F with HTN, anxiety disorder, depression, GERD, and IBS with several weeks of increased confusion and intermittent vomiting. Work-up notable for sodium 131, potassium 3.1, chloride 90, anion gap 18.  Renal function and hepatic panel within normal limits.  CBC within normal limits.  UA mildly abnormal but not clearly infected.  CT head negative for acute process.  CT abdomen pelvis with contrast also negative for acute process, no bowel obstruction.  EKG showed sinus tachycardia but otherwise negative.  Patient empirically started on 1 g ceftriaxone.  Admitted for further evaluation.    Vomiting  Etiology unclear.  No episodes since admission but has had some nausea.  Per son, may be related to episodes of anxiety.  No abdominal pain to raise concerns for gastritis or PUD. Hemoglobin stable arguing against a UGIB.  CT abdomen negative for bowel obstruction.  Exam negative for abdominal tenderness.  Query underlying esophageal disorder such as achalasia or a Zenker's diverticulum.   - SLP consult, appreciate assistance.  VFSS on 2/7 did not show any aspiration with any consistency.  Currently on regular diet, thin liquids with single sips.  SLP following.  - Antiemetics as needed  -Currently no significant nausea or vomiting in hospital.  - Doubt UTI playing significant role, urine culture with strep anginosus  K.  Complete 3-day course of antibiotic.    Intermittent sinus tachycardia, resolved  HTN  Asymptomatic, nonspecific. Etiology unclear.  Patient has known hypertension for which she is on an ACE inhibitor and amlodipine.  Noted heart rates intermittently into the 130s and were felt to be irregular. Exam notable for soft murmur.  Query runs of AFib/flutter?   - Tele suggests possible sinus dysrhythmia with PACs; no obvious AFib/Flutter  -  "Carvedilol 6.25 BID added 2/6 with hold parameters and HR slightly better, down into 90s.  Monitor for tolerance, discontinue if not needed.  - Echo 2/7: Normal EF 60-65%, normal LV, RV, valves.  - Tele  - Continue PTA amlodipine and irbesartan    Anxiety, Worsening Dementia  Vulnerable adult  Patient has longstanding history of anxiety managed with Prozac and daily Ativan.  It appears that she was recently started on BuSpar.  On exam, her mood and affect are rapidly transversing the spectrum of emotions from happiness to tearful to fear.  It is difficult to pin down what exactly she is feeling.  Moreover, she appears to have advanced dementia, not formally diagnosed but family notes has been progressive for several years.  Lives at home with her 90-year-old sister who helps care for patient but will be unable to do so much longer.  -Query delirium.  - Psych consult for medication adjustments  - Continue PTA prozac, started on Remeron 15 mg nightly, PTA Buspar discontinued.  -2/9: Patient much improved, cheerful spirits  - Son agrees that patient cannot return home.  Will likely need memory care TCU/LTC.  - Discussed CODE STATUS with son and he agrees that his mom should be DNR/DNI.  CODE STATUS updated in chart.  - Social work following     Long discussion with son and family at bedside on 2/9 regarding disposition arrangements.  Also discussed with care coordinator.  Family apprehensive about patient going back home, her sister cannot provide 24/7 care, she herself is elderly.  --Discussed at length again with SW, family and patient on 2/10.  Patient very anxious and apprehensive about going anywhere other than home.  Keeps asking me if I will \"believe\" her and just send her back home.  She keeps insisting that her sister can take care of her.  Family understandably quite stressed with the situation and were tearful at her side.         Diet: Regular Diet Adult Thin Liquids (level 0) (upright, single sips, liquid " "wash PRN) or as per SLP    DVT Prophylaxis: Pneumatic Compression Devices  Vargas Catheter: Not present  Lines: None     Cardiac Monitoring: ACTIVE order. Indication: Tachyarrhythmias, acute (48 hours)  Code Status: No CPR- Do NOT Intubate      Clinically Significant Risk Factors                   # Hypertension: Noted on problem list                       Social Drivers of Health    Physical Activity: Inactive (3/11/2024)    Exercise Vital Sign     Days of Exercise per Week: 0 days     Minutes of Exercise per Session: 0 min   Social Connections: Unknown (3/11/2024)    Social Connection and Isolation Panel [NHANES]     Frequency of Social Gatherings with Friends and Family: More than three times a week          Disposition Plan     Medically Ready for Discharge: Ready Now, pending disposition arrangements       Carmen Glaser MD  Hospitalist Service  [facility]  Securely message with [employer] (more info), search \"Bing Gleason\"  ______________________________________________________________________    Interval History   No overnight events.  Patient was sitting up in chair, alert , appeared anxious today due to discussions surrounding disposition arrangements-needing facility.  Patient [place]  Kept insisting that her sister is capable of taking care of her, she can do the laundry and dishes etc.  Family was quite distressed with seeing patient so anxious and insisting on going home.  However they all were firm that patient is unsafe at home and wanted staff to reiterate to patient that she is unsafe at home.    Physical Exam   Vital Signs: Temp: 99.1  F (37.3  C) Temp src: Oral BP: 128/73 Pulse: 84   Resp: 18 SpO2: 93 % O2 Device: None (Room air)    Weight: 128 lbs 15.51 oz    GENERAL:   Alert, cooperative, answering simple questions, was anxious about wanting to go back home  PULMONOLOGY: Clear    CARDIAC: RRR  ABDOMEN: " Soft, nontender, nondistended  MUSCULOSKELETAL:  Moving x 4 spontaneously with CMS grossly intact x4.   NEURO: Alert and oriented to self only. Continue to be very repetitive and extremely forgetful.  CN II-XII grossly intact and symmetric.  Moving independently in bed.       Medical Decision Making       45 MINUTES SPENT BY ME on the date of service doing chart review, history, exam, documentation & further activities per the note.      Data         Imaging results reviewed over the past 24 hrs:   No results found for this or any previous visit (from the past 24 hours).

## 2025-02-10 NOTE — PLAN OF CARE
Date/Time: 2/9/25 0700 - 2300  Diagnosis: N/V/AMS/ UTI  Mental Status: A&O to self  Activity/dangle: Ast 1 GB/W  Diet: Regular diet  Pain: Denies pain  Vargas/Voiding: Voiding in the bathroom  Tele/Restraints/Iso: Tele - SR  02/LDA: Room air. IV saline locked  D/C Date: TBD

## 2025-02-10 NOTE — PLAN OF CARE
Goal Outcome Evaluation:                    Summary:  [id number]    Diagnosis:Nausea/vomiting; anxiety w/worsening dementia; UTI      Mental Status:A&Ox1, forgetful, worsening dementia  Activity/dangle:Assistx1 GB/W  Diet:Regular   Pain:Denied   Vargas/Voiding:BR, frequent urination   Tele/Restraints/Iso:Tele was NSR  02/LDA:EFRA  D/C Date:tbd, TCU   Other Info:VSS on JUAN MANUEL MILNER protocol recheck in AM

## 2025-02-11 ENCOUNTER — APPOINTMENT (OUTPATIENT)
Dept: SPEECH THERAPY | Facility: CLINIC | Age: 89
End: 2025-02-11
Payer: COMMERCIAL

## 2025-02-11 LAB — POTASSIUM SERPL-SCNC: 3.5 MMOL/L (ref 3.4–5.3)

## 2025-02-11 PROCEDURE — 99232 SBSQ HOSP IP/OBS MODERATE 35: CPT | Performed by: HOSPITALIST

## 2025-02-11 PROCEDURE — 250N000013 HC RX MED GY IP 250 OP 250 PS 637

## 2025-02-11 PROCEDURE — 84132 ASSAY OF SERUM POTASSIUM: CPT | Performed by: HOSPITALIST

## 2025-02-11 PROCEDURE — 36415 COLL VENOUS BLD VENIPUNCTURE: CPT | Performed by: HOSPITALIST

## 2025-02-11 PROCEDURE — 250N000013 HC RX MED GY IP 250 OP 250 PS 637: Performed by: INTERNAL MEDICINE

## 2025-02-11 PROCEDURE — 250N000013 HC RX MED GY IP 250 OP 250 PS 637: Performed by: HOSPITALIST

## 2025-02-11 PROCEDURE — 92526 ORAL FUNCTION THERAPY: CPT | Mod: GN | Performed by: SPEECH-LANGUAGE PATHOLOGIST

## 2025-02-11 PROCEDURE — 250N000013 HC RX MED GY IP 250 OP 250 PS 637: Performed by: PHYSICIAN ASSISTANT

## 2025-02-11 PROCEDURE — 120N000001 HC R&B MED SURG/OB

## 2025-02-11 RX ORDER — WATER 10 ML/10ML
INJECTION INTRAMUSCULAR; INTRAVENOUS; SUBCUTANEOUS
Status: DISCONTINUED
Start: 2025-02-11 | End: 2025-02-11 | Stop reason: WASHOUT

## 2025-02-11 RX ORDER — PANTOPRAZOLE SODIUM 40 MG/1
40 TABLET, DELAYED RELEASE ORAL
Status: DISCONTINUED | OUTPATIENT
Start: 2025-02-11 | End: 2025-02-19 | Stop reason: HOSPADM

## 2025-02-11 RX ADMIN — CARVEDILOL 6.25 MG: 3.12 TABLET, FILM COATED ORAL at 18:36

## 2025-02-11 RX ADMIN — QUETIAPINE 12.5 MG: 25 TABLET, FILM COATED ORAL at 12:57

## 2025-02-11 RX ADMIN — IRBESARTAN 300 MG: 150 TABLET ORAL at 08:20

## 2025-02-11 RX ADMIN — CARVEDILOL 6.25 MG: 3.12 TABLET, FILM COATED ORAL at 08:20

## 2025-02-11 RX ADMIN — AMLODIPINE BESYLATE 5 MG: 5 TABLET ORAL at 08:20

## 2025-02-11 RX ADMIN — PANTOPRAZOLE SODIUM 40 MG: 40 TABLET, DELAYED RELEASE ORAL at 10:00

## 2025-02-11 RX ADMIN — FLUOXETINE HYDROCHLORIDE 20 MG: 20 CAPSULE ORAL at 08:20

## 2025-02-11 RX ADMIN — MIRTAZAPINE 15 MG: 15 TABLET, FILM COATED ORAL at 20:46

## 2025-02-11 ASSESSMENT — ACTIVITIES OF DAILY LIVING (ADL)
ADLS_ACUITY_SCORE: 71
ADLS_ACUITY_SCORE: 65
ADLS_ACUITY_SCORE: 69
ADLS_ACUITY_SCORE: 65
ADLS_ACUITY_SCORE: 65
ADLS_ACUITY_SCORE: 69
ADLS_ACUITY_SCORE: 65
ADLS_ACUITY_SCORE: 71
ADLS_ACUITY_SCORE: 65
ADLS_ACUITY_SCORE: 69
ADLS_ACUITY_SCORE: 65
ADLS_ACUITY_SCORE: 71
ADLS_ACUITY_SCORE: 65
ADLS_ACUITY_SCORE: 69
ADLS_ACUITY_SCORE: 65
ADLS_ACUITY_SCORE: 65

## 2025-02-11 NOTE — PLAN OF CARE
Speech Language Therapy Discharge Summary    Reason for therapy discharge:    All goals and outcomes met, no further needs identified.    Progress towards therapy goal(s). See goals on Care Plan in [facility] electronic health record for goal details.  Goals met    Therapy recommendation(s):    No further therapy is recommended.Patient discharged on a regular diet and thin liquids with standard swallow precautions. Increased risk for aspiration if she is very anxious.   VFSS completed: minimal deficits, WFL for regular/thin diet. Recommend upright positioning, slow rate, single bites/sips at a time, self-select softer/moister items as needed, liquid wash PRN.

## 2025-02-11 NOTE — PROGRESS NOTES
2/10 [id number]     Diagnosis: N/V/AMS/ UTI    Mental Status: A&O to self  Activity/dangle: Assistx 1 GB/W  Diet: Regular   Pain: Denies pain  Vargas/Voiding: Voiding in the bathroom  Tele/Restraints/Iso: Tele SR  02/LDA: GODWIN NICKERSON  D/C Date: TBD, course of action pending  memory care TCU/LTC. Medically ready for discharge, just pending disposition arrangements. Attempted to straight cath patient, while trying patient refused and could not relax enough to successfully do it.

## 2025-02-11 NOTE — PROGRESS NOTES
.Date/Time 2/11/25 [id number]  Diagnosis:  POD#:NA  Mental Status:A&Ox2, Intermittently confused  Activity/dangle: A1& walker  Diet:Reg  Pain: denies  Vargas/Voiding: Straight Cath x1, DTV   Tele/Restraints/Iso:NA  02/LDA:LIYA ZAMBRANO  D/C Date:Pending to TCU/ LTC  Other Info:VSS on RA. Seroquel x1 for Anxiety. Family By bedside.

## 2025-02-11 NOTE — PROGRESS NOTES
[facility]    Medicine Progress Note - Hospitalist Service    Date of Admission:  2/5/2025    Assessment & Plan   Kathy Braswell is a 88 yo F with HTN, anxiety disorder, depression, GERD, and IBS with several weeks of increased confusion and intermittent vomiting. Work-up notable for sodium 131, potassium 3.1, chloride 90, anion gap 18.  Renal function and hepatic panel within normal limits.  CBC within normal limits.  UA mildly abnormal but not clearly infected.  CT head negative for acute process.  CT abdomen pelvis with contrast also negative for acute process, no bowel obstruction.  EKG showed sinus tachycardia but otherwise negative.  Patient empirically started on 1 g ceftriaxone.  Admitted for further evaluation.    Vomiting, resolved  Etiology unclear.  No episodes since admission but has had some nausea.  Per son, may be related to episodes of anxiety.  No abdominal pain to raise concerns for gastritis or PUD. Hemoglobin stable arguing against a UGIB.  CT abdomen negative for bowel obstruction.  Exam negative for abdominal tenderness.  Query underlying esophageal disorder such as achalasia or a Zenker's diverticulum.   - SLP consult, appreciate assistance.  VFSS on 2/7 did not show any aspiration with any consistency.  Currently on regular diet, thin liquids with single sips.  SLP following.  - Antiemetics as needed  -Currently no significant nausea or vomiting in hospital.  - Doubt UTI playing significant role, urine culture with strep anginosus  K.  Completed 3-day course of antibiotic.    Intermittent sinus tachycardia, resolved  HTN  Asymptomatic, nonspecific. Etiology unclear.  Patient has known hypertension for which she is on an ACE inhibitor and amlodipine.  Noted heart rates intermittently into the 130s and were felt to be irregular. Exam notable for soft murmur.  Query runs of AFib/flutter?   - Tele suggests possible sinus dysrhythmia with PACs; no obvious  AFib/Flutter  - Carvedilol 6.25 BID added 2/6 with hold parameters and HR slightly better, down into 90s.  Monitor for tolerance, discontinue if not needed.  - Echo 2/7: Normal EF 60-65%, normal LV, RV, valves.  - Tele discontinued  - Continue PTA amlodipine and irbesartan    Anxiety, Worsening Dementia  Vulnerable adult  Patient has longstanding history of anxiety managed with Prozac and daily Ativan.  It appears that she was recently started on BuSpar.  On exam, her mood and affect are rapidly transversing the spectrum of emotions from happiness to tearful to fear.  It is difficult to pin down what exactly she is feeling.  Moreover, she appears to have advanced dementia, not formally diagnosed but family notes has been progressive for several years.  Lives at home with her 90-year-old sister who helps care for patient but will be unable to do so much longer.  -Query delirium.  - Psych consult for medication adjustments  - Continue PTA prozac, started on Remeron 15 mg nightly, PTA Buspar discontinued.  -2/9: Patient much improved, cheerful spirits  - Son agrees that patient cannot return home.  Will likely need memory care TCU/LTC.  - Discussed CODE STATUS with son and he agrees that his mom should be DNR/DNI.  CODE STATUS updated in chart.  - Social work following  -2/11: Patient with increased anxiety especially when anyone discusses disposition with her.  Fixated on wanting to go home and does not appear to understand limitations of her memory loss.  Ordered as needed Seroquel 12.5 Mg twice daily for anxiety.     Long discussion with son and family at bedside on 2/9 regarding disposition arrangements.  Also discussed with care coordinator.  Family apprehensive about patient going back home, her sister cannot provide 24/7 care, she herself is elderly.  --Discussed at length again with SW, family and patient on 2/10.  Patient very anxious and apprehensive about going anywhere other than home.  Keeps asking me if I  "will \"believe\" her and just send her back home.  She keeps insisting that her sister can take care of her.  Family understandably quite stressed with the situation and were tearful at her side.        Diet: Regular Diet Adult Thin Liquids (level 0) (upright, single sips, liquid wash PRN)  Room Service or as per SLP    DVT Prophylaxis: Pneumatic Compression Devices  Vargas Catheter: Not present  Lines: None     Cardiac Monitoring: ACTIVE order. Indication: Tachyarrhythmias, acute (48 hours)  Code Status: No CPR- Do NOT Intubate      Clinically Significant Risk Factors                   # Hypertension: Noted on problem list                       Social Drivers of Health    Physical Activity: Inactive (3/11/2024)    Exercise Vital Sign     Days of Exercise per Week: 0 days     Minutes of Exercise per Session: 0 min   Social Connections: Unknown (3/11/2024)    Social Connection and Isolation Panel [NHANES]     Frequency of Social Gatherings with Friends and Family: More than three times a week          Disposition Plan     Medically Ready for Discharge: Ready Now, pending disposition arrangements       Carmen Glaser MD  Hospitalist Service  [facility]  Securely message with [employer] (more info), search \"Bing Gleason\"  ______________________________________________________________________    Interval History   No overnight events.  Patient was laying in bed-appeared anxious, kept saying she wants to go home.  No acute events per nursing.  Awaiting placement.    Physical Exam   Vital Signs: Temp: 98.6  F (37  C) Temp src: Oral BP: 126/66 Pulse: 77   Resp: 16 SpO2: 95 % O2 Device: None (Room air)    Weight: 128 lbs 15.51 oz    GENERAL:   Alert, cooperative, answering simple questions, was anxious about wanting to go back home  PULMONOLOGY: Clear    CARDIAC: RRR  ABDOMEN: Soft, nontender, nondistended  MUSCULOSKELETAL:  Moving x 4 spontaneously with CMS grossly intact x4.   NEURO: Alert and " oriented to self only. Continue to be very repetitive and extremely forgetful.  CN II-XII grossly intact and symmetric.  Moving independently in bed.       Medical Decision Making       45 MINUTES SPENT BY ME on the date of service doing chart review, history, exam, documentation & further activities per the note.      Data     I have personally reviewed the following data over the past 24 hrs:    N/A  \   N/A   / N/A     N/A N/A N/A /  N/A   3.5 N/A N/A \       Imaging results reviewed over the past 24 hrs:   No results found for this or any previous visit (from the past 24 hours).

## 2025-02-11 NOTE — PLAN OF CARE
Date/Time: 2/10/25 0700 - 1900  Diagnosis: N/V/AMS/ UTI  Mental Status: A&O to self  Activity/dangle: Ast 1 GB/W  Diet: Regular diet  Pain: Denies pain  Vargas/Voiding: Voiding in the bathroom  Tele/Restraints/Iso: Tele - SR  02/LDA: Room air. IV saline locked  D/C Date: TBD

## 2025-02-12 ENCOUNTER — APPOINTMENT (OUTPATIENT)
Dept: PHYSICAL THERAPY | Facility: CLINIC | Age: 89
End: 2025-02-12
Payer: COMMERCIAL

## 2025-02-12 PROCEDURE — 250N000013 HC RX MED GY IP 250 OP 250 PS 637: Performed by: INTERNAL MEDICINE

## 2025-02-12 PROCEDURE — 250N000013 HC RX MED GY IP 250 OP 250 PS 637: Performed by: HOSPITALIST

## 2025-02-12 PROCEDURE — 97530 THERAPEUTIC ACTIVITIES: CPT | Mod: GP | Performed by: PHYSICAL THERAPIST

## 2025-02-12 PROCEDURE — 120N000001 HC R&B MED SURG/OB

## 2025-02-12 PROCEDURE — 99207 PR NO BILLABLE SERVICE THIS VISIT: CPT | Performed by: NURSE PRACTITIONER

## 2025-02-12 PROCEDURE — 97116 GAIT TRAINING THERAPY: CPT | Mod: GP | Performed by: PHYSICAL THERAPIST

## 2025-02-12 PROCEDURE — 250N000013 HC RX MED GY IP 250 OP 250 PS 637: Performed by: PHYSICIAN ASSISTANT

## 2025-02-12 PROCEDURE — 250N000013 HC RX MED GY IP 250 OP 250 PS 637

## 2025-02-12 PROCEDURE — 99231 SBSQ HOSP IP/OBS SF/LOW 25: CPT | Performed by: HOSPITALIST

## 2025-02-12 RX ADMIN — MIRTAZAPINE 15 MG: 15 TABLET, FILM COATED ORAL at 20:07

## 2025-02-12 RX ADMIN — CARVEDILOL 6.25 MG: 3.12 TABLET, FILM COATED ORAL at 08:20

## 2025-02-12 RX ADMIN — AMLODIPINE BESYLATE 5 MG: 5 TABLET ORAL at 08:21

## 2025-02-12 RX ADMIN — PANTOPRAZOLE SODIUM 40 MG: 40 TABLET, DELAYED RELEASE ORAL at 06:33

## 2025-02-12 RX ADMIN — FLUOXETINE HYDROCHLORIDE 20 MG: 20 CAPSULE ORAL at 08:21

## 2025-02-12 RX ADMIN — CARVEDILOL 6.25 MG: 3.12 TABLET, FILM COATED ORAL at 17:40

## 2025-02-12 RX ADMIN — IRBESARTAN 300 MG: 150 TABLET ORAL at 08:21

## 2025-02-12 RX ADMIN — QUETIAPINE 12.5 MG: 25 TABLET, FILM COATED ORAL at 16:13

## 2025-02-12 ASSESSMENT — ACTIVITIES OF DAILY LIVING (ADL)
ADLS_ACUITY_SCORE: 72
ADLS_ACUITY_SCORE: 71
ADLS_ACUITY_SCORE: 71
ADLS_ACUITY_SCORE: 72
ADLS_ACUITY_SCORE: 72
ADLS_ACUITY_SCORE: 71
ADLS_ACUITY_SCORE: 71
ADLS_ACUITY_SCORE: 72
ADLS_ACUITY_SCORE: 71
ADLS_ACUITY_SCORE: 72
ADLS_ACUITY_SCORE: 72
ADLS_ACUITY_SCORE: 71
ADLS_ACUITY_SCORE: 72
ADLS_ACUITY_SCORE: 72
ADLS_ACUITY_SCORE: 71
ADLS_ACUITY_SCORE: 71
ADLS_ACUITY_SCORE: 72
ADLS_ACUITY_SCORE: 71
ADLS_ACUITY_SCORE: 73
ADLS_ACUITY_SCORE: 72
ADLS_ACUITY_SCORE: 72

## 2025-02-12 NOTE — PROGRESS NOTES
Orientation/Cognitive: A/Ox2, disorientated to time and date  Mobility Level/Assist Equipment: Ax1 GB and walker  Fall Risk (Y/N): yes  Behavior Concerns: anxious at times, prn seroquel given x1  Pain Management: no pain reported this shift  Tele/VS/O2: VSS on RA  ABNL Lab/BG: n/a  Diet: Reg diet  Bowel/Bladder: pt retaining urine, straight cathed x1 @ 1400. Bladder scan @ 1807 for 217. Pt is able to avoid small amounts at times. 2 Bms this shift  Skin Concerns: pale otherwise intact  Drains/Devices: PIV SL  Tests/Procedures for next shift: SW following for discharge  Anticipated DC date & active delays: tbd

## 2025-02-12 NOTE — PROGRESS NOTES
[facility]    Medicine Progress Note - Hospitalist Service    Date of Admission:  2/5/2025    Assessment & Plan   Kathy Braswell is a 88 yo F with HTN, anxiety disorder, depression, GERD, and IBS with several weeks of increased confusion and intermittent vomiting. Work-up notable for sodium 131, potassium 3.1, chloride 90, anion gap 18.  Renal function and hepatic panel within normal limits.  CBC within normal limits.  UA mildly abnormal but not clearly infected.  CT head negative for acute process.  CT abdomen pelvis with contrast also negative for acute process, no bowel obstruction.  EKG showed sinus tachycardia but otherwise negative.  Patient empirically started on 1 g ceftriaxone.  Admitted for further evaluation.    Vomiting, resolved  Etiology unclear.  No episodes since admission but has had some nausea.  Per son, may be related to episodes of anxiety.  No abdominal pain to raise concerns for gastritis or PUD. Hemoglobin stable arguing against a UGIB.  CT abdomen negative for bowel obstruction.  Exam negative for abdominal tenderness.  Query underlying esophageal disorder such as achalasia or a Zenker's diverticulum.   - SLP consult, appreciate assistance.  VFSS on 2/7 did not show any aspiration with any consistency.  Currently on regular diet, thin liquids with single sips.  SLP following.  - Antiemetics as needed  -Currently no significant nausea or vomiting in hospital.  - Doubt UTI playing significant role, urine culture with strep anginosus  K.  Completed 3-day course of antibiotic.    Intermittent sinus tachycardia, resolved  HTN  Asymptomatic, nonspecific. Etiology unclear.  Patient has known hypertension for which she is on an ACE inhibitor and amlodipine.  Noted heart rates intermittently into the 130s and were felt to be irregular. Exam notable for soft murmur.  Query runs of AFib/flutter?   - Tele suggests possible sinus dysrhythmia with PACs; no obvious  AFib/Flutter  - Carvedilol 6.25 BID added 2/6 with hold parameters and HR slightly better, down into 90s.  Monitor for tolerance, discontinue if not needed.  - Echo 2/7: Normal EF 60-65%, normal LV, RV, valves.  - Tele discontinued  - Continue PTA amlodipine and irbesartan    Anxiety, Worsening Dementia  Vulnerable adult  Patient has longstanding history of anxiety managed with Prozac and daily Ativan.  It appears that she was recently started on BuSpar.  On exam, her mood and affect are rapidly transversing the spectrum of emotions from happiness to tearful to fear.  It is difficult to pin down what exactly she is feeling.  Moreover, she appears to have advanced dementia, not formally diagnosed but family notes has been progressive for several years.  Lives at home with her 90-year-old sister who helps care for patient but will be unable to do so much longer.  -Query delirium.  - Psych consult for medication adjustments  - Continue PTA prozac, started on Remeron 15 mg nightly, PTA Buspar discontinued.  -2/9: Patient much improved, cheerful spirits  - Son agrees that patient cannot return home.  Will likely need memory care TCU/LTC.  - Discussed CODE STATUS with son and he agrees that his mom should be DNR/DNI.  CODE STATUS updated in chart.  - Social work following  -2/11: Patient with increased anxiety especially when anyone discusses disposition with her.  Fixated on wanting to go home and does not appear to understand limitations of her memory loss.  Ordered as needed Seroquel 12.5 Mg twice daily for anxiety.     Long discussion with son and family at bedside on 2/9 regarding disposition arrangements.  Also discussed with care coordinator.  Family apprehensive about patient going back home, her sister cannot provide 24/7 care, she herself is elderly.  --Discussed at length again with SW, family and patient on 2/10.  Patient very anxious and apprehensive about going anywhere other than home.  Keeps asking me if I  "will \"believe\" her and just send her back home.  She keeps insisting that her sister can take care of her.  Family understandably quite stressed with the situation and were tearful at her side.  --2/12: Appears has been accepted at a TCU, awaiting bed confirmation.      Diet: Regular Diet Adult Thin Liquids (level 0) (upright, single sips, liquid wash PRN)  Room Service or as per SLP    DVT Prophylaxis: Pneumatic Compression Devices  Vargas Catheter: Not present  Lines: None     Cardiac Monitoring: None  Code Status: No CPR- Do NOT Intubate      Clinically Significant Risk Factors                   # Hypertension: Noted on problem list                       Social Drivers of Health    Physical Activity: Inactive (3/11/2024)    Exercise Vital Sign     Days of Exercise per Week: 0 days     Minutes of Exercise per Session: 0 min   Social Connections: Unknown (3/11/2024)    Social Connection and Isolation Panel [NHANES]     Frequency of Social Gatherings with Friends and Family: More than three times a week          Disposition Plan     Medically Ready for Discharge: Ready Now, pending disposition arrangements       Carmen Glaser MD  Hospitalist Service  [facility]  Securely message with [employer] (more info), search \"Bing Gleason\"  ______________________________________________________________________    Interval History   No overnight events.  Patient was calm and pleasant, sitting up in chair.  Was not anxious like yesterday.  Was asking for some breakfast to be ordered as she woke up late.  No acute concerns.  Awaiting disposition.    Physical Exam   Vital Signs: Temp: 97.6  F (36.4  C) Temp src: Oral BP: (!) 156/80 Pulse: 77   Resp: 16 SpO2: 97 % O2 Device: None (Room air)    Weight: 128 lbs 15.51 oz    GENERAL:   Alert, cooperative, answering simple questions  PULMONOLOGY: Clear    CARDIAC: RRR  ABDOMEN: Soft, nontender, nondistended  MUSCULOSKELETAL:  Moving x 4 spontaneously with " CMS grossly intact x4.   NEURO: Alert and oriented to self only. Continue to be very repetitive and extremely forgetful.  CN II-XII grossly intact and symmetric.  Moving independently in bed.       Medical Decision Making       25 MINUTES SPENT BY ME on the date of service doing chart review, history, exam, documentation & further activities per the note.      Data         Imaging results reviewed over the past 24 hrs:   No results found for this or any previous visit (from the past 24 hours).

## 2025-02-12 NOTE — PROGRESS NOTES
Care Management Follow Up    Length of Stay (days): 6    Expected Discharge Date: 02/13/2025     Concerns to be Addressed: discharge planning     Patient plan of care discussed at interdisciplinary rounds: Yes    Anticipated Discharge Disposition: Transitional Care              Anticipated Discharge Services:    Anticipated Discharge DME:      Patient/family educated on Medicare website which has current facility and service quality ratings: yes  Education Provided on the Discharge Plan: Yes  Patient/Family in Agreement with the Plan: yes    Referrals Placed by CM/SW: Post Acute Facilities  Private pay costs discussed: Not applicable    Discussed  Partnership in Safe Discharge Planning  document with patient/family: No     Handoff Completed: No, handoff not indicated or clinically appropriate    Additional Information:    JARRETT noted that patient has been accepted to [facility]. [facility] has LTC on the same property.  JARRETT called Eric, alize camacho, to inform him of acceptance. Eric and JARRETT discussed possible transition of TCU to LTC and Eric seems agreeable. JARRETT tried contacting [place] at [facility] and [facility] admissions but was unable to get a hold of anyone to confirm bed.    Add: JARRETT spoke to son who states they would like patient closer to [place] as that is where he works. J[facility] as [facility] were still pending. [facility] is private pay and has a $10,000 deposit. Rate is about $500-600 a day and decreases to around $400-500 a day after 30 days. There is no available bed until Monday. JARRETT called and discussed this with patients son, Eric. Eric is considering and is understanding of the cost.     Add: Eric called JARRETT to update JARRETT that his wife only works about 1 day a week at a hair salon and could provide full time supervision and assistance and that they would like to go in that direction. JARRETT and Eric discussed possibility of home care as that is therapies recommendation. Eric will make a  decision by tomorrow, 2/13. SW looked at home care referrals and noted that [facility] had accepted patient on 2/6.     Next Steps: plan discharge    GARCIA Petersen

## 2025-02-12 NOTE — PROGRESS NOTES
"House ANNIE brief note:    Unwitnessed, unassisted suspected mechanical fall without obvious injury.    Contacted by nursing at 0009 noting \"pt slid down on the floor from the w/c and a RA who was walking by assisted pt back to bed.  Pt denying pain.  Ambulating as usual.  Please assess the pt.\"  Upon arrival, pt lying in bed, awake, alert, in no overt distress, very pleasant.  Pt notes \"I was messing around with my phone\" and \"not wearing the right shoes\" and could not stand up.  Nursing notes pt was sitting in the recliner, chair alarm sounded, nursing promptly entered pt's room to find pt sitting upright, buttock on the floor right in front of the recliner, back against recliner, BLE extended forward.  Nursing notes able to assist pt up without difficulty.  Nursing assisted pt to bathroom at that time.  Pt reports no acute pain post fall.  Pt's head without obvious trauma, able to perform full ROM with cervical spine.  No obvious skin injury noted on pt's back, buttock, hips; did note small R lateral knee skin tear (unclear if new).  Pt's VS noting SBP 150s, HR 80s, RR 10s, O2 sats > 92% on RA, afebrile.  Pt notes \"I've apologized to everyone\".  Pt reports history of falling at home as well.      Interventions:  - Maintain fall precautions at all times; formally ordered    SHWETA Lancaster, CNP  House [facility]    No charge.   "

## 2025-02-12 NOTE — PROGRESS NOTES
Date/Time 2/11/25 7827-1525  Diagnosis:N/V, Increased confusion, Worsening dementia  POD#:NA  Mental Status:A&Ox2, disoriented to time/situation  Activity/dangle: Assist of one, GB/walker  Diet:Reg  Pain: denies  Vargas/Voiding: Incontinent of bladder, also voiding in BR in small amounts                          Continent of BM  Tele/Restraints/Iso:NA  02/LDA:Room air, L PIV  saline locked  D/C Date:Pending to TCU placement  Other Info: PVR was 0639 at 0300, st cath at 0400 with 700 o/p                    Intermittently attempts to get up and out of bed                    Long arm sit                    See NP note 2/12 at 0110H

## 2025-02-13 ENCOUNTER — APPOINTMENT (OUTPATIENT)
Dept: OCCUPATIONAL THERAPY | Facility: CLINIC | Age: 89
End: 2025-02-13
Payer: COMMERCIAL

## 2025-02-13 VITALS
SYSTOLIC BLOOD PRESSURE: 129 MMHG | WEIGHT: 128.97 LBS | HEART RATE: 77 BPM | TEMPERATURE: 98.4 F | BODY MASS INDEX: 26.95 KG/M2 | DIASTOLIC BLOOD PRESSURE: 61 MMHG | RESPIRATION RATE: 18 BRPM | OXYGEN SATURATION: 95 %

## 2025-02-13 PROCEDURE — 99232 SBSQ HOSP IP/OBS MODERATE 35: CPT | Performed by: STUDENT IN AN ORGANIZED HEALTH CARE EDUCATION/TRAINING PROGRAM

## 2025-02-13 PROCEDURE — 97535 SELF CARE MNGMENT TRAINING: CPT | Mod: GO

## 2025-02-13 PROCEDURE — 250N000013 HC RX MED GY IP 250 OP 250 PS 637

## 2025-02-13 PROCEDURE — 120N000001 HC R&B MED SURG/OB

## 2025-02-13 PROCEDURE — 250N000013 HC RX MED GY IP 250 OP 250 PS 637: Performed by: INTERNAL MEDICINE

## 2025-02-13 PROCEDURE — 250N000013 HC RX MED GY IP 250 OP 250 PS 637: Performed by: PHYSICIAN ASSISTANT

## 2025-02-13 RX ADMIN — MIRTAZAPINE 15 MG: 15 TABLET, FILM COATED ORAL at 21:34

## 2025-02-13 RX ADMIN — AMLODIPINE BESYLATE 5 MG: 5 TABLET ORAL at 09:53

## 2025-02-13 RX ADMIN — FLUOXETINE HYDROCHLORIDE 20 MG: 20 CAPSULE ORAL at 09:53

## 2025-02-13 RX ADMIN — CARVEDILOL 6.25 MG: 3.12 TABLET, FILM COATED ORAL at 20:04

## 2025-02-13 RX ADMIN — IRBESARTAN 300 MG: 150 TABLET ORAL at 09:53

## 2025-02-13 RX ADMIN — PANTOPRAZOLE SODIUM 40 MG: 40 TABLET, DELAYED RELEASE ORAL at 06:32

## 2025-02-13 RX ADMIN — CARVEDILOL 6.25 MG: 3.12 TABLET, FILM COATED ORAL at 09:53

## 2025-02-13 ASSESSMENT — ACTIVITIES OF DAILY LIVING (ADL)
ADLS_ACUITY_SCORE: 73
ADLS_ACUITY_SCORE: 71
ADLS_ACUITY_SCORE: 73
ADLS_ACUITY_SCORE: 71
ADLS_ACUITY_SCORE: 73
ADLS_ACUITY_SCORE: 71
ADLS_ACUITY_SCORE: 73
ADLS_ACUITY_SCORE: 71
ADLS_ACUITY_SCORE: 73
ADLS_ACUITY_SCORE: 71
ADLS_ACUITY_SCORE: 73
ADLS_ACUITY_SCORE: 71
ADLS_ACUITY_SCORE: 73
ADLS_ACUITY_SCORE: 73

## 2025-02-13 NOTE — PROGRESS NOTES
"BRIEF NUTRITION ASSESSMENT    REASON FOR ASSESSMENT:  Kathy Braswell is a 89 year old female seen by Registered Dietitian for LOS    NUTRITION HISTORY:  - Pt was relatively confused vs distressed while wanting to know \"what's going on with me?,\" when she \"can just go back home,\" and was afraid she'd \"be sent away and locked up somewhere.\"      - Pt denied any decreased PO intake PTA saying she's \"not a big eater\" and she was \"eating as normally as anyone could eat.\" She was unsure of a usual wt.     Malnutrition Risk Screen Score: n/a  Have you recently lost weight without trying? -  Have you eaten poorly because of a decreased appetite? -    CURRENT DIET AND INTAKE:  Diet:  Regular, thin liquids Room Service Appropriate with assist               - Flowsheets show a good appetite and 2 intakes per day of % most days, a couple days without documented intakes.  - She was agreeable to Magic cups being sent up with her lunch and dinner     ANTHROPOMETRICS:  Height: Data Unavailable  Weight:  128 lbs 15.51 oz  Body mass index is 26.95 kg/m .   Weight Status: Overweight BMI 25-29.9  IBW:  44.5 kg ([place])  %IBW: 131%  Weight History:   Wt Readings from Last 10 Encounters:   02/06/25 58.5 kg (128 lb 15.5 oz)   03/11/24 60.5 kg (133 lb 4.8 oz)   09/11/23 59.9 kg (132 lb)   02/21/23 59.4 kg (131 lb)   01/31/22 59.4 kg (131 lb)   01/13/22 59.5 kg (131 lb 1.6 oz)   08/30/21 60.3 kg (133 lb)   12/18/20 62.6 kg (138 lb)   08/31/20 61.2 kg (135 lb)   08/17/20 61.7 kg (136 lb)     LABS:  Labs noted    MALNUTRITION:  Patient does not meet two of the following criteria necessary for diagnosing malnutrition.     % Weight Loss:  Weight loss does not meet criteria for malnutrition   % Intake:  Decreased intake does not meet criteria for malnutrition  Subcutaneous Fat Loss:  Upper arm region mild depletion  Muscle Loss:  Temporal region mild depletion, Clavicle bone region mild depletion, and Dorsal hand region moderate " depletion  Fluid Retention:  None noted    NUTRITION INTERVENTION:  Nutrition Diagnosis:  Predicted suboptimal nutrient intake related to poor vs variable mentation as evidenced by d/w pt, need oral nutrition supplements and meal ordering assistance to help pt meet nutrition needs    Implementation:  Ordered Magic cups BID with lunch and dinner   Nutrition Education:  Per Provider order if indicated    FOLLOW UP/MONITORING:   Will re-evaluate in 7 - 10 days, or sooner, if re-consulted.    Rio Meza MS, RD, LD

## 2025-02-13 NOTE — DISCHARGE INSTRUCTIONS
[facility] Front Door: [phone] for assessments for waivered services.     [facility] Link[facility] Line: [phone]

## 2025-02-13 NOTE — PLAN OF CARE
Goal Outcome Evaluation:      Plan of Care Reviewed With: patient    Overall Patient Progress: improvingOverall Patient Progress: improving    Outcome Evaluation: Continued Confusion, easily redirectable.  Discharge pending, SW following.    Patient vital signs are at baseline: Yes  Patient able to ambulate as they were prior to admission or with assist devices provided by therapies during their stay:  Yes  Patient MUST void prior to discharge:  Yes   Due to Void  Patient able to tolerate oral intake:  Yes  Pain has adequate pain control using Oral analgesics:  Yes  Does patient have an identified :  Yes  Has goal D/C date and time been discussed with patient:  No,  Reason:  Discharge pending, SW following    Dx:Increased Confusion, Dementia  POD#n/a    A&Ox1-2, very Anxious.   CMS Intact.   VSS on RA.   Up with Ax1 GB and walker.   Due to Void.   Left PIV SL.  Regular Diet.  Denies Pain.

## 2025-02-13 NOTE — PROGRESS NOTES
2/1225 [id number]    Diagnosis:N/V(resolved), Increased confusion, Worsening dementia  POD#:NA  Mental Status:A&Ox2, disoriented to time/situation  Activity/dangle: Assist of one, GB/walker  Diet:Reg  Pain: denies  Vargas/Voiding: Incontinent of bladder  Tele/Restraints/Iso: None                         02/LDA:Room air, L PIV  saline locked  D/C Date:Pending, TBD Home vs TCU  Other Info: Verbalized bladder discomfort, ambulated to BR but unable to void                     Bladder scan at 0306H was 584, st cath with 600 ml o/p

## 2025-02-13 NOTE — PROGRESS NOTES
Care Management Follow Up    Length of Stay (days): 7    Expected Discharge Date: 02/13/2025     Concerns to be Addressed: discharge planning     Patient plan of care discussed at interdisciplinary rounds: Yes    Anticipated Discharge Disposition: home with home care      Anticipated Discharge Services:    Anticipated Discharge DME:      Patient/family educated on Medicare website which has current facility and service quality ratings: yes  Education Provided on the Discharge Plan: Yes  Patient/Family in Agreement with the Plan: yes    Referrals Placed by CM/SW: Post Acute Facilities  Private pay costs discussed: Not applicable    Discussed  Partnership in Safe Discharge Planning  document with patient/family: No     Handoff Completed: No, handoff not indicated or clinically appropriate    Additional Information:  Writer met with patient's son Eric and his wife to discuss plan. Daughter in Law has decided that she will quit her job as a  that she was working 1 day/week and her PCA job she's had with a senior woman to be patient's caregiver. She plans to be with patient all day (8-4'sunni)  providing cooking, cleaning, making dinner, managing her medication and assisting where needed. Eric is off from work Sunday and Monday and those days they will go together to coordinate care. They will be looking into getting patient a life alert button and have started thinking through how they could make some changes to patient's home to make it more accessible for her.    Skyla, patient's sister will still be staying there evenings and overnights as she has been so she will have someone there during the night however, they will be working with patient to come up with a plan to prevent her from getting up at night, such as starting to use adult diapers.  Eric said that they will continue to look into memory care options as there is a facility near her that she had always said would be where she'd like to go when  they are no longer able to provide care for her.     Family would like for PT, OT, RN and HHA to be ordered at discharge. Writer discussed looking into [facility] programs for seniors to assist at home and will add this contact to the discharge paperwork for them to look into. Writer reviewed home care referrals sent and [facility] had accepted patient for PT, OT and RN.    They are also requesting that patient's Lorazepam is filled here as at their local pharmacy they cannot pick it up for another 2 weeks and they believe that patient took it all as the bottle was empty when Eric looked at it last.      Next Steps: follow up with provider and send home care referrals     KRZYSZTOF Topete

## 2025-02-13 NOTE — PROGRESS NOTES
[facility]    Medicine Progress Note - Hospitalist Service    Date of Admission:  2/5/2025    Assessment & Plan   Kathy Braswell is a 88 yo F with HTN, anxiety disorder, depression, GERD, and IBS with several weeks of increased confusion and intermittent vomiting. Work-up notable for sodium 131, potassium 3.1, chloride 90, anion gap 18.  Renal function and hepatic panel within normal limits.  CBC within normal limits.  UA mildly abnormal but not clearly infected.  CT head negative for acute process.  CT abdomen pelvis with contrast also negative for acute process, no bowel obstruction.  EKG showed sinus tachycardia but otherwise negative.  Patient empirically started on 1 g ceftriaxone.  Admitted for further evaluation.    Vomiting, resolved  Etiology unclear.  No episodes since admission but has had some nausea.  Per son, may be related to episodes of anxiety.  No abdominal pain to raise concerns for gastritis or PUD. Hemoglobin stable arguing against a UGIB.  CT abdomen negative for bowel obstruction.  Exam negative for abdominal tenderness.  Query underlying esophageal disorder such as achalasia or a Zenker's diverticulum.   - SLP consult, appreciate assistance.  VFSS on 2/7 did not show any aspiration with any consistency.  Currently on regular diet, thin liquids with single sips.  No further needs from SLP perspective.  - antiemetics as needed  -Currently no significant nausea or vomiting in hospital.  - Doubt UTI playing significant role, urine culture with strep anginosus  K.  Completed 3-day course of antibiotic.    Intermittent sinus tachycardia, resolved  HTN  Asymptomatic, nonspecific. Etiology unclear.  Patient has known hypertension for which she is on an ACE inhibitor and amlodipine.  Noted heart rates intermittently into the 130s and were felt to be irregular. Exam notable for soft murmur.  Query runs of AFib/flutter?   - Tele suggests possible sinus dysrhythmia with  PACs; no obvious AFib/Flutter  - Carvedilol 6.25 BID added 2/6 with hold parameters and HR slightly better, down into 90s.  Monitor for tolerance, discontinue if not needed.  - Echo 2/7: Normal EF 60-65%, normal LV, RV, valves.  - Tele discontinued  - Continue PTA amlodipine and irbesartan    Anxiety, Worsening Dementia  Vulnerable adult  Patient has longstanding history of anxiety managed with Prozac and daily Ativan.  It appears that she was recently started on BuSpar.  On exam, her mood and affect are rapidly transversing the spectrum of emotions from happiness to tearful to fear.  It is difficult to pin down what exactly she is feeling.  Moreover, she appears to have advanced dementia, not formally diagnosed but family notes has been progressive for several years.  Lives at home with her 90-year-old sister who helps care for patient but will be unable to do so much longer.  - Psych consulted for medication adjustments  - Continue PTA Prozac, started on Remeron 15 mg nightly, PTA Buspar discontinued.  - Son agrees that patient cannot return home.  Will likely need memory care TCU/LTC. Pending placement. Note that at one point patient's family wanted to bring her home but after further discussion realized they will not be able to provide 24/7 care and that she needs an environment with more resources.  - Discussed CODE STATUS with son and he agrees that his mom should be DNR/DNI.  CODE STATUS updated in chart.      Diet: Regular Diet Adult Thin Liquids (level 0) (upright, single sips, liquid wash PRN)  Room Service  Snacks/Supplements Adult: Magic Cup; With Meals or as per SLP    DVT Prophylaxis: Pneumatic Compression Devices  Vargas Catheter: Not present  Lines: None     Cardiac Monitoring: None  Code Status: No CPR- Do NOT Intubate      Clinically Significant Risk Factors                   # Hypertension: Noted on problem list                       Social Drivers of Health    Physical Activity: Inactive  "(3/11/2024)    Exercise Vital Sign     Days of Exercise per Week: 0 days     Minutes of Exercise per Session: 0 min   Social Connections: Unknown (3/11/2024)    Social Connection and Isolation Panel [NHANES]     Frequency of Social Gatherings with Friends and Family: More than three times a week          Disposition Plan     Medically Ready for Discharge: Ready Now, pending disposition arrangements       Pardeep Foster MD  Hospitalist Service  [facility]  Securely message with [employer] (more info), search \"Bing Gleason\"  ______________________________________________________________________    Interval History   No events overnight. Patient anxious when I saw her but quickly calmed down after being reminded why she is in the hospital.  Awaiting disposition. Updated son over the phone    Physical Exam   Vital Signs: Temp: 98.2  F (36.8  C) Temp src: Oral BP: (!) 142/74 Pulse: 73   Resp: 17 SpO2: 96 % O2 Device: None (Room air)    Weight: 128 lbs 15.51 oz    GENERAL:   Alert, cooperative, answering simple questions  PULMONOLOGY: Clear    CARDIAC: RRR  ABDOMEN: Soft, nontender, nondistended  MUSCULOSKELETAL:  Moving x 4 spontaneously with CMS grossly intact x4.   NEURO: Alert and oriented to self only. Continue to be very repetitive and extremely forgetful.  Moving independently in bed.       Medical Decision Making       27 MINUTES SPENT BY ME on the date of service doing chart review, history, exam, documentation & further activities per the note.      Data         Imaging results reviewed over the past 24 hrs:   No results found for this or any previous visit (from the past 24 hours).      "

## 2025-02-13 NOTE — PROGRESS NOTES
Care Management Follow Up    Length of Stay (days): 7    Expected Discharge Date: 02/13/2025     Concerns to be Addressed: discharge planning     Patient plan of care discussed at interdisciplinary rounds: Yes    Anticipated Discharge Disposition: Home, Home Care      Anticipated Discharge Services:    Anticipated Discharge DME:      Patient/family educated on Medicare website which has current facility and service quality ratings: yes  Education Provided on the Discharge Plan: Yes  Patient/Family in Agreement with the Plan: yes    Referrals Placed by CM/SW: Post Acute Facilities  Private pay costs discussed: Not applicable    Discussed  Partnership in Safe Discharge Planning  document with patient/family: No     Handoff Completed: No, handoff not indicated or clinically appropriate    Additional Information:  Writer received a call from patient's son Eric who is coming back up to the hospital with his wife and patient's sister Skyla. Writer met with these 3 in the [facility]. Eric said that he was called by provider and after speaking to Skyla they now don't think that they can provide the level of care that patient needs. Skyla stated that she does not feel that she can provide care to patient even over nights. She said that patient gets up 3 times per night to go to the bathroom which she is finding difficult to manage and noted that she has a hard time physically assisting patient. She said that she feels it would be in patient's best interest to go to a nursing facility vs going home.    Writer noted that per chart, [facility] in [place] appeared to have a bed on Monday in the LTC with a $10,000 deposit. Eric would like writer to call to verify that is still an option and to see if TCU would be an option first to see if patient could get a little stronger. Writer called and spoke to Malena. She said that they will need to re-evaluate on Monday to verify that they can meet her needs and she mentioned that  "the roommate situation may change and that is what they need to determine first. She said that the  [facility] may have an opening and transferred the call to Elena. Elena confirmed that they have an opening but would like to review clinical information to see if they feel that she would be a good fit. She'd like information faxed to [phone]. They also accept Elderly Waiver so if she runs out of money, they will assist her in applying for that.     Writer updated family of the above conversation. Eric was tearful during this conversation but agrees that they may not be able to provide the level of care. He discussed \"trying it out\" for a month but his wife had reservations about this plan. Writer asked about having her move in with them and they said that they live in a multi level home and stopped having her over for holidays years ago because she can't navigate the steps. Eric stated that there is no living will, no healthcare directive or anything in writing regarding her wishes. He has questions about processes so writer suggested he reach out to [employer] or locate an elder law  to assist with these questions.     Writer updated provider and faxed clinical to [facility], attention Elena. Writer will follow up with her tomorrow and Malena on Monday.     Next Steps: follow for discharge planning.    KRZYSZTOF Topete      "

## 2025-02-14 ENCOUNTER — APPOINTMENT (OUTPATIENT)
Dept: PHYSICAL THERAPY | Facility: CLINIC | Age: 89
End: 2025-02-14
Payer: COMMERCIAL

## 2025-02-14 PROCEDURE — 97530 THERAPEUTIC ACTIVITIES: CPT | Mod: GP | Performed by: PHYSICAL THERAPIST

## 2025-02-14 PROCEDURE — 97116 GAIT TRAINING THERAPY: CPT | Mod: GP | Performed by: PHYSICAL THERAPIST

## 2025-02-14 PROCEDURE — 250N000013 HC RX MED GY IP 250 OP 250 PS 637

## 2025-02-14 PROCEDURE — 250N000013 HC RX MED GY IP 250 OP 250 PS 637: Performed by: INTERNAL MEDICINE

## 2025-02-14 PROCEDURE — 99232 SBSQ HOSP IP/OBS MODERATE 35: CPT | Performed by: STUDENT IN AN ORGANIZED HEALTH CARE EDUCATION/TRAINING PROGRAM

## 2025-02-14 PROCEDURE — 250N000013 HC RX MED GY IP 250 OP 250 PS 637: Performed by: PHYSICIAN ASSISTANT

## 2025-02-14 PROCEDURE — 120N000001 HC R&B MED SURG/OB

## 2025-02-14 PROCEDURE — 97110 THERAPEUTIC EXERCISES: CPT | Mod: GP | Performed by: PHYSICAL THERAPIST

## 2025-02-14 RX ADMIN — PANTOPRAZOLE SODIUM 40 MG: 40 TABLET, DELAYED RELEASE ORAL at 06:38

## 2025-02-14 RX ADMIN — MIRTAZAPINE 15 MG: 15 TABLET, FILM COATED ORAL at 21:22

## 2025-02-14 RX ADMIN — CARVEDILOL 6.25 MG: 3.12 TABLET, FILM COATED ORAL at 08:16

## 2025-02-14 RX ADMIN — FLUOXETINE HYDROCHLORIDE 20 MG: 20 CAPSULE ORAL at 08:16

## 2025-02-14 RX ADMIN — AMLODIPINE BESYLATE 5 MG: 5 TABLET ORAL at 08:16

## 2025-02-14 RX ADMIN — IRBESARTAN 300 MG: 150 TABLET ORAL at 08:15

## 2025-02-14 RX ADMIN — CARVEDILOL 6.25 MG: 3.12 TABLET, FILM COATED ORAL at 17:37

## 2025-02-14 ASSESSMENT — ACTIVITIES OF DAILY LIVING (ADL)
ADLS_ACUITY_SCORE: 68
ADLS_ACUITY_SCORE: 71
ADLS_ACUITY_SCORE: 68
ADLS_ACUITY_SCORE: 71
ADLS_ACUITY_SCORE: 68
ADLS_ACUITY_SCORE: 71
ADLS_ACUITY_SCORE: 68
ADLS_ACUITY_SCORE: 71
ADLS_ACUITY_SCORE: 71

## 2025-02-14 NOTE — PROGRESS NOTES
[facility]    Medicine Progress Note - Hospitalist Service    Date of Admission:  2/5/2025    Assessment & Plan   Kathy Braswell is a 88 yo F with HTN, anxiety disorder, depression, GERD, and IBS with several weeks of increased confusion and intermittent vomiting. Work-up notable for sodium 131, potassium 3.1, chloride 90, anion gap 18.  Renal function and hepatic panel within normal limits.  CBC within normal limits.  UA mildly abnormal but not clearly infected.  CT head negative for acute process.  CT abdomen pelvis with contrast also negative for acute process, no bowel obstruction.  EKG showed sinus tachycardia but otherwise negative.  Patient empirically started on 1 g ceftriaxone.  Admitted for further evaluation.    Vomiting, resolved  Etiology unclear.  No episodes since admission but has had some nausea.  Per son, may be related to episodes of anxiety.  No abdominal pain to raise concerns for gastritis or PUD. Hemoglobin stable arguing against a UGIB.  CT abdomen negative for bowel obstruction.  Exam negative for abdominal tenderness.  Query underlying esophageal disorder such as achalasia or a Zenker's diverticulum.   - SLP consult, appreciate assistance.  VFSS on 2/7 did not show any aspiration with any consistency.  Currently on regular diet, thin liquids with single sips.  No further needs from SLP perspective.  - antiemetics as needed  -Currently no significant nausea or vomiting in hospital.  - Doubt UTI playing significant role, urine culture with strep anginosus  K.  Completed 3-day course of antibiotic and no need for further antibiotics.    Intermittent sinus tachycardia, resolved  HTN  Asymptomatic, nonspecific. Etiology unclear.  Patient has known hypertension for which she is on an ACE inhibitor and amlodipine.  Noted heart rates intermittently into the 130s and were felt to be irregular. Exam notable for soft murmur.  - Tele suggests possible sinus dysrhythmia  with PACs; no obvious AFib/Flutter  - Carvedilol 6.25 BID added 2/6 with hold parameters. Tolerating well so will continue for now.   - Echo 2/7: Normal EF 60-65%, normal LV, RV, valves.  - Continue PTA amlodipine and irbesartan    Anxiety, Worsening Dementia  Vulnerable adult  Patient has longstanding history of anxiety managed with Prozac and daily Ativan.  It appears that she was recently started on BuSpar.  On exam, her mood and affect are rapidly transversing the spectrum of emotions from happiness to tearful to fear.  It is difficult to pin down what exactly she is feeling.  Moreover, she appears to have advanced dementia, not formally diagnosed but family notes has been progressive for several years.  Lives at home with her 90-year-old sister who helps care for patient but will be unable to do so much longer.  - Psych consulted for medication adjustments  - Continue PTA Prozac, started on Remeron 15 mg nightly, PTA Buspar discontinued.  - Son agrees that patient cannot return home.  Will likely need memory care TCU/LTC. Pending placement. Note that at one point patient's family wanted to bring her home but after further discussion realized they will not be able to provide 24/7 care and that she needs an environment with more resources. [facility] has a bed on Monday in the LTC available so current plan is for patient to discharge there early next week, likely Monday.  - Discussed CODE STATUS with son and he agrees that his mom should be DNR/DNI.  CODE STATUS updated in chart.      Diet: Regular Diet Adult Thin Liquids (level 0) (upright, single sips, liquid wash PRN)  Room Service  Snacks/Supplements Adult: Magic Cup; With Meals or as per SLP    DVT Prophylaxis: Pneumatic Compression Devices  Vargas Catheter: Not present  Lines: None     Cardiac Monitoring: None  Code Status: No CPR- Do NOT Intubate      Clinically Significant Risk Factors                   # Hypertension: Noted on problem list      "                  Social Drivers of Health    Physical Activity: Inactive (3/11/2024)    Exercise Vital Sign     Days of Exercise per Week: 0 days     Minutes of Exercise per Session: 0 min   Social Connections: Unknown (3/11/2024)    Social Connection and Isolation Panel [NHANES]     Frequency of Social Gatherings with Friends and Family: More than three times a week          Disposition Plan     Medically Ready for Discharge: Ready Now, likely going Monday to LTC       Pardeep Foster MD  Hospitalist Service  [facility]  Securely message with [employer] (more info), search \"Bing Gleason\"  ______________________________________________________________________    Interval History   No events overnight. Quite anxious about going to facility but does not have clear understanding of why she is here or what she is in the hospital for. Discussed care over the phone with patient's son, Eric.    Physical Exam   Vital Signs: Temp: 98.7  F (37.1  C) Temp src: Oral BP: 134/78 Pulse: 70   Resp: 18 SpO2: 95 % O2 Device: None (Room air)    Weight: 128 lbs 15.51 oz    GENERAL:   Alert, cooperative, answering simple questions  PULMONOLOGY: Clear    CARDIAC: RRR  ABDOMEN: Soft, nontender, nondistended  MUSCULOSKELETAL:  Moving x 4 spontaneously with CMS grossly intact x4.   NEURO: Alert and oriented to self only. Continue to be very repetitive and extremely forgetful.  Moving independently in bed.       Medical Decision Making       29 MINUTES SPENT BY ME on the date of service doing chart review, history, exam, documentation & further activities per the note.      Data         Imaging results reviewed over the past 24 hrs:   No results found for this or any previous visit (from the past 24 hours).      "

## 2025-02-14 NOTE — PLAN OF CARE
Goal Outcome Evaluation:    Pt is A&O to self, Up with assist of 1 with GB and walker, VSS on RA. Denied pain during the shift. Bladder scanned 122 ml at 2354 Am and 661 ml at 0530 AM, Straight cath 750 ml  , Regular diet, PIV SL.Pending TCU placement.

## 2025-02-14 NOTE — PLAN OF CARE
Goal Outcome Evaluation:      Trauma/Ortho/Medical (Choose one)  Medical.    Diagnosis: Confusion and Dementia.  POD#: None.  Mental Status: A&OX1 disoriented to time, Place, and situation.  Activity/dangle Up with assist of 1 gait belt and walker.  Diet: Regular diet.  Pain: Denies pain on this shift.  Vargas/Voiding: Voiding in bathroom with assist of 1.  Tele/Restraints/Iso: None.  02/LDA: ADRI NICKERSON.  D/C Date: Pending to TCU.  Other Info : Pt VSS on room air, CMS intact. Bladder scan 554 ml and straight cath 600 ml.

## 2025-02-15 PROCEDURE — 250N000013 HC RX MED GY IP 250 OP 250 PS 637: Performed by: INTERNAL MEDICINE

## 2025-02-15 PROCEDURE — 250N000013 HC RX MED GY IP 250 OP 250 PS 637

## 2025-02-15 PROCEDURE — 120N000001 HC R&B MED SURG/OB

## 2025-02-15 PROCEDURE — 250N000013 HC RX MED GY IP 250 OP 250 PS 637: Performed by: PHYSICIAN ASSISTANT

## 2025-02-15 PROCEDURE — 99232 SBSQ HOSP IP/OBS MODERATE 35: CPT | Performed by: STUDENT IN AN ORGANIZED HEALTH CARE EDUCATION/TRAINING PROGRAM

## 2025-02-15 PROCEDURE — 250N000013 HC RX MED GY IP 250 OP 250 PS 637: Performed by: HOSPITALIST

## 2025-02-15 RX ADMIN — AMLODIPINE BESYLATE 5 MG: 5 TABLET ORAL at 08:03

## 2025-02-15 RX ADMIN — QUETIAPINE 12.5 MG: 25 TABLET, FILM COATED ORAL at 14:20

## 2025-02-15 RX ADMIN — FLUOXETINE HYDROCHLORIDE 20 MG: 20 CAPSULE ORAL at 08:03

## 2025-02-15 RX ADMIN — MIRTAZAPINE 15 MG: 15 TABLET, FILM COATED ORAL at 20:25

## 2025-02-15 RX ADMIN — CARVEDILOL 6.25 MG: 3.12 TABLET, FILM COATED ORAL at 17:22

## 2025-02-15 RX ADMIN — PANTOPRAZOLE SODIUM 40 MG: 40 TABLET, DELAYED RELEASE ORAL at 07:00

## 2025-02-15 RX ADMIN — QUETIAPINE 12.5 MG: 25 TABLET, FILM COATED ORAL at 20:36

## 2025-02-15 RX ADMIN — CARVEDILOL 6.25 MG: 3.12 TABLET, FILM COATED ORAL at 08:03

## 2025-02-15 RX ADMIN — IRBESARTAN 300 MG: 150 TABLET ORAL at 08:03

## 2025-02-15 ASSESSMENT — ACTIVITIES OF DAILY LIVING (ADL)
ADLS_ACUITY_SCORE: 64
ADLS_ACUITY_SCORE: 68
ADLS_ACUITY_SCORE: 64
ADLS_ACUITY_SCORE: 68
ADLS_ACUITY_SCORE: 64
ADLS_ACUITY_SCORE: 68
ADLS_ACUITY_SCORE: 64
ADLS_ACUITY_SCORE: 68
ADLS_ACUITY_SCORE: 64
ADLS_ACUITY_SCORE: 68
ADLS_ACUITY_SCORE: 64
ADLS_ACUITY_SCORE: 68
ADLS_ACUITY_SCORE: 64

## 2025-02-15 NOTE — PROGRESS NOTES
[facility]    Medicine Progress Note - Hospitalist Service    Date of Admission:  2/5/2025    Assessment & Plan   Kathy Braswell is a 88 yo F with HTN, anxiety disorder, depression, GERD, and IBS with several weeks of increased confusion and intermittent vomiting. Work-up notable for sodium 131, potassium 3.1, chloride 90, anion gap 18.  Renal function and hepatic panel within normal limits.  CBC within normal limits.  UA mildly abnormal but not clearly infected.  CT head negative for acute process.  CT abdomen pelvis with contrast also negative for acute process, no bowel obstruction.  EKG showed sinus tachycardia but otherwise negative.  Patient empirically started on 1 g ceftriaxone.  Admitted for further evaluation.    Vomiting, resolved  Etiology unclear.  No episodes since admission but has had some nausea.  Per son, may be related to episodes of anxiety.  No abdominal pain to raise concerns for gastritis or PUD. Hemoglobin stable arguing against a UGIB.  CT abdomen negative for bowel obstruction.  Exam negative for abdominal tenderness.  Query underlying esophageal disorder such as achalasia or a Zenker's diverticulum.   - SLP consult, appreciate assistance.  VFSS on 2/7 did not show any aspiration with any consistency.  Currently on regular diet, thin liquids with single sips.  No further needs from SLP perspective.  - antiemetics as needed  -Currently no significant nausea or vomiting in hospital.  - Doubt UTI playing significant role, urine culture with strep anginosus  K.  Completed 3-day course of antibiotic and no need for further antibiotics.    Intermittent sinus tachycardia, resolved  HTN  Asymptomatic, nonspecific. Etiology unclear.  Patient has known hypertension for which she is on an ACE inhibitor and amlodipine.  Noted heart rates intermittently into the 130s and were felt to be irregular. Exam notable for soft murmur.  - Tele suggests possible sinus dysrhythmia  with PACs; no obvious AFib/Flutter  - Carvedilol 6.25 BID added 2/6 with hold parameters. Tolerating well so will continue for now.   - Echo 2/7: Normal EF 60-65%, normal LV, RV, valves.  - Continue PTA amlodipine and irbesartan    Urinary retention  Per patient's son, Eric, patient has had some mild issues with urinary retention in the distant past but she has never needed medication or a Vargas catheter for this.  Nursing needing to straight cath her multiple times this hospitalization despite her being ambulatory and walking around.  She goes to the restroom to try to urinate but is not unable to get much out.  -SIC for bladder scan greater than 500 ml  -Urology consult to for consideration of any other management strategies besides placing Vargas catheter.  Vargas catheter would be last resort in this patient as it likely would only lead to worsening of her mental status in the setting of severe dementia.    Anxiety, Worsening Dementia  Vulnerable adult  Patient has longstanding history of anxiety managed with Prozac and daily Ativan.  It appears that she was recently started on BuSpar.  On exam, her mood and affect are rapidly transversing the spectrum of emotions from happiness to tearful to fear.  It is difficult to pin down what exactly she is feeling.  Moreover, she appears to have advanced dementia, not formally diagnosed but family notes has been progressive for several years.  Lives at home with her 90-year-old sister who helps care for patient but will be unable to do so much longer.  - Psych consulted for medication adjustments  - Continue PTA Prozac, started on Remeron 15 mg nightly, PTA Buspar discontinued.  - Son agrees that patient cannot return home.  Will likely need memory care TCU/LTC. Pending placement. Note that at one point patient's family wanted to bring her home but after further discussion realized they will not be able to provide 24/7 care and that she needs an environment with more  "resources. [facility] in [place] has a bed in the LTC available so current plan is for patient to discharge there early next week, possibly Monday.  - Discussed CODE STATUS with son and he agrees that his mom should be DNR/DNI.  CODE STATUS updated in chart.      Diet: Regular Diet Adult Thin Liquids (level 0) (upright, single sips, liquid wash PRN)  Room Service  Snacks/Supplements Adult: Magic Cup; With Meals or as per SLP    DVT Prophylaxis: Pneumatic Compression Devices  Vargas Catheter: Not present  Lines: None     Cardiac Monitoring: None  Code Status: No CPR- Do NOT Intubate      Clinically Significant Risk Factors                   # Hypertension: Noted on problem list                       Social Drivers of Health    Physical Activity: Inactive (3/11/2024)    Exercise Vital Sign     Days of Exercise per Week: 0 days     Minutes of Exercise per Session: 0 min   Social Connections: Unknown (3/11/2024)    Social Connection and Isolation Panel [NHANES]     Frequency of Social Gatherings with Friends and Family: More than three times a week          Disposition Plan     Medically Ready for Discharge: Ready Now, going early next week to LTC       Pardeep Foster MD  Hospitalist Service  [facility]  Securely message with [employer] (more info), search \"Bing Gleason\"  ______________________________________________________________________    Interval History   No events overnight.  Continuing to have issues with urinary retention and requiring SIC.  Patient states she sometimes feels like she needs to urinate but is unable to put much out.  She denies any back pain or fecal incontinence.  Discussed with the patient and Eric plan for urology consult.  Discussed with bedside nurse that we should continue to straight cath for now while awaiting urology recommendations as placing a Vargas catheter would likely be more detrimental to her mental status.    Physical Exam   Vital Signs: Temp: " 98.5  F (36.9  C) Temp src: Oral BP: (!) 149/70 Pulse: 69   Resp: 16 SpO2: 94 % O2 Device: None (Room air)    Weight: 128 lbs 15.51 oz  GENERAL:   Alert, cooperative, answering simple questions  PULMONOLOGY: Clear    CARDIAC: RRR  ABDOMEN: Soft, nontender, nondistended  MUSCULOSKELETAL:  Moving x 4 spontaneously with CMS grossly intact x4.   NEURO: Alert and oriented to self only. Continue to be very repetitive and extremely forgetful.  Moving independently in bed.       Medical Decision Making       29 MINUTES SPENT BY ME on the date of service doing chart review, history, exam, documentation & further activities per the note.      Data         Imaging results reviewed over the past 24 hrs:   No results found for this or any previous visit (from the past 24 hours).

## 2025-02-15 NOTE — PLAN OF CARE
Goal Outcome Evaluation:  Diagnosis: Confusion and Dementia.  POD#: None.  Mental Status: A&OX1 disoriented to time, Place, and situation.  Activity/dangle Up with assist of 1 gait belt and walker.  Diet: Regular diet.  Pain: Denies pain on this shift.  Vargas/Voiding: Voiding in bathroom with assist of 1.  Tele/Restraints/Iso: None.  02/LDA: PIV SL.  D/C Date: Pending to TCU.  Other Info : Pt VSS on room air, CMS intact. Bladder scan  250 ml .MD aware Pt is being straight cath more than 3 times  before..,

## 2025-02-15 NOTE — PROGRESS NOTES
UROLOGY CHART CHECK    I received a consult today for Kathy Braswell, an 89 year-old patient with dementia, admitted for intermittent vomiting, found to have multiple electrolyte abnormalities. She has required CIC several times this hospitalization and the primary team wonders if she will need a chronic bhardwaj. She completed a 3 day course of antibiotics on admission given + Urine culture (50-100k strep anginosus).    It is impossible to determine whether or not her urinary retention is permanent at this point.     In order to give her the best possible chance to void, she should be ambulating, anticholinergic and narcotic medications should be kept at a minimum, and constipation should be treated. Her bladder can either be managed with CIC or an indwelling bhardwaj catheter. If bhardwaj catheter is chosen, a trial of void can be attempted once she is back to her baseline status of health.    A formal consult will be completed within 24 hours.    Elisha Ash MD  Reconstructive Urology  [facility]

## 2025-02-15 NOTE — PLAN OF CARE
Goal Outcome Evaluation:                      Diagnosis: confusion and dementia   Orientation:alert to self, disoriented to time, place and situation   Vitals/Tele: VSS on RA  Pain management: denies   IV Access/drains: IV saline lock   Diet: regular   Mobility: up with with assist of 1 gait belt and walker   GI/: voiding in the bathroom   Wound/Skin:CMS intact, scattered bruise   Consults:  Discharge Plan: pending       See Flow sheets for assessment

## 2025-02-16 PROCEDURE — 120N000001 HC R&B MED SURG/OB

## 2025-02-16 PROCEDURE — 99232 SBSQ HOSP IP/OBS MODERATE 35: CPT | Performed by: STUDENT IN AN ORGANIZED HEALTH CARE EDUCATION/TRAINING PROGRAM

## 2025-02-16 PROCEDURE — 99221 1ST HOSP IP/OBS SF/LOW 40: CPT | Performed by: STUDENT IN AN ORGANIZED HEALTH CARE EDUCATION/TRAINING PROGRAM

## 2025-02-16 PROCEDURE — 250N000013 HC RX MED GY IP 250 OP 250 PS 637: Performed by: INTERNAL MEDICINE

## 2025-02-16 PROCEDURE — 250N000013 HC RX MED GY IP 250 OP 250 PS 637: Performed by: PHYSICIAN ASSISTANT

## 2025-02-16 PROCEDURE — 250N000013 HC RX MED GY IP 250 OP 250 PS 637

## 2025-02-16 PROCEDURE — 250N000013 HC RX MED GY IP 250 OP 250 PS 637: Performed by: STUDENT IN AN ORGANIZED HEALTH CARE EDUCATION/TRAINING PROGRAM

## 2025-02-16 RX ORDER — SENNOSIDES 8.6 MG
2 TABLET ORAL DAILY
Status: DISCONTINUED | OUTPATIENT
Start: 2025-02-16 | End: 2025-02-19 | Stop reason: HOSPADM

## 2025-02-16 RX ORDER — POLYETHYLENE GLYCOL 3350 17 G/17G
17 POWDER, FOR SOLUTION ORAL DAILY
Status: DISCONTINUED | OUTPATIENT
Start: 2025-02-16 | End: 2025-02-19 | Stop reason: HOSPADM

## 2025-02-16 RX ADMIN — POLYETHYLENE GLYCOL 3350 17 G: 17 POWDER, FOR SOLUTION ORAL at 08:40

## 2025-02-16 RX ADMIN — PANTOPRAZOLE SODIUM 40 MG: 40 TABLET, DELAYED RELEASE ORAL at 06:30

## 2025-02-16 RX ADMIN — FLUOXETINE HYDROCHLORIDE 20 MG: 20 CAPSULE ORAL at 08:40

## 2025-02-16 RX ADMIN — MIRTAZAPINE 15 MG: 15 TABLET, FILM COATED ORAL at 20:36

## 2025-02-16 RX ADMIN — CARVEDILOL 6.25 MG: 3.12 TABLET, FILM COATED ORAL at 08:41

## 2025-02-16 RX ADMIN — AMLODIPINE BESYLATE 5 MG: 5 TABLET ORAL at 08:41

## 2025-02-16 RX ADMIN — SENNOSIDES 2 TABLET: 8.6 TABLET ORAL at 08:41

## 2025-02-16 RX ADMIN — IRBESARTAN 300 MG: 150 TABLET ORAL at 08:41

## 2025-02-16 RX ADMIN — CARVEDILOL 6.25 MG: 3.12 TABLET, FILM COATED ORAL at 18:11

## 2025-02-16 ASSESSMENT — ACTIVITIES OF DAILY LIVING (ADL)
ADLS_ACUITY_SCORE: 64
ADLS_ACUITY_SCORE: 62
ADLS_ACUITY_SCORE: 64
ADLS_ACUITY_SCORE: 62
ADLS_ACUITY_SCORE: 68
ADLS_ACUITY_SCORE: 62
ADLS_ACUITY_SCORE: 68
ADLS_ACUITY_SCORE: 64
ADLS_ACUITY_SCORE: 68
ADLS_ACUITY_SCORE: 64
ADLS_ACUITY_SCORE: 64
ADLS_ACUITY_SCORE: 68
ADLS_ACUITY_SCORE: 64
ADLS_ACUITY_SCORE: 68
ADLS_ACUITY_SCORE: 62
ADLS_ACUITY_SCORE: 64
ADLS_ACUITY_SCORE: 68
ADLS_ACUITY_SCORE: 68
ADLS_ACUITY_SCORE: 64
ADLS_ACUITY_SCORE: 62
ADLS_ACUITY_SCORE: 68

## 2025-02-16 NOTE — PLAN OF CARE
Goal Outcome Evaluation:         Summary: 2-16-25 [id number]---[id number]  Diagnosis: confusion and dementia   Orientation:alert to self, disoriented to time, place and situation   Vitals/Tele: VSS on RA  Pain management: denies   IV Access/drains: no iv access pt removed, MD aware   Diet: regular   Mobility: up with with assist of 1 gait belt and walker   GI/: voiding in the bathroom   Wound/Skin:CMS intact, scattered bruise   Consults: urology   Discharge Plan: pending    Other: pt very anxious this shift, pt states she wants to go home, and people coming to get her and lock her down. PRN Seroquel given very restless at 2100.     See Flow sheets for assessment

## 2025-02-16 NOTE — PROGRESS NOTES
[facility]    Medicine Progress Note - Hospitalist Service    Date of Admission:  2/5/2025    Assessment & Plan   Kathy Braswell is a 88 yo F with HTN, anxiety disorder, depression, GERD, and IBS with several weeks of increased confusion and intermittent vomiting. Work-up notable for sodium 131, potassium 3.1, chloride 90, anion gap 18.  Renal function and hepatic panel within normal limits.  CBC within normal limits.  UA mildly abnormal but not clearly infected.  CT head negative for acute process.  CT abdomen pelvis with contrast also negative for acute process, no bowel obstruction.  EKG showed sinus tachycardia but otherwise negative.  Patient empirically started on 1 g ceftriaxone.  Admitted for further evaluation.    Vomiting, resolved  Etiology unclear.  No episodes since admission but has had some nausea.  Per son, may be related to episodes of anxiety.  No abdominal pain to raise concerns for gastritis or PUD. Hemoglobin stable arguing against a UGIB.  CT abdomen negative for bowel obstruction.  Exam negative for abdominal tenderness.  Query underlying esophageal disorder such as achalasia or a Zenker's diverticulum.   - SLP consult, appreciate assistance.  VFSS on 2/7 did not show any aspiration with any consistency.  Currently on regular diet, thin liquids with single sips.  No further needs from SLP perspective.  -antiemetics as needed  -Currently no significant nausea or vomiting in hospital.  -Doubt UTI played significant role, urine culture with strep anginosus  K.  Completed 3-day course of antibiotic and no need for further antibiotics.    Intermittent sinus tachycardia, resolved  HTN  Asymptomatic, nonspecific. Etiology unclear.  Patient has known hypertension for which she is on an ACE inhibitor and amlodipine.  Noted heart rates intermittently into the 130s and were felt to be irregular. Exam notable for soft murmur.  - Tele suggests possible sinus dysrhythmia with  PACs; no obvious AFib/Flutter  - Carvedilol 6.25 BID added 2/6 with hold parameters. Tolerating well so will continue for now.   - Echo 2/7: Normal EF 60-65%, normal LV, RV, valves.  - Continue PTA amlodipine and irbesartan    Urinary retention  Per patient's son, Eric, patient has had some mild issues with urinary retention in the distant past but she has never needed medication or a Vargas catheter for this.  Nursing needing to straight cath her multiple times this hospitalization despite her being ambulatory and walking around.  She goes to the restroom to try to urinate but is not unable to get much out.  -Urology consulted for consideration of any other management strategies besides placing Vargas catheter. They had discussion with patient's son, Eric, about the urinary retention and the different options available. After discussion, Eric decided it would be best to stop checking bladder scans and only catheterize patient if she is uncomfortably. This is with consideration of how catheterization could worsen her mental status in the setting of worsening dementia.    Anxiety, Worsening Dementia  Vulnerable adult  Patient has longstanding history of anxiety managed with Prozac and daily Ativan.  It appears that she was recently started on BuSpar.  On exam, her mood and affect are rapidly transversing the spectrum of emotions from happiness to tearful to fear.  It is difficult to pin down what exactly she is feeling.  Moreover, she appears to have advanced dementia, not formally diagnosed but family notes has been progressive for several years.  Lives at home with her 90-year-old sister who helps care for patient but will be unable to do so much longer.  - Psych consulted for medication adjustments  - Continue PTA Prozac, started on Remeron 15 mg nightly, PTA Buspar discontinued.  - Son agrees that patient cannot return home.  Will likely need memory care TCU/LTC. Pending placement. Note that at one point  "patient's family wanted to bring her home but after further discussion realized they will not be able to provide 24/7 care and that she needs an environment with more resources. 3 [facility] in [place] has a bed in the LTC available so current plan is for patient to discharge there early next week, possibly Monday.  - Discussed CODE STATUS with son and he agrees that his mom should be DNR/DNI.  CODE STATUS updated in chart.      Diet: Regular Diet Adult Thin Liquids (level 0) (upright, single sips, liquid wash PRN)  Room Service  Snacks/Supplements Adult: Magic Cup; With Meals or as per SLP    DVT Prophylaxis: Pneumatic Compression Devices  Vargas Catheter: Not present  Lines: None     Cardiac Monitoring: None  Code Status: No CPR- Do NOT Intubate      Clinically Significant Risk Factors                   # Hypertension: Noted on problem list                       Social Drivers of Health    Physical Activity: Inactive (3/11/2024)    Exercise Vital Sign     Days of Exercise per Week: 0 days     Minutes of Exercise per Session: 0 min   Social Connections: Unknown (3/11/2024)    Social Connection and Isolation Panel [NHANES]     Frequency of Social Gatherings with Friends and Family: More than three times a week          Disposition Plan     Medically Ready for Discharge: Ready Now pending placement to LTC       Pardeep Foster MD  Hospitalist Service  [facility]  Securely message with [email or web address] (more info), search \"Bing Gleason\"  ______________________________________________________________________    Interval History   NAEO. NNR. Patient is feeling ok today but continues to be anxious about the overall situation and requires reorientation multiple times regarding why she is in the hospital and what the concerns are. She does not have any medical complaints or concerns at this time. We are still waiting on LTC placement.    Physical Exam   Vital Signs: Temp: 97.8  F (36.6  C) Temp src: " Oral BP: 119/60 Pulse: 68   Resp: 16 SpO2: 98 % O2 Device: None (Room air)    Weight: 128 lbs 15.51 oz  GENERAL:   Alert, cooperative, answering simple questions  MUSCULOSKELETAL:  Moving x 4 spontaneously   NEURO: Alert and oriented to self only. Continue to be very repetitive and extremely forgetful.  Moving independently in bed.       Medical Decision Making       32 MINUTES SPENT BY ME on the date of service doing chart review, history, exam, documentation & further activities per the note.      Data         Imaging results reviewed over the past 24 hrs:   No results found for this or any previous visit (from the past 24 hours).

## 2025-02-16 NOTE — PLAN OF CARE
Goal Outcome Evaluation:         Diagnosis: Confusion and Dementia.  POD#: None.  Mental Status: A&OX1 disoriented to time, Place, and situation.  Activity/dangle Up with assist of 1 gait belt and walker.  Diet: Regular diet.  Pain: Denies pain on this shift.  Vargas/Voiding: Voiding in bathroom with assist of 1. Straight cath once 650 ml out.  Tele/Restraints/Iso: None.  02/LDA: PIV SL.  D/C Date: Pending to TCU.  Other Info : Pt VSS on room air, CMS intact. PRN Seroquel  given once due to more anxiety .Urology consulted due to pt is retaining more urine need straight cath at times.

## 2025-02-16 NOTE — PLAN OF CARE
Goal Outcome Evaluation:      Plan of Care Reviewed With: patient    Overall Patient Progress: improvingOverall Patient Progress: improving     Alert to self only. VSS, RA. CMS intact. Denies pain. Please see urology note regarding voiding, last straight cath at 6 am. Assist of 1 with walker.

## 2025-02-16 NOTE — CONSULTS
Urology Consult    Name: Kathy Braswell    MRN: [id number]   YOB: 1935               Chief Complaint:   Urinary retention    History is obtained from the patient and chart review          History of Present Illness:   Kathy Braswell is a 89 year old female with a history of advanced dementia, admitted to the hospital for intermittent vomiting (now resolved), who was found to be retaining urine, requiring intermittent CIC. The concern is that an indwelling bhardwaj catheter may worsen her confusion. Urology was consulted for further recommendations    On admission she was treated with a 3 day course of antibiotics for 50-100k Strep anginosus urine culture. She was not having any specific urinary symptoms at this time. She has a history of urinary retention in the past but has not required intervention.    Her current PVR threshold for CIC while hospitalized is 500 mL. It appears that she was catheterized x1 yesterday for 650 mL.     Cr on admission was 0.51. CT A/P did demonstrate a markedly distended bladder.     On review of her medications, she is on Mirtazapine which has the potential to contribute to urinary retention.          Past Medical History:     Past Medical History:   Diagnosis Date    Allergic rhinitis, cause unspecified     Closed compression fracture of L1 lumbar vertebral body 05/01/2015    after a fall    Esophageal reflux     Essential hypertension, benign     Generalized osteoarthrosis, unspecified site     HX COLON POLYP     Insomnia, unspecified     Irritable bowel syndrome 02/28/2015    Labyrinthitis, unspecified     Mild major depression     Nasal/sinus dis NEC     OSTEOPENIA     Pubic ramus fracture (H) 07/01/2014    Nondisplaced right superior and inferior pubic ramus fractures    Unspecified closed fracture of ankle             Past Surgical History:     Past Surgical History:   Procedure Laterality Date    PHACOEMULSIFICATION CLEAR CORNEA WITH STANDARD INTRAOCULAR LENS IMPLANT   5/15/2013    Procedure: PHACOEMULSIFICATION CLEAR CORNEA WITH STANDARD INTRAOCULAR LENS IMPLANT;  RIGHT PHACOEMULSIFICATION CLEAR CORNEA WITH STANDARD INTRAOCULAR LENS IMPLANT  ;  Surgeon: Soy Hall MD;  [facility]: [facility]    PHACOEMULSIFICATION CLEAR CORNEA WITH STANDARD INTRAOCULAR LENS IMPLANT  10/30/2013    Procedure: PHACOEMULSIFICATION CLEAR CORNEA WITH STANDARD INTRAOCULAR LENS IMPLANT;  LEFT PHACOEMULSIFICATION CLEAR CORNEA WITH STANDARD INTRAOCULAR LENS IMPLANT;  Surgeon: Soy Hall MD;  Location:  [facility]    Z NONSPECIFIC PROCEDURE      excision of cyst     [facility] NONSPECIFIC PROCEDURE      D&C x 2 after miscarriage and menometrorrhagia    Z NONSPECIFIC PROCEDURE  2/99    colonoscopy            Social History:     Social History     Tobacco Use    Smoking status: Never    Smokeless tobacco: Never   Substance Use Topics    Alcohol use: Yes     Alcohol/week: 0.0 standard drinks of alcohol     Comment: seldom            Family History:     Family History   Problem Relation Age of Onset    Hypertension Mother     Prostate Cancer Father             Allergies:     Allergies   Allergen Reactions    Citalopram      Nausea      Duloxetine Hcl      dizziness    Lisinopril      cough    Metoprolol Succinate      toprol xl results in nausea, upper abd. pain    Morphine And Codeine      vomiting    Trazodone Hcl      Caused dizziness and nightmares    Valsartan      diovan results in nause , upper abd. pain            Medications:     Current Facility-Administered Medications   Medication Dose Route Frequency Provider Last Rate Last Admin    acetaminophen (TYLENOL) tablet 650 mg  650 mg Oral Q4H PRN Nicole Tinoco MD        Or    acetaminophen (TYLENOL) Suppository 650 mg  650 mg Rectal Q4H PRN Nicole Tinoco MD        amLODIPine (NORVASC) tablet 5 mg  5 mg Oral Daily Bing Gleason PA-C   5 mg at 02/16/25 0841    carvedilol (COREG) tablet 6.25 mg  6.25 mg Oral BID w/meals Bing Gleason  SHANEL   6.25 mg at 02/16/25 0841    FLUoxetine (PROzac) capsule 20 mg  20 mg Oral Daily Bing Gleason PA-C   20 mg at 02/16/25 0840    hydrALAZINE (APRESOLINE) injection 10 mg  10 mg Intravenous Q4H PRN Yoni Condon MD   10 mg at 02/06/25 0702    irbesartan (AVAPRO) tablet 300 mg  300 mg Oral Daily Bing Gleason PA-C   300 mg at 02/16/25 0841    labetalol (NORMODYNE/TRANDATE) injection 5 mg  5 mg Intravenous Q4H PRN Bing Gleason PA-C        lidocaine (LMX4) cream   Topical Q1H PRN Nicole Tinoco MD        lidocaine 1 % 0.1-1 mL  0.1-1 mL Other Q1H PRN Nicole Tinoco MD        loperamide (IMODIUM) capsule 2 mg  2 mg Oral 4x Daily PRN Carmen Glaser MD        mirtazapine (REMERON) tablet 15 mg  15 mg Oral At Bedtime Jeni Ratliff APRN CNP   15 mg at 02/15/25 2025    ondansetron (ZOFRAN ODT) ODT tab 4 mg  4 mg Oral Q6H PRN Nicole Tinoco MD        Or    ondansetron (ZOFRAN) injection 4 mg  4 mg Intravenous Q6H PRN Nicole Tinoco MD   4 mg at 02/07/25 0939    pantoprazole (PROTONIX) EC tablet 40 mg  40 mg Oral QAM AC Nicole Tinoco MD   40 mg at 02/16/25 0630    polyethylene glycol (MIRALAX) Packet 17 g  17 g Oral Daily Pardeep Foster MD   17 g at 02/16/25 0840    prochlorperazine (COMPAZINE) injection 5 mg  5 mg Intravenous Q6H PRN Nicole Tinoco MD        Or    prochlorperazine (COMPAZINE) tablet 5 mg  5 mg Oral Q6H PRN Nicole Tinoco MD        QUEtiapine (SEROquel) half-tab 12.5 mg  12.5 mg Oral BID PRN Carmen Glaser MD   12.5 mg at 02/15/25 2036    senna-docusate (SENOKOT-S/PERICOLACE) 8.6-50 MG per tablet 1 tablet  1 tablet Oral BID PRN Nicole Tinoco MD        Or    senna-docusate (SENOKOT-S/PERICOLACE) 8.6-50 MG per tablet 2 tablet  2 tablet Oral BID PRN Nicole Tinoco MD        sennosides (SENOKOT) tablet 2 tablet  2 tablet Oral Daily Pardeep Foster MD   2 tablet at 02/16/25 0841    sodium  "chloride (PF) 0.9% PF flush 3 mL  3 mL Intracatheter q1 min prn Nicole Tinoco MD        sodium chloride (PF) 0.9% PF flush 3 mL  3 mL Intracatheter Q8H Nicole Tinoco MD   3 mL at 02/15/25 2025             Review of Systems:    ROS: 10 point ROS neg other than the symptoms noted above in the HPI           Physical Exam:   VS:  T: 97.6    HR: 72    BP: 163/87    RR: 15   GEN:  AOx3.  NAD.    CV:  RRR  LUNGS: Non-labored breathing.   BACK:  No midline or CVA tenderness.  ABD:  Soft.  NT.  ND.  No rebound or guarding.  No masses. Bladder is not palpable.  EXT:  Warm, well perfused.  No edema.  SKIN:  Warm.  Dry.  No rashes.  NEURO:  CN grossly intact.            Data:   All laboratory data reviewed:    No lab results found in last 7 days.    Recent Labs   Lab 02/11/25  0821   POTASSIUM 3.5     No lab results found in last 7 days.    Invalid input(s): \"URINEBLOOD\"    All pertinent imaging reviewed:    CT scan of the abdomen 2/5/2025:  Distended bladder  No hydronephrosis or other abnormalities             Impression and Plan:   Impression:   Kathy Braswell is a 89 year old female with a urologic history of ?urinary retention in the past- never requiring an indwelling bhardwaj catheter, and a PMH of severe dementia, admitted to the hospital with intermittent vomiting. She has required CIC many times for PVR > 500 mL this hospitalization. The concern is that she may not tolerate an indwelling bhardwaj catheter and it may worsen her mental status.    At this time, it is impossible to say whether or not her urinary retention will be permanent or not. I suspect this may have been an unrealized, chronic issue prior to hospitalization that is now recognized, as is the case in many patients with advanced dementia.    PLAN:  She has three choices moving forward:    1) Stop checking bladder scans. Only CIC if patient reports discomfort. This carries a risk of long-term renal deterioration and UTIs, however this should " be weighed with the patient's overall health status (severe dementia) and wishes to avoid invasive procedures (catheterization)  2) Continue CIC for PVR > 500 mL. This would keep her upper tracts protected and would also allow us to see in real-time if she were to begin voiding on her own. For some LTACH/Memory Cares, this is not an acceptable plan, so discharge planning should be kept in mind  3) Vargas catheter placement now. This may worsen mental status, but it will keep her upper tracts protected. We may choose to remove the catheter in a few weeks to see if she could void spontaneously (she likely would not be able to follow the instructions of a formal voiding trial), and replace the catheter if necessary.    Flomax is usually not helpful in scenarios such as this.   Limiting drugs that can exacerbate urinary retention may be helpful (Mirtazapine) but risks/benefits should be weighed  Treat any suspected constipation    Urodynamics studies would not be expected to  in this case     I had a long discussion with her son, Eric krishnan. After discussing the above three options, he feels it would be best to stop checking bladder scans and only catheterize her if she is uncomfortable. I agree with this approach.     Urology will sign off. Please do not hesitate to contact our team with further questions.     Elisha Ash MD  Reconstructive Urology  [facility]

## 2025-02-17 PROCEDURE — 250N000013 HC RX MED GY IP 250 OP 250 PS 637: Performed by: INTERNAL MEDICINE

## 2025-02-17 PROCEDURE — 250N000013 HC RX MED GY IP 250 OP 250 PS 637

## 2025-02-17 PROCEDURE — 250N000013 HC RX MED GY IP 250 OP 250 PS 637: Performed by: STUDENT IN AN ORGANIZED HEALTH CARE EDUCATION/TRAINING PROGRAM

## 2025-02-17 PROCEDURE — 250N000013 HC RX MED GY IP 250 OP 250 PS 637: Performed by: PHYSICIAN ASSISTANT

## 2025-02-17 PROCEDURE — 99232 SBSQ HOSP IP/OBS MODERATE 35: CPT | Performed by: INTERNAL MEDICINE

## 2025-02-17 PROCEDURE — 120N000001 HC R&B MED SURG/OB

## 2025-02-17 RX ADMIN — CARVEDILOL 6.25 MG: 3.12 TABLET, FILM COATED ORAL at 19:14

## 2025-02-17 RX ADMIN — PANTOPRAZOLE SODIUM 40 MG: 40 TABLET, DELAYED RELEASE ORAL at 06:21

## 2025-02-17 RX ADMIN — CARVEDILOL 6.25 MG: 3.12 TABLET, FILM COATED ORAL at 08:04

## 2025-02-17 RX ADMIN — IRBESARTAN 300 MG: 150 TABLET ORAL at 08:04

## 2025-02-17 RX ADMIN — AMLODIPINE BESYLATE 5 MG: 5 TABLET ORAL at 08:04

## 2025-02-17 RX ADMIN — FLUOXETINE HYDROCHLORIDE 20 MG: 20 CAPSULE ORAL at 08:04

## 2025-02-17 RX ADMIN — POLYETHYLENE GLYCOL 3350 17 G: 17 POWDER, FOR SOLUTION ORAL at 08:04

## 2025-02-17 RX ADMIN — MIRTAZAPINE 15 MG: 15 TABLET, FILM COATED ORAL at 21:29

## 2025-02-17 ASSESSMENT — ACTIVITIES OF DAILY LIVING (ADL)
ADLS_ACUITY_SCORE: 62
ADLS_ACUITY_SCORE: 65
ADLS_ACUITY_SCORE: 62
ADLS_ACUITY_SCORE: 62
ADLS_ACUITY_SCORE: 65
ADLS_ACUITY_SCORE: 62
ADLS_ACUITY_SCORE: 62
ADLS_ACUITY_SCORE: 69
ADLS_ACUITY_SCORE: 69
ADLS_ACUITY_SCORE: 62
ADLS_ACUITY_SCORE: 62
ADLS_ACUITY_SCORE: 65
ADLS_ACUITY_SCORE: 62
ADLS_ACUITY_SCORE: 65
ADLS_ACUITY_SCORE: 62
ADLS_ACUITY_SCORE: 62

## 2025-02-17 NOTE — PLAN OF CARE
Goal Outcome Evaluation:      Plan of Care Reviewed With: patient    Overall Patient Progress: improvingOverall Patient Progress: improving    Outcome Evaluation: Discharge pending, SW following.    Patient vital signs are at baseline: {Yes/No, Reason Required:640382}  Patient able to ambulate as they were prior to admission or with assist devices provided by therapies during their stay:  Yes  Patient MUST void prior to discharge:  Yes  Patient able to tolerate oral intake:  Yes  Pain has adequate pain control using Oral analgesics:  Yes  Does patient have an identified :  No,  Reason:  Discharge to LTC Facility pending, SW following.  Has goal D/C date and time been discussed with patient:  No,  Reason:  Discharge to LTC Facility pending, SW following.      Dx:Increased Confusion, Nausea/Vomiting  POD#n/a    A&Ox1 to Person Only.   CMS Intact.   VSS on RA.   Up with SBA GB and walker.   Voiding in BR w/ occasional Incontinence.   No PIV Access.  Regular Diet.  Denies Pain.

## 2025-02-17 NOTE — PLAN OF CARE
Goal Outcome Evaluation:      Date/Time: 8/16/2025 [id number]     Trauma/Ortho/Medical (Choose one) Medical     Diagnosis: Nausea / Vomiting, Confusion  POD#: NA  Mental Status: Alert to self  Activity/dangle: SBA GB/walker  Diet: Regular  Pain: Denied  Vargas/Voiding: BR  Tele/Restraints/Iso: NA  02/LDA: RA / No IV access  D/C Date: possible today with home care  Other Info:

## 2025-02-17 NOTE — CONSULTS
"SPIRITUAL HEALTH SERVICES - Consult Note  [facility]    Referral Source/Reason for Visit: Staff Consult for emotional support    Summary and Recommendations -  Kathy is recovering from surgery and hopes to return home, though she is aware that a location with a higher level of care is a possibility.  Kathy is experiencing boredom and some loneliness while away from home.  Her family (son Eric and sister) is an important support for her, as is her Spiritism karen. She is affiliated with [facility] in [place].    Plan: Kathy accepted 's offer of personal prayer.  will attempt to a second visit if Kathy's stay extends beyond a few days. [facility] remains available for emotional support per patient/family request.    Nai Pritchard, PhD   Intern    [facility] available 24/7 for emergent requests/referrals, either by paging the on-call  or by entering an ASAP/STAT consult in [employer], which will also page the on-call .     Assessment    Saw pt Kathy L Connierom per Staff Consult for emotional support.    Patient/Family Understanding of Illness and Goals of Care  Kathy had surgery just over a week ago.   She is feeling well now and awaiting determinations about her ongoing care.    Distress and Loss   Kathy expressed \"worry\" surrounding her ongoing care. Her preference is to return home and \"go back to normal\" rather than entering a care center.  Kathy is \"upset with\" her son, though she \"loves him dearly.\" She is \"worried\" that he thinks she needs professional care and cannot return home.  Kathy is \"bored\" and feeling cut off from people, as if she is \"in [facility].\" She is missing her shoes and her reading glasses.    Strengths, Coping, and Resources  Kathy's son Eric and sister are both important sources of support.  Kathy agreed with 's observation that she \"likes to focus on the positive.\"  She is \"a people person\" and enjoys interacting.    Meaning, Beliefs, and Spirituality  Kathy is Holiness and " "\"prays to the good Lord daily.\" She trusts that \"God is always with her.\"  Kathy has been affiliated with [facility] in [place], though she is not able to attend much due to mobility/transportation challenges. Friends there are aware of her health situation.  Kathy also shared meaningful memories about her time working for the [employer]. This organization remains important to her.  "

## 2025-02-17 NOTE — PROGRESS NOTES
[facility]    Medicine Progress Note - Hospitalist Service        Date of Admission:  2/5/2025 12:27 PM    Assessment & Plan:   Kathy Braswell is a 88 yo F with HTN, anxiety disorder, depression, GERD, and IBS with several weeks of increased confusion and intermittent vomiting.  Her presenting symptoms have resolved.  It is felt that she has worsening dementia and felt that she is a vulnerable adult.  Son is concerned about her living alone.   now assisting with placement at LT/memory care unit.    Vomiting, resolved  -Etiology unclear.  No episodes since admission but has had some nausea.  Per son, may be related to episodes of anxiety.  No abdominal pain to raise concerns for gastritis or PUD.  -CT abdomen negative for bowel obstruction.  .   -SLP consulted, appreciate assistance.  VFSS on 2/7 did not show any aspiration with any consistency.  Currently on regular diet, thin liquids with single sips.  No further needs from SLP perspective.  -antiemetics as needed  -Currently no significant nausea or vomiting in hospital.  -Doubt UTI played significant role, urine culture with strep anginosus  K.  Completed 3-day course of antibiotic and no need for further antibiotics.    Intermittent sinus tachycardia, resolved  HTN  -Asymptomatic, nonspecific. Etiology unclear.  Patient has known hypertension for which she is on an ACE inhibitor and amlodipine.  Noted heart rates intermittently into the 130s and were felt to be irregular. Exam notable for soft murmur.  -Tele suggests possible sinus dysrhythmia with PACs; no obvious AFib/Flutter  -Carvedilol 6.25 BID added 2/6 with hold parameters. Tolerating well so will continue for now.   -Echo 2/7: Normal EF 60-65%, normal LV, RV, valves.  -Continue PTA amlodipine and irbesartan    Urinary retention  Per patient's son, Eric, patient has had some mild issues with urinary retention in the distant past but she has never needed medication or a  Vargas catheter for this.  Nursing needing to straight cath her multiple times this hospitalization despite her being ambulatory and walking around.  She goes to the restroom to try to urinate but is not unable to get much out.  -Urology consulted for consideration of any other management strategies besides placing Vargas catheter. They had discussion with patient's son, Eric, about the urinary retention and the different options available. After discussion, Eric decided it would be best to stop checking bladder scans and only catheterize patient if she is uncomfortably. This is with consideration of how catheterization could worsen her mental status in the setting of worsening dementia.    Anxiety, Worsening Dementia  Vulnerable adult  Patient has longstanding history of anxiety managed with Prozac and daily Ativan.  It appears that she was recently started on BuSpar.  On exam, her mood and affect are rapidly transversing the spectrum of emotions from happiness to tearful to fear.  It is difficult to pin down what exactly she is feeling.  Moreover, she appears to have advanced dementia, not formally diagnosed but family notes has been progressive for several years.  Lives at home with her 90-year-old sister who helps care for patient but will be unable to do so much longer.  -Psych consulted for medication adjustments  -Continue PTA Prozac, started on Remeron 15 mg nightly, PTA Buspar discontinued.  -Son agrees that patient cannot return home.  Will likely need memory care TCU/LTC. Pending placement. Note that at one point patient's family wanted to bring her home but after further discussion realized they will not be able to provide 24/7 care and that she needs an environment with more resources. [facility] has a bed in the LTC available so current plan is for patient to discharge there early next week, possibly Monday.  -CODE STATUS changed to DNR/DNI after discussion with son.        Diet: Regular  Diet Adult Thin Liquids (level 0) (upright, single sips, liquid wash PRN)  Room Service  Snacks/Supplements Adult: Magic Cup; With Meals     DVT Prophylaxis: Pneumatic Compression Devices   Vargas Catheter: Not present  Code Status: No CPR- Do NOT Intubate     Disposition Plan       Expected Discharge Date: 02/17/2025    Discharge Delays: *Medically Ready for Discharge  Placement - TCU  Other (Add Comment)  Destination: inpatient rehabilitation facility  Discharge Comments: LTC/Memory Care      Entered: Jude Richmond MD 02/17/2025, 9:56 AM        Medically Ready for Discharge: Ready Now awaiting placement at LT/memory care       Clinically Significant Risk Factors                   # Hypertension: Noted on problem list                          The patient's care was discussed with the Bedside Nurse and Patient.    Medical Decision Making       **CLEAR ALL SELECTIONS**      Labs/Imaging Reviewed:  See Information above and Data section below  Time SPENT BY ME on the date of service doing chart review, history, exam, documentation & further activities per the note:  35 MINUTES    Chart documentation was completed, in part, with [employer] voice-recognition software. Even though reviewed, some grammatical, spelling, and word errors may remain.    Jude Richmond MD  Hospitalist Service  [facility]  Text Page 7AM-6PM  Securely message with the [employer] App Web Console (learn more here)  Text page via [email or web address] Paging/Directory    ______________________________________________________________________    Interval History   No acute events overnight.  Evaluated by urology yesterday and advised against frequent bladder scans.  He only recommended intermittent catheterization if patient reports discomfort.  Awaiting placement to long-term care facility    Data reviewed today: I reviewed all medications, new labs and imaging results over the last 24 hours. I personally reviewed no images or EKG's  "today.    Physical Exam   Vital signs:  Temp: 98  F (36.7  C) Temp src: Oral BP: (!) 157/81 Pulse: 76   Resp: 16 SpO2: 95 % O2 Device: None (Room air)     Weight: 58.5 kg (128 lb 15.5 oz)  Estimated body mass index is 26.95 kg/m  as calculated from the following:    Height as of 3/11/24: 1.473 m (4' 10\").    Weight as of this encounter: 58.5 kg (128 lb 15.5 oz).      Wt Readings from Last 2 Encounters:   02/06/25 58.5 kg (128 lb 15.5 oz)   03/11/24 60.5 kg (133 lb 4.8 oz)       Gen: AAOX1-2, NAD  Resp: CTA B/L, normal WOB  CVS: RRR, no murmur  Abd/GI: Soft, non-tender. BS- normoactive.    Skin: Warm, dry no rashes  MSK:  no pedal edema  Neuro- CN- intact. No focal deficits.       Data   Recent Labs   Lab 02/11/25  0821   POTASSIUM 3.5       No results found for this or any previous visit (from the past 24 hours).  Medications   Current Facility-Administered Medications   Medication Dose Route Frequency Provider Last Rate Last Admin     Current Facility-Administered Medications   Medication Dose Route Frequency Provider Last Rate Last Admin    amLODIPine (NORVASC) tablet 5 mg  5 mg Oral Daily Bing Gleason PA-C   5 mg at 02/17/25 0804    carvedilol (COREG) tablet 6.25 mg  6.25 mg Oral BID w/meals Bing Gleason PA-C   6.25 mg at 02/17/25 0804    FLUoxetine (PROzac) capsule 20 mg  20 mg Oral Daily Bing Gleason PA-C   20 mg at 02/17/25 0804    irbesartan (AVAPRO) tablet 300 mg  300 mg Oral Daily Bing Gleason PA-C   300 mg at 02/17/25 0804    mirtazapine (REMERON) tablet 15 mg  15 mg Oral At Bedtime Jeni Ratliff APRN CNP   15 mg at 02/16/25 2036    pantoprazole (PROTONIX) EC tablet 40 mg  40 mg Oral Nicole Holbrook MD   40 mg at 02/17/25 0621    polyethylene glycol (MIRALAX) Packet 17 g  17 g Oral Daily Pardeep Foster MD   17 g at 02/17/25 0804    sennosides (SENOKOT) tablet 2 tablet  2 tablet Oral Daily Pardeep Foster MD   2 tablet at 02/16/25 0841    sodium chloride " (PF) 0.9% PF flush 3 mL  3 mL Intracatheter Q8H Nicole Tinoco MD   3 mL at 02/15/25 2025

## 2025-02-17 NOTE — PROGRESS NOTES
Care Management Follow Up    Length of Stay (days): 11    Expected Discharge Date: 02/17/2025     Concerns to be Addressed: discharge planning     Patient plan of care discussed at interdisciplinary rounds: Yes    Anticipated Discharge Disposition: LTC/group home              Anticipated Discharge Services:    Anticipated Discharge DME:      Patient/family educated on Medicare website which has current facility and service quality ratings: yes  Education Provided on the Discharge Plan: Yes  Patient/Family in Agreement with the Plan: yes    Referrals Placed by CM/SW: Post Acute Facilities  Private pay costs discussed: Not applicable    Discussed  Partnership in Safe Discharge Planning  document with patient/family: No     Handoff Completed: No, handoff not indicated or clinically appropriate    Additional Information:  Call to [facility] to follow up on possible beds. Message left for admissions indicating that writer had spoken to both Malena and Elena regarding patient. Writer requested a call back to discuss possible beds.    Next Steps: update family after speaking to facility    KRZYSZTOF Topete

## 2025-02-17 NOTE — PLAN OF CARE
Date/Time: 2/16/25 1529-4250    Trauma/Ortho/Medical (Choose one) Medical     Diagnosis: N/V Confusion  POD#: NA  Mental Status: A&Ox1  Activity/dangle: SBA GB Walker  Diet: Regular   Pain: Denies  Vargas/Voiding: Voiding in bathroom  Tele/Restraints/Iso: NA  02/LDA: No PIV  D/C Date: Likely tomorrow home with home care  Other Info: No complaints of bladder discomfort, able to void 300ml at one time this evening

## 2025-02-18 PROCEDURE — 120N000001 HC R&B MED SURG/OB

## 2025-02-18 PROCEDURE — 99231 SBSQ HOSP IP/OBS SF/LOW 25: CPT | Performed by: HOSPITALIST

## 2025-02-18 PROCEDURE — 250N000013 HC RX MED GY IP 250 OP 250 PS 637: Performed by: INTERNAL MEDICINE

## 2025-02-18 PROCEDURE — 250N000013 HC RX MED GY IP 250 OP 250 PS 637: Performed by: STUDENT IN AN ORGANIZED HEALTH CARE EDUCATION/TRAINING PROGRAM

## 2025-02-18 PROCEDURE — 250N000013 HC RX MED GY IP 250 OP 250 PS 637

## 2025-02-18 PROCEDURE — 250N000013 HC RX MED GY IP 250 OP 250 PS 637: Performed by: PHYSICIAN ASSISTANT

## 2025-02-18 RX ADMIN — MIRTAZAPINE 15 MG: 15 TABLET, FILM COATED ORAL at 21:04

## 2025-02-18 RX ADMIN — CARVEDILOL 6.25 MG: 3.12 TABLET, FILM COATED ORAL at 17:14

## 2025-02-18 RX ADMIN — SENNOSIDES 2 TABLET: 8.6 TABLET ORAL at 08:50

## 2025-02-18 RX ADMIN — CARVEDILOL 6.25 MG: 3.12 TABLET, FILM COATED ORAL at 08:50

## 2025-02-18 RX ADMIN — PANTOPRAZOLE SODIUM 40 MG: 40 TABLET, DELAYED RELEASE ORAL at 06:33

## 2025-02-18 RX ADMIN — FLUOXETINE HYDROCHLORIDE 20 MG: 20 CAPSULE ORAL at 08:51

## 2025-02-18 RX ADMIN — IRBESARTAN 300 MG: 150 TABLET ORAL at 08:50

## 2025-02-18 RX ADMIN — AMLODIPINE BESYLATE 5 MG: 5 TABLET ORAL at 08:49

## 2025-02-18 ASSESSMENT — ACTIVITIES OF DAILY LIVING (ADL)
ADLS_ACUITY_SCORE: 67
ADLS_ACUITY_SCORE: 69
ADLS_ACUITY_SCORE: 71
ADLS_ACUITY_SCORE: 69
ADLS_ACUITY_SCORE: 69
ADLS_ACUITY_SCORE: 67
ADLS_ACUITY_SCORE: 69
ADLS_ACUITY_SCORE: 69
ADLS_ACUITY_SCORE: 67
ADLS_ACUITY_SCORE: 69
ADLS_ACUITY_SCORE: 67
ADLS_ACUITY_SCORE: 67
ADLS_ACUITY_SCORE: 71

## 2025-02-18 NOTE — PLAN OF CARE
Goal Outcome Evaluation:       Pt alert to self, baseline dementia. Emotional at times. VSS on RA. Reg diet. Up 1A GB&W, voiding in BR. Denies pain, CMS intact. NO IV access. Discharge to LTC pending placement, Continue plan of Care.

## 2025-02-18 NOTE — PLAN OF CARE
Goal Outcome Evaluation:        Trauma/Ortho/Medical (Choose one)  Medical.    Diagnosis: N/V, Confusion.  POD#: Not surgical.  Mental Status: Alert to self. Baseline dementia.  Activity/dangle Up with Assist of 1 gait belt and walker.  Diet: Regular diet.  Pain: Denies pain on this shift.  Vargas/Voiding: BR.  Tele/Restraints/Iso: None.  02/LDA: No IV access.  D/C Date: Pending for TCU placement.  Other Info: Pt VSS on room air, continue monitoring.

## 2025-02-18 NOTE — PROGRESS NOTES
2/17 0261-7327     Diagnosis: N/V, Confusion.    Mental Status: Alert to self. Baseline dementia.  Activity/dangle: Assistx 1 gb/w  Diet: Regular   Pain: Denies pain   Vargas/Voiding: BR  Tele/Restraints/Iso: Na  02/LDA: No IV access  D/C Date: Pending for TCU placement  Other Info: Pt VSS on room air. Continue to care and monitor her emotional wellbeing following her discharge planning.

## 2025-02-18 NOTE — PROGRESS NOTES
Care Management Follow Up    Length of Stay (days): 12    Expected Discharge Date: 02/19/2025     Concerns to be Addressed: discharge planning     Patient plan of care discussed at interdisciplinary rounds: Yes    Anticipated Discharge Disposition: Long Term Care, Assisted Living              Anticipated Discharge Services:    Anticipated Discharge DME:      Patient/family educated on Medicare website which has current facility and service quality ratings: yes  Education Provided on the Discharge Plan: Yes  Patient/Family in Agreement with the Plan: yes    Referrals Placed by CM/SW: Post Acute Facilities  Private pay costs discussed: Not applicable    Discussed  Partnership in Safe Discharge Planning  document with patient/family: No     Handoff Completed: No, handoff not indicated or clinically appropriate    Additional Information:  Call placed to Malena at [facility] to follow up on referral and  bed that they are assessing patient for. Message left requesting a call back.    1100: Malena from [facility] called back. They have a bed available and she said patient's son did a tour on Friday. She'd like updated notes faxed to [phone]. They would like for patient to admit soon into that room as long as they still feel that she's a fit. Writer noted that patient is more than discharge ready and we need to make a plan and she understands. She will update writer after receiving the notes. Fax sent.    Message received from son Eric asking for an update. Writer updated him and will let him know when Malena calls back to confirm plan.    1630: Call to [facility] to follow up on information that was faxed earlier today. MARIZA left with request for a call back.     Next Steps: follow up with family    KRZYSZTOF Topete

## 2025-02-18 NOTE — PROGRESS NOTES
[facility]    Medicine Progress Note - Hospitalist Service        Date of Admission:  2/5/2025 12:27 PM    Assessment & Plan:   Kathy Braswell is a 89 yo F with HTN, anxiety disorder, depression, GERD, and IBS with several weeks of increased confusion and intermittent vomiting.  Her presenting symptoms have resolved.  It is felt that she has worsening dementia and felt that she is a vulnerable adult.  Son is concerned about her living alone.   now assisting with placement at LT/memory care unit.    Vomiting, resolved  -Etiology unclear.  No episodes since admission but has had some nausea.  Per son, may be related to episodes of anxiety.  No abdominal pain to raise concerns for gastritis or PUD.  -CT abdomen negative for bowel obstruction.  .   -SLP consulted, appreciate assistance.  VFSS on 2/7 did not show any aspiration with any consistency.  Currently on regular diet, thin liquids with single sips.  No further needs from SLP perspective.  -antiemetics as needed  -Currently no significant nausea or vomiting in hospital.  -Doubt UTI played significant role, urine culture with strep anginosus  K.  Completed 3-day course of antibiotic and no need for further antibiotics.    Intermittent sinus tachycardia, resolved  HTN  -Asymptomatic, nonspecific. Etiology unclear.  Patient has known hypertension for which she is on an ACE inhibitor and amlodipine.  Noted heart rates intermittently into the 130s and were felt to be irregular. Exam notable for soft murmur.  -Tele suggests possible sinus dysrhythmia with PACs; no obvious AFib/Flutter  -Carvedilol 6.25 BID added 2/6 with hold parameters. Tolerating well so will continue for now.   -Echo 2/7: Normal EF 60-65%, normal LV, RV, valves.  -Continue PTA amlodipine and irbesartan    Urinary retention  Per patient's son, Eric, patient has had some mild issues with urinary retention in the distant past but she has never needed medication or a  Vargas catheter for this.  Nursing needing to straight cath her multiple times this hospitalization despite her being ambulatory and walking around.  She goes to the restroom to try to urinate but is not unable to get much out.  -Urology consulted for consideration of any other management strategies besides placing Vargas catheter. They had discussion with patient's son, Eric, about the urinary retention and the different options available. After discussion, Eric decided it would be best to stop checking bladder scans and only catheterize patient if she is uncomfortable. This is with consideration of how catheterization could worsen her mental status in the setting of worsening dementia.    Anxiety, Worsening Dementia  Vulnerable adult  Patient has longstanding history of anxiety managed with Prozac and daily Ativan.  It appears that she was recently started on BuSpar.  On exam, her mood and affect are rapidly transversing the spectrum of emotions from happiness to tearful to fear.  It is difficult to pin down what exactly she is feeling.  Moreover, she appears to have advanced dementia, not formally diagnosed but family notes has been progressive for several years.  Lives at home with her 90-year-old sister who helps care for patient but will be unable to do so much longer.  -Psych consulted for medication adjustments  -Continue PTA Prozac, started on Remeron 15 mg nightly, PTA Buspar discontinued.  -Son agrees that patient cannot return home.  Will likely need memory care TCU/LTC. Pending placement. Note that at one point patient's family wanted to bring her home but after further discussion realized they will not be able to provide 24/7 care and that she needs an environment with more resources. [facility] has a bed in the LTC available so current plan is for patient to discharge there-whenever final arrangements have been made.  -CODE STATUS changed to DNR/DNI after discussion with son.        Diet:  "Regular Diet Adult Thin Liquids (level 0) (upright, single sips, liquid wash PRN)  Room Service  Snacks/Supplements Adult: Magic Cup; With Meals     DVT Prophylaxis: Pneumatic Compression Devices   Vargas Catheter: Not present  Code Status: No CPR- Do NOT Intubate     Disposition Plan       Expected Discharge Date: 02/19/2025    Discharge Delays: *Medically Ready for Discharge  Placement - TCU  Other (Add Comment)  Destination: inpatient rehabilitation facility  Discharge Comments: LT/Memory Care  need face to face assessment      Entered: Carmen Glaser MD 02/18/2025, 5:57 PM        Medically Ready for Discharge: Ready Now awaiting placement at LT/memory care       Clinically Significant Risk Factors                   # Hypertension: Noted on problem list                          The patient's care was discussed with the Bedside Nurse and Patient.    Medical Decision Making           ______________________________________________________________________    Interval History   No acute events overnight.  Patient was seen ambulating the hallways with therapies.  Awaiting placement.    Data reviewed today: I reviewed all medications, new labs and imaging results over the last 24 hours. I personally reviewed no images or EKG's today.    Physical Exam   Vital signs:  Temp: 98  F (36.7  C) Temp src: Oral BP: 120/60 Pulse: 74   Resp: 16 SpO2: 96 % O2 Device: None (Room air)     Weight: 58.5 kg (128 lb 15.5 oz)  Estimated body mass index is 26.95 kg/m  as calculated from the following:    Height as of 3/11/24: 1.473 m (4' 10\").    Weight as of this encounter: 58.5 kg (128 lb 15.5 oz).      Wt Readings from Last 2 Encounters:   02/06/25 58.5 kg (128 lb 15.5 oz)   03/11/24 60.5 kg (133 lb 4.8 oz)       Gen: AAOX1-2, NAD  Resp: CTA B/L, normal WOB  CVS:  Abd/GI: Nondistended  Skin: Warm, dry no rashes  MSK:  no pedal edema  Neuro-no facial asymmetry, alert and pleasantly confused but answering simple questions, forgetful, " moving all extremities, fluent speech      Data   No lab results found in last 7 days.      No results found for this or any previous visit (from the past 24 hours).  Medications   Current Facility-Administered Medications   Medication Dose Route Frequency Provider Last Rate Last Admin     Current Facility-Administered Medications   Medication Dose Route Frequency Provider Last Rate Last Admin    amLODIPine (NORVASC) tablet 5 mg  5 mg Oral Daily Bing Gleason PA-C   5 mg at 02/18/25 0849    carvedilol (COREG) tablet 6.25 mg  6.25 mg Oral BID w/meals Bing Gleason PA-C   6.25 mg at 02/18/25 1714    FLUoxetine (PROzac) capsule 20 mg  20 mg Oral Daily Bing Gleason PA-C   20 mg at 02/18/25 0851    irbesartan (AVAPRO) tablet 300 mg  300 mg Oral Daily Bing Gleason PA-C   300 mg at 02/18/25 0850    mirtazapine (REMERON) tablet 15 mg  15 mg Oral At Bedtime Jeni Ratliff APRN CNP   15 mg at 02/17/25 2129    pantoprazole (PROTONIX) EC tablet 40 mg  40 mg Oral QAM AC Nicole Tinoco MD   40 mg at 02/18/25 0633    polyethylene glycol (MIRALAX) Packet 17 g  17 g Oral Daily Pardeep Foster MD   17 g at 02/17/25 0804    sennosides (SENOKOT) tablet 2 tablet  2 tablet Oral Daily Pardeep Foster MD   2 tablet at 02/18/25 0850    sodium chloride (PF) 0.9% PF flush 3 mL  3 mL Intracatheter Q8H Nicole Tinoco MD   3 mL at 02/15/25 2025

## 2025-02-19 VITALS
BODY MASS INDEX: 26.95 KG/M2 | TEMPERATURE: 98.2 F | RESPIRATION RATE: 18 BRPM | DIASTOLIC BLOOD PRESSURE: 52 MMHG | HEART RATE: 73 BPM | WEIGHT: 128.97 LBS | OXYGEN SATURATION: 95 % | SYSTOLIC BLOOD PRESSURE: 126 MMHG

## 2025-02-19 PROCEDURE — 250N000013 HC RX MED GY IP 250 OP 250 PS 637: Performed by: INTERNAL MEDICINE

## 2025-02-19 PROCEDURE — 250N000013 HC RX MED GY IP 250 OP 250 PS 637: Performed by: PHYSICIAN ASSISTANT

## 2025-02-19 PROCEDURE — 99239 HOSP IP/OBS DSCHRG MGMT >30: CPT | Performed by: HOSPITALIST

## 2025-02-19 PROCEDURE — 250N000013 HC RX MED GY IP 250 OP 250 PS 637: Performed by: STUDENT IN AN ORGANIZED HEALTH CARE EDUCATION/TRAINING PROGRAM

## 2025-02-19 RX ORDER — AMOXICILLIN 250 MG
1 CAPSULE ORAL 2 TIMES DAILY PRN
DISCHARGE
Start: 2025-02-19

## 2025-02-19 RX ORDER — MIRTAZAPINE 15 MG/1
15 TABLET, FILM COATED ORAL AT BEDTIME
DISCHARGE
Start: 2025-02-19

## 2025-02-19 RX ORDER — POLYETHYLENE GLYCOL 3350 17 G/17G
17 POWDER, FOR SOLUTION ORAL DAILY PRN
DISCHARGE
Start: 2025-02-19

## 2025-02-19 RX ORDER — QUETIAPINE FUMARATE 25 MG/1
12.5 TABLET, FILM COATED ORAL 2 TIMES DAILY PRN
DISCHARGE
Start: 2025-02-19

## 2025-02-19 RX ORDER — CARVEDILOL 6.25 MG/1
6.25 TABLET ORAL 2 TIMES DAILY WITH MEALS
DISCHARGE
Start: 2025-02-19

## 2025-02-19 RX ADMIN — AMLODIPINE BESYLATE 5 MG: 5 TABLET ORAL at 09:19

## 2025-02-19 RX ADMIN — IRBESARTAN 300 MG: 150 TABLET ORAL at 09:20

## 2025-02-19 RX ADMIN — CARVEDILOL 6.25 MG: 3.12 TABLET, FILM COATED ORAL at 09:19

## 2025-02-19 RX ADMIN — FLUOXETINE HYDROCHLORIDE 20 MG: 20 CAPSULE ORAL at 09:19

## 2025-02-19 RX ADMIN — SENNOSIDES 2 TABLET: 8.6 TABLET ORAL at 09:20

## 2025-02-19 RX ADMIN — PANTOPRAZOLE SODIUM 40 MG: 40 TABLET, DELAYED RELEASE ORAL at 07:28

## 2025-02-19 ASSESSMENT — ACTIVITIES OF DAILY LIVING (ADL)
ADLS_ACUITY_SCORE: 67
ADLS_ACUITY_SCORE: 64
ADLS_ACUITY_SCORE: 67
ADLS_ACUITY_SCORE: 64
ADLS_ACUITY_SCORE: 67
ADLS_ACUITY_SCORE: 64
ADLS_ACUITY_SCORE: 67
ADLS_ACUITY_SCORE: 67

## 2025-02-19 NOTE — PROGRESS NOTES
Care Management Discharge Note    Discharge Date: 02/19/2025       Discharge Disposition: Long Term Care, Assisted Living    Discharge Services:      Discharge DME:      Discharge Transportation: family or friend will provide    Private pay costs discussed: transportation costs    Does the patient's insurance plan have a 3 day qualifying hospital stay waiver?  No    PAS Confirmation Code: [id number]  Patient/family educated on Medicare website which has current facility and service quality ratings: yes    Education Provided on the Discharge Plan: Yes  Persons Notified of Discharge Plans: Patient, son, HUC, bedside nurse  Patient/Family in Agreement with the Plan: yes    Handoff Referral Completed: No, handoff not indicated or clinically appropriate    Additional Information:  Writer received a call from [facility]. They are able to accept the patient today and needed her before 2pm. Writer called patients son Eric Reyes is in agreement with this plan and asked that transport be arranged. Writer arranged stretcher transport for the patient due to patients dementia and need for supervision. Stretcher ride was arranged for [id number]. Writer updated the facility and sent discharge orders. Writer called Eric and updated him as well. Electronic PCS completed    PAS-RR    D: Per DHS regulation, SW completed and submitted PAS-RR to [place] [employer]ging Direct Connect via the Senior LinkAge Line.  PAS-RR confirmation # is : [id number]      P: Further questions may be directed to [facility] LinkAge Line at #[phone], option #4 for PAS-RR staff.     PRAMOD COREYFederal Medical Center, [place]  INPATIENT CARE COORDINATION

## 2025-02-19 NOTE — CARE PLAN
Occupational Therapy Discharge Summary    Reason for therapy discharge:    Discharged to long term care facility.    Progress towards therapy goal(s). See goals on Care Plan in [facility] electronic health record for goal details.  Goals not met.  Barriers to achieving goals:   discharge from facility.    Therapy recommendation(s):    Continued therapy is recommended.  Rationale/Recommendations:  To maximize IND in ADL.

## 2025-02-19 NOTE — PLAN OF CARE
Goal Outcome Evaluation:       Pt Alert to self, baseline dementia. VSS on RA. Reg diet. Up 1A GB&W, voiding in BR. Denies pain, CMS intact. Belongings and paperwork sent with patient. Pt to be discharged to LTC ([facility]) w/ stretcher ride transport arriving between [id number]. Paperwork handed to [facility] transport personnel. Continue plan of Care.

## 2025-02-19 NOTE — PLAN OF CARE
Goal Outcome Evaluation:    Date/Time: 8/18/2025 4236-0779     Trauma/Ortho/Medical (Choose one) Medical     Diagnosis: Nausea / Vomiting, Confusion  POD#: NA  Mental Status: Alert to self  Activity/dangle: SBA GB/walker  Diet: Regular  Pain: Denied  Vargas/Voiding: BR  Tele/Restraints/Iso: NA  02/LDA: RA / No IV access  D/C Date: Pending placement

## 2025-02-19 NOTE — PLAN OF CARE
Date/Time: 2/18/24 9438-1380    Trauma/Ortho/Medical (Choose one)  Medical     Diagnosis: N/V Confusion, Urinary Retention   POD#: NA  Mental Status: A&Ox1  Activity/dangle: A1 GB Walker  Diet: Regular   Pain: Denies   Vargas/Voiding: Voiding small amounts, bladder scan if pt uncomfortable   Tele/Restraints/Iso: NA  02/LDA: No PIV  D/C Date: Pending   Other Info: Looking for  bed, hopefully discharge soon

## 2025-02-19 NOTE — DISCHARGE SUMMARY
Discharge Summary  Hospitalist    Date of Admission:  2/5/2025  Date of Discharge:  2/19/2025  Discharging Provider: Carmen Glaser MD, MD  Date of Service (when I saw the patient): 02/19/25    Discharge Diagnoses   Anxiety, Worsening Dementia  Vulnerable adult    History of Present Illness   Please refer H & P for details.      Hospital Course     Kathy Braswell is a 89 yo F with HTN, anxiety disorder, depression, GERD, and IBS with several weeks of increased confusion and intermittent vomiting.  Her presenting symptoms have resolved.  It is felt that she has worsening dementia and felt that she is a vulnerable adult.  Son is concerned about her living alone.   now assisting with placement at LT/memory care unit.     Vomiting, resolved  -Etiology unclear.  No episodes since admission but has had some nausea.  Per son, may be related to episodes of anxiety.  No abdominal pain to raise concerns for gastritis or PUD.  -CT abdomen negative for bowel obstruction.  .   -SLP consulted, appreciate assistance.  VFSS on 2/7 did not show any aspiration with any consistency.  Currently on regular diet, thin liquids with single sips.  No further needs from SLP perspective.  -antiemetics as needed  -Currently no significant nausea or vomiting in hospital.  -Doubt UTI played significant role, urine culture with strep anginosus  K.  Completed 3-day course of antibiotic and no need for further antibiotics.     Intermittent sinus tachycardia, resolved  HTN  -Asymptomatic, nonspecific. Etiology unclear.  Patient has known hypertension for which she is on an ACE inhibitor and amlodipine.  Noted heart rates intermittently into the 130s and were felt to be irregular. Exam notable for soft murmur.  -Tele suggests possible sinus dysrhythmia with PACs; no obvious AFib/Flutter  -Carvedilol 6.25 BID added 2/6 with hold parameters. Tolerating well so will continue for now.   -Echo 2/7: Normal EF 60-65%, normal LV, RV,  valves.  -Continue PTA amlodipine and irbesartan     Urinary retention  Per patient's son, Eric, patient has had some mild issues with urinary retention in the distant past but she has never needed medication or a Vargas catheter for this.  Nursing needing to straight cath her multiple times this hospitalization despite her being ambulatory and walking around.  She goes to the restroom to try to urinate but is not unable to get much out.  -Urology consulted for consideration of any other management strategies besides placing Vargas catheter. They had discussion with patient's son, Eric, about the urinary retention and the different options available. After discussion, Eric decided it would be best to stop checking bladder scans and only catheterize patient if she is uncomfortable. This is with consideration of how catheterization could worsen her mental status in the setting of worsening dementia.     Anxiety, Worsening Dementia  Vulnerable adult  Patient has longstanding history of anxiety managed with Prozac and daily Ativan.  It appears that she was recently started on BuSpar.  On exam, her mood and affect are rapidly transversing the spectrum of emotions from happiness to tearful to fear.  It is difficult to pin down what exactly she is feeling.  Moreover, she appears to have advanced dementia, not formally diagnosed but family notes has been progressive for several years.  Lives at home with her 90-year-old sister who helps care for patient but will be unable to do so much longer.  -Psych consulted for medication adjustments  -Continue PTA Prozac, started on Remeron 15 mg nightly, PTA Buspar discontinued.  PTA Ativan discontinued.  -Son agrees that patient cannot return home.  Patient is discharged to LTC in stable condition on 2/19.  -CODE STATUS changed to DNR/DNI after discussion with son.      Carmen Glaser MD, MD      Pending Results   These results will be followed up by Hospitalist team.  Unresulted  Labs Ordered in the Past 30 Days of this Admission       No orders found from 1/6/2025 to 2/6/2025.            Code Status   DNR / DNI       Primary Care Physician   [place]    Follow-ups Needed After Discharge   Follow-up Appointments       Follow Up and recommended labs and tests      Follow up with Nursing home physician.  No follow up labs or test are needed.                Physical Exam   Temp: 98.2  F (36.8  C) Temp src: Oral BP: 126/52 Pulse: 73   Resp: 18 SpO2: 95 % O2 Device: None (Room air)    Vitals:    02/05/25 2100 02/06/25 0609   Weight: 53.9 kg (118 lb 12.8 oz) 58.5 kg (128 lb 15.5 oz)     Vital Signs with Ranges  Temp:  [98.2  F (36.8  C)] 98.2  F (36.8  C)  Pulse:  [73] 73  Resp:  [18] 18  BP: (126)/(52) 126/52  SpO2:  [95 %] 95 %  I/O last 3 completed shifts:  In: 300 [P.O.:300]  Out: 900 [Urine:900]      Gen: AAOX1-2, NAD  Resp: CTA B/L, normal WOB  CVS:  Abd/GI: Nondistended  Skin: Warm, dry no rashes  MSK:  no pedal edema  Neuro-no facial asymmetry, alert and pleasantly confused but answering simple questions, forgetful, moving all extremities, fluent speech.  Patient was anxious today about leaving hospital and wanting to go home.    Discharge Disposition   Discharged to long-term care facility  Condition at discharge: Stable    Consultations This Hospital Stay   PHYSICAL THERAPY ADULT IP CONSULT  OCCUPATIONAL THERAPY ADULT IP CONSULT  CARE MANAGEMENT / SOCIAL WORK IP CONSULT  SPEECH LANGUAGE PATH ADULT IP CONSULT  PSYCHIATRY IP CONSULT  VASCULAR ACCESS ADULT IP CONSULT  UROLOGY IP CONSULT  SPIRITUAL HEALTH SERVICES IP CONSULT  PHYSICAL THERAPY ADULT IP CONSULT  OCCUPATIONAL THERAPY ADULT IP CONSULT    Time Spent on this Encounter   Carmen DOUGHERTY MD, personally saw the patient today and spent greater than 30 minutes discharging this patient.    Discharge Orders      General info for SNF    Length of Stay Estimate: Long term care  Condition at Discharge: Stable  Level of care:skilled    Rehabilitation Potential: Good  Admission H&P remains valid and up-to-date: Yes  Recent Chemotherapy: N/A  Use Nursing Home Standing Orders: Yes     Mantoux instructions    Give two-step Mantoux (PPD) Per Facility Policy Yes     Follow Up and recommended labs and tests    Follow up with Nursing home physician.  No follow up labs or test are needed.     Reason for your hospital stay    Anxiety, dementia, nausea, vomiting     Activity - Up with nursing assistance     Physical Therapy Adult Consult    Evaluate and treat as clinically indicated.    Reason:  weakness     Occupational Therapy Adult Consult    Evaluate and treat as clinically indicated.    Reason:  weakness     Diet    Follow this diet upon discharge: Current Diet:Orders Placed This Encounter      Room Service      Snacks/Supplements Adult: Magic Cup; With Meals      Regular Diet Adult Thin Liquids (level 0) (upright, single sips, liquid wash PRN)     Discharge Medications   Discharge Medication List as of 2/19/2025 11:03 AM        START taking these medications    Details   carvedilol (COREG) 6.25 MG tablet Take 1 tablet (6.25 mg) by mouth 2 times daily (with meals)., Transitional      mirtazapine (REMERON) 15 MG tablet Take 1 tablet (15 mg) by mouth at bedtime., Transitional      polyethylene glycol (MIRALAX) 17 g packet Take 17 g by mouth daily as needed for constipation., Transitional      QUEtiapine (SEROQUEL) 25 MG tablet Take 0.5 tablets (12.5 mg) by mouth 2 times daily as needed (anxiety, agitation)., Transitional      senna-docusate (SENOKOT-S/PERICOLACE) 8.6-50 MG tablet Take 1 tablet by mouth 2 times daily as needed for constipation., Transitional           CONTINUE these medications which have NOT CHANGED    Details   acetaminophen (TYLENOL) 325 MG tablet Take 1-2 tablets (325-650 mg) by mouth every 6 hours as needed for mild pain, Historical      amLODIPine (NORVASC) 5 MG tablet Take 1 tablet (5 mg) by mouth daily, Disp-90 tablet, R-3,  E-PrescribeUpdate your computer.  Approval for  future refills when needed. Pt doesn't require refill at this time      cholecalciferol (VITAMIN D) 1000 UNIT tablet Take 1 tablet by mouth daily., Historical      FLUoxetine (PROZAC) 20 MG capsule Take 1 capsule (20 mg) by mouth daily, Disp-90 capsule, R-3, E-PrescribeUpdate your computer.  Approval for  future refills when needed. Pt doesn't require refill at this time      irbesartan (AVAPRO) 300 MG tablet TAKE 1 TABLET(300 MG) BY MOUTH DAILY, Disp-90 tablet, R-1, E-Prescribe           STOP taking these medications       busPIRone (BUSPAR) 15 MG tablet Comments:   Reason for Stopping:         LORazepam (ATIVAN) 0.5 MG tablet Comments:   Reason for Stopping:             Allergies   Allergies   Allergen Reactions    Citalopram      Nausea      Duloxetine Hcl      dizziness    Lisinopril      cough    Metoprolol Succinate      toprol xl results in nausea, upper abd. pain    Morphine And Codeine      vomiting    Trazodone Hcl      Caused dizziness and nightmares    Valsartan      diovan results in nause , upper abd. pain     Data   Most Recent 3 CBC's:  Recent Labs   Lab Test 02/06/25  0413 02/05/25  1316 02/05/25  1308 02/21/23  1504   WBC 9.1  --  9.3 7.8   HGB 15.5 15.3 15.2 15.4   MCV 84  --  82 90     --  262 280      Most Recent 3 BMP's:  Recent Labs   Lab Test 02/11/25  0821 02/08/25  0849 02/08/25  0332 02/06/25  1131 02/06/25  0413 02/05/25  1316 02/05/25  1308 03/11/24  0951   NA  --   --   --   --  135 130* 131* 139   POTASSIUM 3.5 3.8 3.8   < > 3.3* 2.9* 3.1* 4.0   CHLORIDE  --   --   --   --  95*  --  90* 103   CO2  --   --   --   --  22 -- 23 25   BUN  --   --   --   --  10.3  --  13.0 12.7   CR  --   --   --   --  0.57  --  0.51 0.59   ANIONGAP  --   --   --   --  18*  --  18* 11   LAWRENCE  --   --   --   --  9.1  --  9.1 9.4   GLC  --   --   --   --  98  --  105* 102*    < > = values in this interval not displayed.     Most Recent 2 LFT's:  Recent  Labs   Lab Test 02/05/25  1308 03/11/24  0951   AST 24 18   ALT 11 10   ALKPHOS 63 67   BILITOTAL 0.9 0.5     Most Recent INR's and Anticoagulation Dosing History:  Anticoagulation Dose History          Latest Ref Rng & Units 8/25/2012 4/15/2018   Recent Dosing and Labs   INR 0.86 - 1.14 1.00  1.05      Most Recent 3 Troponin's:No lab results found.  Most Recent Cholesterol Panel:  Recent Labs   Lab Test 07/08/19  0935   CHOL 193   LDL 99   HDL 84   TRIG 52     Most Recent 6 Bacteria Isolates From Any Culture (See EPIC Reports for Culture Details):  Recent Labs   Lab Test 04/15/18  1443   CULT No growth     Most Recent TSH, T4 and A1c Labs:  Recent Labs   Lab Test 02/06/25  0413   TSH 4.03       Results for orders placed or performed during the hospital encounter of 02/05/25   CT Head w/o Contrast    Narrative    EXAM: CT HEAD W/O CONTRAST  LOCATION: [facility]  DATE: 2/5/2025    INDICATION: Headache, vomiting.  COMPARISON: None.  TECHNIQUE: Routine CT Head without IV contrast. Multiplanar reformats. Dose reduction techniques were used.    FINDINGS:  INTRACRANIAL CONTENTS: No intracranial hemorrhage, extraaxial collection, or mass effect. No CT evidence of acute infarct. Moderate presumed chronic small vessel ischemic changes. Mild to moderate generalized volume loss. No hydrocephalus. Corpus   callosum is normal. Cerebellar tonsillar position is satisfactory. No sella or suprasellar mass/hemorrhage. Vascular calcification.     VISUALIZED ORBITS/SINUSES/MASTOIDS: Prior bilateral cataract surgery. Visualized portions of the orbits are otherwise unremarkable. No paranasal sinus mucosal disease. No middle ear or mastoid effusion.    BONES/SOFT TISSUES: No scalp hematoma. No skull fracture. Degenerative changes both TMJs. Demineralization of the skull base. Nasopharynx is patent.      Impression    IMPRESSION:  1.  No CT evidence for acute intracranial process.    2.  Brain atrophy and presumed  chronic microvascular ischemic changes as above.   CT Abdomen Pelvis w Contrast    Narrative    EXAM: CT ABDOMEN PELVIS W CONTRAST  LOCATION: [facility]  DATE: 2/5/2025    INDICATION: Vomiting, new, persistent.  COMPARISON: 12/17/2014  TECHNIQUE: CT scan of the abdomen and pelvis was performed following injection of IV contrast. Multiplanar reformats were obtained. Dose reduction techniques were used.  CONTRAST: 67mL Isovue 370.    FINDINGS:   LOWER CHEST: Minimal scattered atelectasis and/or fibrosis. Tiny hiatal hernia.    HEPATOBILIARY: Normal contour with no significant mass. No bile duct dilatation. Calcified gallstones.    PANCREAS: No significant mass, duct dilatation, or inflammatory change.    SPLEEN: Normal size.    ADRENAL GLANDS: Mild thickening bilaterally.    KIDNEYS/BLADDER: No significant mass, stones, or hydronephrosis. There are simple or benign cysts. No follow up is needed. Low-attenuation subcentimeter renal lesion(s) compatible with benign cysts or other benign lesions. No routine follow-up is   recommended in a low-risk patient.    BOWEL: No obstruction or inflammatory change.    LYMPH NODES: No lymphadenopathy.    VASCULATURE: There are mild atherosclerotic changes of the visualized aorta and its branches. There is no evidence of aortic dissection or aneurysm.    PELVIC ORGANS: No pelvic masses.    MUSCULOSKELETAL: No frankly destructive bony lesions. Age-indeterminate compression deformities of L1 and L3. Posttraumatic deformities of the right superior and inferior pubic ramus.      Impression    IMPRESSION:   1.  Cholelithiasis without cholecystitis.  2.  No bowel obstruction.   XR Video Swallow with SLP or OT    Narrative    EXAM: XR VIDEO SWALLOW WITH SLP OR OT  LOCATION: [facility]  DATE: 2/7/2025    INDICATION: Difficulty swallowing.  COMPARISON: None.    TECHNIQUE: Routine swallow study with speech pathology using multiple barium  thicknesses.    RADIATION DOSE: Total Air Kerma 2.4 mGy      Impression    IMPRESSION: Shallow intermittent thin liquid penetration. No aspiration with any consistency. Please see speech pathology note for further details.   Echocardiogram Complete     Value    LVEF  60-65%    [facility]    [id number]  [id number]  [id number]  [id number]^EFRAIN^ROOPA^ASHLEY     [facility]  Echocardiography Laboratory  [address] [place] [address]     Name: RACHEL PATEL  MRN: [id number]  : 1935  Study Date: 2025 02:36 PM  Age: 89 yrs  Gender: Female  Patient Location: [facility]  Reason For Study: Tachycardia  Ordering Physician: ROOPA ZUNIGA  Performed By: Deann Montez     BSA: 1.5 m2  Height: 58 in  Weight: 128 lb  HR: 173  BP: 153/83 mmHg  ______________________________________________________________________________  Procedure  Echocardiogram with two-dimensional, color and spectral Doppler. Definity (NDC  #[id number]) given intravenously.  ______________________________________________________________________________  Interpretation Summary     Left ventricular systolic function is normal.  The visual ejection fraction is 60-65%.  Normal left ventricular wall motion  The right ventricle is normal in structure, function and size.  No significant valve dysfunction.  The inferior vena cava was normal in size with preserved respiratory  variability.  There is no pericardial effusion.     No prior study for comparison.  ______________________________________________________________________________  Left Ventricle  The left ventricle is normal in structure, function and size. There is normal  left ventricular wall thickness. Left ventricular systolic function is normal.  The visual ejection fraction is 60-65%. Grade I or early diastolic  dysfunction. Normal left ventricular wall motion.     Right Ventricle  The right ventricle is normal in structure, function and size.     Atria  Normal left atrial  size. Right atrial size is normal.     Mitral Valve  The mitral valve is normal in structure and function.     Tricuspid Valve  The tricuspid valve is normal in structure and function. Right ventricular  systolic pressure could not be approximated due to inadequate tricuspid  regurgitation.     Aortic Valve  The aortic valve is trileaflet with aortic valve sclerosis. There is mild (1+)  aortic regurgitation.     Pulmonic Valve  The pulmonic valve is normal in structure and function.     Vessels  The aortic root is normal size. Normal size ascending aorta. The inferior vena  cava was normal in size with preserved respiratory variability.     Pericardium  There is no pericardial effusion.     ______________________________________________________________________________  MMode/2D Measurements & Calculations  IVSd: 1.0 cm  LVIDd: 3.9 cm  LVIDs: 2.1 cm  LVPWd: 1.0 cm  FS: 46.2 %     LV mass(C)d: 122.1 grams  LV mass(C)dI: 81.1 grams/m2  Ao root diam: 3.0 cm  LA dimension: 3.4 cm  asc Aorta Diam: 3.0 cm  LA/Ao: 1.2  LVOT diam: 2.0 cm  LVOT area: 3.2 cm2  Ao root diam index Ht(cm/m): 2.0  Ao root diam index BSA (cm/m2): 2.0  Asc Ao diam index BSA (cm/m2): 2.0  Asc Ao diam index Ht(cm/m): 2.0  LA Volume (BP): 24.0 ml  LA Volume Index (BP): 15.9 ml/m2     RV Base: 2.5 cm  RWT: 0.51  TAPSE: 2.3 cm     Doppler Measurements & Calculations  MV E max satnam: 68.8 cm/sec  MV A max satnam: 116.2 cm/sec  MV E/A: 0.59  MV dec slope: 348.8 cm/sec2  MV dec time: 0.20 sec  Ao V2 max: 172.5 cm/sec  Ao max P.0 mmHg  Ao V2 mean: 136.4 cm/sec  Ao mean P.0 mmHg  Ao V2 VTI: 39.9 cm  RIO(I,D): 1.8 cm2  RIO(V,D): 1.7 cm2  AI P1/2t: 642.3 msec  LV V1 max PG: 3.3 mmHg  LV V1 max: 90.3 cm/sec  LV V1 VTI: 21.9 cm  SV(LVOT): 69.8 ml  SI(LVOT): 46.3 ml/m2  PA acc time: 0.13 sec  AV Satnam Ratio (DI): 0.52  RIO Index (cm2/m2): 1.2  E/E' av.3  Lateral E/e': 8.5     Medial E/e': 10.0  RV S Satnam: 16.0 cm/sec      ______________________________________________________________________________  Report approved by: Marce Cochran MD on 02/07/2025 04:30 PM

## 2025-02-19 NOTE — PLAN OF CARE
Physical Therapy Discharge Summary     Reason for therapy discharge:    Discharged to long term care.     Progress towards therapy goal(s). See goals on Care Plan in [facility] electronic health record for goal details.  Goals not met.  Barriers to achieving goals:   discharge from facility.     Therapy recommendation(s):    Continued therapy is recommended.  Rationale/Recommendations:  To maximize IND in mobility.    **Pt not seen by discharging therapist on this date, note written based on previous treating therapist's notes and recommendations